# Patient Record
Sex: FEMALE | Race: OTHER | Employment: OTHER
[De-identification: names, ages, dates, MRNs, and addresses within clinical notes are randomized per-mention and may not be internally consistent; named-entity substitution may affect disease eponyms.]

---

## 2017-01-05 ENCOUNTER — TELEPHONE (OUTPATIENT)
Dept: INTERNAL MEDICINE | Facility: CLINIC | Age: 57
End: 2017-01-05

## 2017-01-06 ENCOUNTER — OFFICE VISIT (OUTPATIENT)
Dept: INTERNAL MEDICINE | Facility: CLINIC | Age: 57
End: 2017-01-06

## 2017-01-06 VITALS — HEART RATE: 76 BPM | SYSTOLIC BLOOD PRESSURE: 160 MMHG | RESPIRATION RATE: 18 BRPM | DIASTOLIC BLOOD PRESSURE: 101 MMHG

## 2017-01-06 DIAGNOSIS — I10 ESSENTIAL HYPERTENSION: Primary | ICD-10-CM

## 2017-01-06 DIAGNOSIS — G89.4 CHRONIC PAIN SYNDROME: ICD-10-CM

## 2017-01-06 DIAGNOSIS — E78.00 PURE HYPERCHOLESTEROLEMIA: ICD-10-CM

## 2017-01-06 DIAGNOSIS — M47.817 DJD (DEGENERATIVE JOINT DISEASE), LUMBOSACRAL: ICD-10-CM

## 2017-01-06 PROCEDURE — 99213 OFFICE O/P EST LOW 20 MIN: CPT | Performed by: INTERNAL MEDICINE

## 2017-01-06 RX ORDER — HYDROCHLOROTHIAZIDE 25 MG/1
25 TABLET ORAL DAILY
Qty: 30 TABLET | Refills: 6 | Status: SHIPPED | OUTPATIENT
Start: 2017-01-06 | End: 2017-01-27

## 2017-01-06 RX ORDER — ACETAMINOPHEN AND CODEINE PHOSPHATE 300; 30 MG/1; MG/1
TABLET ORAL
COMMUNITY
Start: 2016-12-23 | End: 2017-02-09

## 2017-01-06 ASSESSMENT — ENCOUNTER SYMPTOMS
BACK PAIN: 1
RESPIRATORY NEGATIVE: 1
GASTROINTESTINAL NEGATIVE: 1
EYES NEGATIVE: 1
ALLERGIC/IMMUNOLOGIC NEGATIVE: 1

## 2017-01-12 ENCOUNTER — TELEPHONE (OUTPATIENT)
Dept: INTERNAL MEDICINE | Facility: CLINIC | Age: 57
End: 2017-01-12

## 2017-01-12 PROBLEM — M16.0 PRIMARY OSTEOARTHRITIS OF BOTH HIPS: Chronic | Status: ACTIVE | Noted: 2017-01-12

## 2017-01-25 ENCOUNTER — CARE COORDINATION (OUTPATIENT)
Dept: CARE COORDINATION | Facility: CLINIC | Age: 57
End: 2017-01-25

## 2017-01-27 ENCOUNTER — TELEPHONE (OUTPATIENT)
Dept: INTERNAL MEDICINE | Facility: CLINIC | Age: 57
End: 2017-01-27

## 2017-01-27 RX ORDER — AMLODIPINE BESYLATE 5 MG/1
5 TABLET ORAL DAILY
Qty: 30 TABLET | Refills: 11 | Status: SHIPPED | OUTPATIENT
Start: 2017-01-27 | End: 2017-05-26 | Stop reason: SDUPTHER

## 2017-02-09 ENCOUNTER — HOSPITAL ENCOUNTER (OUTPATIENT)
Dept: PAIN MANAGEMENT | Age: 57
Discharge: HOME | End: 2017-02-09
Attending: PSYCHOLOGIST | Admitting: PSYCHOLOGIST
Payer: COMMERCIAL

## 2017-02-09 ENCOUNTER — HOSPITAL ENCOUNTER (OUTPATIENT)
Dept: PAIN MANAGEMENT | Age: 57
Discharge: HOME | End: 2017-02-09
Attending: ANESTHESIOLOGY | Admitting: ANESTHESIOLOGY
Payer: COMMERCIAL

## 2017-02-09 VITALS
HEART RATE: 80 BPM | SYSTOLIC BLOOD PRESSURE: 148 MMHG | TEMPERATURE: 98.3 F | DIASTOLIC BLOOD PRESSURE: 85 MMHG | RESPIRATION RATE: 16 BRPM

## 2017-02-09 DIAGNOSIS — M96.1 POSTLAMINECTOMY SYNDROME, LUMBAR REGION: Primary | Chronic | ICD-10-CM

## 2017-02-09 DIAGNOSIS — M16.0 PRIMARY OSTEOARTHRITIS OF BOTH HIPS: Chronic | ICD-10-CM

## 2017-02-09 PROCEDURE — 99214 OFFICE O/P EST MOD 30 MIN: CPT

## 2017-02-09 RX ORDER — OXYCODONE HYDROCHLORIDE 5 MG/1
5 TABLET ORAL EVERY 8 HOURS PRN
Qty: 90 TABLET | Refills: 0 | Status: SHIPPED | OUTPATIENT
Start: 2017-02-11 | End: 2017-03-08 | Stop reason: SDUPTHER

## 2017-02-09 ASSESSMENT — PAIN DESCRIPTION - PAIN TYPE: TYPE: CHRONIC PAIN

## 2017-02-09 ASSESSMENT — PAIN DESCRIPTION - ORIENTATION: ORIENTATION: LOWER

## 2017-02-09 ASSESSMENT — PAIN DESCRIPTION - FREQUENCY: FREQUENCY: CONTINUOUS

## 2017-02-09 ASSESSMENT — PAIN DESCRIPTION - ONSET: ONSET: ON-GOING

## 2017-02-09 ASSESSMENT — PAIN DESCRIPTION - LOCATION: LOCATION: BACK

## 2017-02-09 ASSESSMENT — PAIN SCALES - GENERAL: PAINLEVEL_OUTOF10: 6

## 2017-02-09 ASSESSMENT — PAIN DESCRIPTION - PROGRESSION: CLINICAL_PROGRESSION: NOT CHANGED

## 2017-02-15 ENCOUNTER — CARE COORDINATION (OUTPATIENT)
Dept: CARE COORDINATION | Facility: CLINIC | Age: 57
End: 2017-02-15

## 2017-02-22 ENCOUNTER — HOSPITAL ENCOUNTER (OUTPATIENT)
Age: 57
Setting detail: SPECIMEN
Discharge: HOME | End: 2017-02-22
Payer: COMMERCIAL

## 2017-02-22 ENCOUNTER — TELEPHONE (OUTPATIENT)
Dept: INTERNAL MEDICINE | Facility: CLINIC | Age: 57
End: 2017-02-22

## 2017-02-22 ENCOUNTER — OFFICE VISIT (OUTPATIENT)
Dept: INTERNAL MEDICINE | Facility: CLINIC | Age: 57
End: 2017-02-22

## 2017-02-22 VITALS
SYSTOLIC BLOOD PRESSURE: 130 MMHG | DIASTOLIC BLOOD PRESSURE: 94 MMHG | HEIGHT: 66 IN | HEART RATE: 88 BPM | WEIGHT: 188.8 LBS | BODY MASS INDEX: 30.34 KG/M2

## 2017-02-22 DIAGNOSIS — E78.00 PURE HYPERCHOLESTEROLEMIA: ICD-10-CM

## 2017-02-22 DIAGNOSIS — I82.491 ACUTE DEEP VEIN THROMBOSIS (DVT) OF OTHER SPECIFIED VEIN OF RIGHT LOWER EXTREMITY (HCC): ICD-10-CM

## 2017-02-22 DIAGNOSIS — M10.071 ACUTE IDIOPATHIC GOUT OF RIGHT ANKLE: ICD-10-CM

## 2017-02-22 DIAGNOSIS — I10 ESSENTIAL HYPERTENSION: ICD-10-CM

## 2017-02-22 DIAGNOSIS — M47.817 DJD (DEGENERATIVE JOINT DISEASE), LUMBOSACRAL: Primary | ICD-10-CM

## 2017-02-22 LAB
SEDIMENTATION RATE, ERYTHROCYTE: 30 MM (ref 0–20)
URIC ACID: 6.1 MG/DL (ref 2.4–5.7)

## 2017-02-22 PROCEDURE — 99213 OFFICE O/P EST LOW 20 MIN: CPT | Performed by: INTERNAL MEDICINE

## 2017-02-22 PROCEDURE — 36415 COLL VENOUS BLD VENIPUNCTURE: CPT

## 2017-02-22 PROCEDURE — 85651 RBC SED RATE NONAUTOMATED: CPT

## 2017-02-22 PROCEDURE — 84550 ASSAY OF BLOOD/URIC ACID: CPT

## 2017-02-22 ASSESSMENT — ENCOUNTER SYMPTOMS
RESPIRATORY NEGATIVE: 1
ALLERGIC/IMMUNOLOGIC NEGATIVE: 1
GASTROINTESTINAL NEGATIVE: 1
EYES NEGATIVE: 1

## 2017-02-23 ENCOUNTER — HOSPITAL ENCOUNTER (OUTPATIENT)
Dept: VASCULAR LAB | Age: 57
Discharge: HOME | End: 2017-02-23
Payer: COMMERCIAL

## 2017-02-23 ENCOUNTER — TELEPHONE (OUTPATIENT)
Dept: INTERNAL MEDICINE CLINIC | Age: 57
End: 2017-02-23

## 2017-02-23 DIAGNOSIS — I82.491 ACUTE DEEP VEIN THROMBOSIS (DVT) OF OTHER SPECIFIED VEIN OF RIGHT LOWER EXTREMITY (HCC): ICD-10-CM

## 2017-02-23 PROCEDURE — 93970 EXTREMITY STUDY: CPT

## 2017-02-24 ENCOUNTER — OFFICE VISIT (OUTPATIENT)
Dept: INTERNAL MEDICINE | Facility: CLINIC | Age: 57
End: 2017-02-24

## 2017-02-24 VITALS — HEART RATE: 43 BPM | DIASTOLIC BLOOD PRESSURE: 69 MMHG | HEIGHT: 66 IN | SYSTOLIC BLOOD PRESSURE: 110 MMHG

## 2017-02-24 DIAGNOSIS — M10.171 LEAD-INDUCED ACUTE GOUT OF RIGHT ANKLE, SUBSEQUENT ENCOUNTER: Primary | ICD-10-CM

## 2017-02-24 DIAGNOSIS — T56.0X1D LEAD-INDUCED ACUTE GOUT OF RIGHT ANKLE, SUBSEQUENT ENCOUNTER: Primary | ICD-10-CM

## 2017-02-24 DIAGNOSIS — M17.0 PRIMARY OSTEOARTHRITIS OF BOTH KNEES: ICD-10-CM

## 2017-02-24 DIAGNOSIS — M19.011 PRIMARY OSTEOARTHRITIS OF BOTH SHOULDERS: ICD-10-CM

## 2017-02-24 DIAGNOSIS — I10 ESSENTIAL HYPERTENSION: ICD-10-CM

## 2017-02-24 DIAGNOSIS — E78.00 PURE HYPERCHOLESTEROLEMIA: ICD-10-CM

## 2017-02-24 DIAGNOSIS — M19.012 PRIMARY OSTEOARTHRITIS OF BOTH SHOULDERS: ICD-10-CM

## 2017-02-24 PROCEDURE — 99213 OFFICE O/P EST LOW 20 MIN: CPT | Performed by: INTERNAL MEDICINE

## 2017-02-24 RX ORDER — COLCHICINE 0.6 MG/1
0.6 TABLET ORAL DAILY
Qty: 10 TABLET | Refills: 0 | Status: SHIPPED | OUTPATIENT
Start: 2017-02-24 | End: 2017-04-05 | Stop reason: SDUPTHER

## 2017-02-24 ASSESSMENT — ENCOUNTER SYMPTOMS
EYES NEGATIVE: 1
RESPIRATORY NEGATIVE: 1
GASTROINTESTINAL NEGATIVE: 1

## 2017-03-08 ENCOUNTER — TELEPHONE (OUTPATIENT)
Dept: INTERNAL MEDICINE | Facility: CLINIC | Age: 57
End: 2017-03-08

## 2017-03-08 ENCOUNTER — HOSPITAL ENCOUNTER (OUTPATIENT)
Dept: PAIN MANAGEMENT | Age: 57
Discharge: HOME | End: 2017-03-08
Payer: COMMERCIAL

## 2017-03-08 VITALS
DIASTOLIC BLOOD PRESSURE: 83 MMHG | SYSTOLIC BLOOD PRESSURE: 141 MMHG | HEART RATE: 49 BPM | TEMPERATURE: 98 F | RESPIRATION RATE: 16 BRPM

## 2017-03-08 DIAGNOSIS — R00.1 BRADYCARDIA: Primary | ICD-10-CM

## 2017-03-08 DIAGNOSIS — M16.0 PRIMARY OSTEOARTHRITIS OF BOTH HIPS: Primary | Chronic | ICD-10-CM

## 2017-03-08 PROCEDURE — 99213 OFFICE O/P EST LOW 20 MIN: CPT

## 2017-03-08 PROCEDURE — 99214 OFFICE O/P EST MOD 30 MIN: CPT

## 2017-03-08 RX ORDER — OXYCODONE HYDROCHLORIDE 5 MG/1
5 TABLET ORAL EVERY 8 HOURS PRN
Qty: 90 TABLET | Refills: 0 | Status: SHIPPED | OUTPATIENT
Start: 2017-03-13 | End: 2017-04-10 | Stop reason: SDUPTHER

## 2017-03-08 RX ORDER — ACETAMINOPHEN 325 MG/1
650 TABLET ORAL PRN
COMMUNITY
End: 2018-04-03 | Stop reason: SDUPTHER

## 2017-03-08 ASSESSMENT — PAIN DESCRIPTION - PROGRESSION: CLINICAL_PROGRESSION: NOT CHANGED

## 2017-03-08 ASSESSMENT — PAIN DESCRIPTION - ORIENTATION: ORIENTATION: RIGHT;LEFT;LOWER

## 2017-03-08 ASSESSMENT — PAIN DESCRIPTION - FREQUENCY: FREQUENCY: CONTINUOUS

## 2017-03-08 ASSESSMENT — PAIN DESCRIPTION - ONSET: ONSET: ON-GOING

## 2017-03-08 ASSESSMENT — PAIN DESCRIPTION - LOCATION: LOCATION: BACK

## 2017-03-08 ASSESSMENT — PAIN DESCRIPTION - PAIN TYPE: TYPE: CHRONIC PAIN

## 2017-04-05 RX ORDER — COLCHICINE 0.6 MG/1
0.6 TABLET ORAL DAILY
Qty: 10 TABLET | Refills: 2 | Status: SHIPPED | OUTPATIENT
Start: 2017-04-05 | End: 2017-05-07 | Stop reason: SDUPTHER

## 2017-04-10 ENCOUNTER — HOSPITAL ENCOUNTER (OUTPATIENT)
Dept: PAIN MANAGEMENT | Age: 57
Discharge: HOME | End: 2017-04-10
Payer: COMMERCIAL

## 2017-04-10 VITALS — HEIGHT: 66 IN | RESPIRATION RATE: 18 BRPM | WEIGHT: 184 LBS | TEMPERATURE: 97.8 F | BODY MASS INDEX: 29.57 KG/M2

## 2017-04-10 DIAGNOSIS — G89.4 CHRONIC PAIN SYNDROME: Primary | ICD-10-CM

## 2017-04-10 DIAGNOSIS — M96.1 POSTLAMINECTOMY SYNDROME, LUMBAR REGION: Chronic | ICD-10-CM

## 2017-04-10 DIAGNOSIS — M16.0 PRIMARY OSTEOARTHRITIS OF BOTH HIPS: Chronic | ICD-10-CM

## 2017-04-10 PROCEDURE — 99214 OFFICE O/P EST MOD 30 MIN: CPT

## 2017-04-10 RX ORDER — OXYCODONE HYDROCHLORIDE 5 MG/1
5 TABLET ORAL EVERY 8 HOURS PRN
Qty: 90 TABLET | Refills: 0 | Status: SHIPPED | OUTPATIENT
Start: 2017-04-12 | End: 2017-05-09 | Stop reason: SDUPTHER

## 2017-04-10 ASSESSMENT — PAIN DESCRIPTION - ORIENTATION: ORIENTATION: RIGHT;LEFT;LOWER

## 2017-04-10 ASSESSMENT — ENCOUNTER SYMPTOMS
COUGH: 0
SHORTNESS OF BREATH: 0
CONSTIPATION: 0

## 2017-04-10 ASSESSMENT — PAIN DESCRIPTION - FREQUENCY: FREQUENCY: CONTINUOUS

## 2017-04-10 ASSESSMENT — PAIN DESCRIPTION - ONSET: ONSET: ON-GOING

## 2017-04-10 ASSESSMENT — PAIN SCALES - GENERAL: PAINLEVEL_OUTOF10: 7

## 2017-04-10 ASSESSMENT — PAIN DESCRIPTION - PAIN TYPE: TYPE: CHRONIC PAIN

## 2017-04-10 ASSESSMENT — PAIN DESCRIPTION - LOCATION: LOCATION: BACK

## 2017-04-24 RX ORDER — OMEPRAZOLE 20 MG/1
CAPSULE, DELAYED RELEASE ORAL
Qty: 30 CAPSULE | Refills: 0 | Status: SHIPPED | OUTPATIENT
Start: 2017-04-24 | End: 2017-07-27 | Stop reason: SDUPTHER

## 2017-05-08 RX ORDER — COLCHICINE 0.6 MG/1
TABLET ORAL
Qty: 10 TABLET | Refills: 2 | Status: SHIPPED | OUTPATIENT
Start: 2017-05-08 | End: 2017-08-14 | Stop reason: SDUPTHER

## 2017-05-09 ENCOUNTER — HOSPITAL ENCOUNTER (OUTPATIENT)
Dept: PAIN MANAGEMENT | Age: 57
Discharge: HOME | End: 2017-05-09
Payer: COMMERCIAL

## 2017-05-09 VITALS
SYSTOLIC BLOOD PRESSURE: 158 MMHG | TEMPERATURE: 98.3 F | RESPIRATION RATE: 12 BRPM | DIASTOLIC BLOOD PRESSURE: 78 MMHG | HEART RATE: 98 BPM

## 2017-05-09 PROCEDURE — 99214 OFFICE O/P EST MOD 30 MIN: CPT

## 2017-05-09 PROCEDURE — G0463 HOSPITAL OUTPT CLINIC VISIT: HCPCS

## 2017-05-09 PROCEDURE — 99213 OFFICE O/P EST LOW 20 MIN: CPT

## 2017-05-09 RX ORDER — OXYCODONE HYDROCHLORIDE 5 MG/1
5 TABLET ORAL EVERY 8 HOURS PRN
Qty: 90 TABLET | Refills: 0 | Status: SHIPPED | OUTPATIENT
Start: 2017-05-12 | End: 2017-06-09 | Stop reason: SDUPTHER

## 2017-05-09 ASSESSMENT — PAIN DESCRIPTION - LOCATION: LOCATION: HIP

## 2017-05-09 ASSESSMENT — PAIN DESCRIPTION - DESCRIPTORS: DESCRIPTORS: CONSTANT;ACHING

## 2017-05-09 ASSESSMENT — PAIN DESCRIPTION - PROGRESSION: CLINICAL_PROGRESSION: GRADUALLY WORSENING

## 2017-05-09 ASSESSMENT — PAIN SCALES - GENERAL: PAINLEVEL_OUTOF10: 6

## 2017-05-09 ASSESSMENT — PAIN DESCRIPTION - PAIN TYPE: TYPE: CHRONIC PAIN

## 2017-05-09 ASSESSMENT — PAIN DESCRIPTION - ORIENTATION: ORIENTATION: RIGHT

## 2017-05-26 ENCOUNTER — OFFICE VISIT (OUTPATIENT)
Dept: INTERNAL MEDICINE | Age: 57
End: 2017-05-26
Payer: COMMERCIAL

## 2017-05-26 VITALS
SYSTOLIC BLOOD PRESSURE: 143 MMHG | HEART RATE: 90 BPM | RESPIRATION RATE: 18 BRPM | WEIGHT: 186 LBS | BODY MASS INDEX: 30.02 KG/M2 | DIASTOLIC BLOOD PRESSURE: 93 MMHG

## 2017-05-26 DIAGNOSIS — R73.03 PREDIABETES: ICD-10-CM

## 2017-05-26 DIAGNOSIS — Z13.9 SCREENING: ICD-10-CM

## 2017-05-26 DIAGNOSIS — M47.817 DJD (DEGENERATIVE JOINT DISEASE), LUMBOSACRAL: Primary | ICD-10-CM

## 2017-05-26 DIAGNOSIS — E78.00 PURE HYPERCHOLESTEROLEMIA: ICD-10-CM

## 2017-05-26 DIAGNOSIS — I10 ESSENTIAL HYPERTENSION: ICD-10-CM

## 2017-05-26 LAB — HBA1C MFR BLD: 6.2 %

## 2017-05-26 PROCEDURE — 83036 HEMOGLOBIN GLYCOSYLATED A1C: CPT | Performed by: INTERNAL MEDICINE

## 2017-05-26 PROCEDURE — 99213 OFFICE O/P EST LOW 20 MIN: CPT | Performed by: INTERNAL MEDICINE

## 2017-05-26 RX ORDER — AMLODIPINE BESYLATE 10 MG/1
10 TABLET ORAL DAILY
Qty: 30 TABLET | Refills: 6 | Status: SHIPPED | OUTPATIENT
Start: 2017-05-26 | End: 2017-12-22 | Stop reason: SDUPTHER

## 2017-05-26 ASSESSMENT — ENCOUNTER SYMPTOMS
ALLERGIC/IMMUNOLOGIC NEGATIVE: 1
EYES NEGATIVE: 1
GASTROINTESTINAL NEGATIVE: 1
RESPIRATORY NEGATIVE: 1

## 2017-05-26 ASSESSMENT — PATIENT HEALTH QUESTIONNAIRE - PHQ9
2. FEELING DOWN, DEPRESSED OR HOPELESS: 0
1. LITTLE INTEREST OR PLEASURE IN DOING THINGS: 0
SUM OF ALL RESPONSES TO PHQ9 QUESTIONS 1 & 2: 0
SUM OF ALL RESPONSES TO PHQ QUESTIONS 1-9: 0

## 2017-06-09 ENCOUNTER — HOSPITAL ENCOUNTER (OUTPATIENT)
Dept: PAIN MANAGEMENT | Age: 57
Discharge: HOME | End: 2017-06-09
Payer: COMMERCIAL

## 2017-06-09 VITALS
WEIGHT: 186 LBS | BODY MASS INDEX: 29.89 KG/M2 | RESPIRATION RATE: 20 BRPM | SYSTOLIC BLOOD PRESSURE: 128 MMHG | DIASTOLIC BLOOD PRESSURE: 78 MMHG | HEIGHT: 66 IN | HEART RATE: 74 BPM | TEMPERATURE: 74 F

## 2017-06-09 DIAGNOSIS — M16.0 PRIMARY OSTEOARTHRITIS OF BOTH HIPS: Chronic | ICD-10-CM

## 2017-06-09 DIAGNOSIS — G89.4 CHRONIC PAIN SYNDROME: Primary | ICD-10-CM

## 2017-06-09 DIAGNOSIS — S73.014D: ICD-10-CM

## 2017-06-09 PROCEDURE — 80307 DRUG TEST PRSMV CHEM ANLYZR: CPT

## 2017-06-09 PROCEDURE — 99214 OFFICE O/P EST MOD 30 MIN: CPT

## 2017-06-09 PROCEDURE — G0463 HOSPITAL OUTPT CLINIC VISIT: HCPCS

## 2017-06-09 RX ORDER — OXYCODONE HYDROCHLORIDE 5 MG/1
5 TABLET ORAL EVERY 8 HOURS PRN
Qty: 90 TABLET | Refills: 0 | Status: SHIPPED | OUTPATIENT
Start: 2017-06-11 | End: 2017-07-10 | Stop reason: SDUPTHER

## 2017-06-09 ASSESSMENT — PAIN SCALES - GENERAL: PAINLEVEL_OUTOF10: 6

## 2017-06-09 ASSESSMENT — PAIN DESCRIPTION - FREQUENCY: FREQUENCY: CONTINUOUS

## 2017-06-09 ASSESSMENT — PAIN DESCRIPTION - DESCRIPTORS: DESCRIPTORS: CONSTANT;ACHING

## 2017-06-09 ASSESSMENT — ENCOUNTER SYMPTOMS
COUGH: 0
SHORTNESS OF BREATH: 0
CONSTIPATION: 0
BACK PAIN: 1

## 2017-06-09 ASSESSMENT — PAIN DESCRIPTION - LOCATION: LOCATION: HIP

## 2017-06-09 ASSESSMENT — PAIN DESCRIPTION - PAIN TYPE: TYPE: CHRONIC PAIN

## 2017-06-09 ASSESSMENT — PAIN DESCRIPTION - ORIENTATION: ORIENTATION: RIGHT

## 2017-06-09 ASSESSMENT — PAIN DESCRIPTION - ONSET: ONSET: ON-GOING

## 2017-06-11 LAB
6-ACETYLMORPHINE, UR: NOT DETECTED
7-AMINOCLONAZEPAM, URINE: NOT DETECTED
ALPHA-OH-ALPRAZ, URINE: NOT DETECTED
ALPRAZOLAM, URINE: NOT DETECTED
AMPHETAMINES, URINE: NOT DETECTED
BARBITURATES, URINE: NOT DETECTED
BENZOYLECGONINE, UR: NOT DETECTED
BUPRENORPHINE URINE: NOT DETECTED
CARISOPRODOL, UR: NOT DETECTED
CLONAZEPAM, URINE: NOT DETECTED
CODEINE, URINE: NOT DETECTED
CREATININE URINE: 145.9 MG/DL (ref 20–400)
DIAZEPAM, URINE: NOT DETECTED
EER PAIN MGT DRUG PANEL, HIGH RES/EMIT U: NORMAL
ETHYL GLUCURONIDE UR: NOT DETECTED
FENTANYL URINE: NOT DETECTED
HYDROCODONE, URINE: NOT DETECTED
HYDROMORPHONE, URINE: NOT DETECTED
LORAZEPAM, URINE: NOT DETECTED
MARIJUANA METAB, UR: NOT DETECTED
MDA, UR: NOT DETECTED
MDEA, EVE, UR: NOT DETECTED
MDMA URINE: NOT DETECTED
MEPERIDINE METAB, UR: NOT DETECTED
METHADONE, URINE: NOT DETECTED
METHAMPHETAMINE, URINE: NOT DETECTED
METHYLPHENIDATE: NOT DETECTED
MIDAZOLAM, URINE: NOT DETECTED
MORPHINE URINE: NOT DETECTED
NORBUPRENORPHINE, URINE: NOT DETECTED
NORDIAZEPAM, URINE: NOT DETECTED
NORFENTANYL, URINE: NOT DETECTED
NORHYDROCODONE, URINE: NOT DETECTED
NOROXYCODONE, URINE: PRESENT
NOROXYMORPHONE, URINE: PRESENT
OXAZEPAM, URINE: NOT DETECTED
OXYCODONE URINE: PRESENT
OXYMORPHONE, URINE: PRESENT
PAIN MGT DRUG PANEL, HI RES, UR: NORMAL
PCP,URINE: NOT DETECTED
PHENTERMINE, UR: NOT DETECTED
PROPOXYPHENE, URINE: NOT DETECTED
TAPENTADOL, URINE: NOT DETECTED
TAPENTADOL-O-SULFATE, URINE: NOT DETECTED
TEMAZEPAM, URINE: NOT DETECTED
TRAMADOL, URINE: NOT DETECTED
ZOLPIDEM, URINE: NOT DETECTED

## 2017-07-10 ENCOUNTER — HOSPITAL ENCOUNTER (OUTPATIENT)
Dept: PAIN MANAGEMENT | Age: 57
Discharge: HOME | End: 2017-07-10
Payer: COMMERCIAL

## 2017-07-10 VITALS
SYSTOLIC BLOOD PRESSURE: 121 MMHG | HEART RATE: 90 BPM | TEMPERATURE: 98.4 F | RESPIRATION RATE: 20 BRPM | DIASTOLIC BLOOD PRESSURE: 90 MMHG

## 2017-07-10 DIAGNOSIS — M16.0 PRIMARY OSTEOARTHRITIS OF BOTH HIPS: Primary | Chronic | ICD-10-CM

## 2017-07-10 PROCEDURE — 99214 OFFICE O/P EST MOD 30 MIN: CPT

## 2017-07-10 PROCEDURE — G0463 HOSPITAL OUTPT CLINIC VISIT: HCPCS

## 2017-07-10 RX ORDER — OXYCODONE HYDROCHLORIDE 5 MG/1
5 TABLET ORAL EVERY 8 HOURS PRN
Qty: 90 TABLET | Refills: 0 | Status: SHIPPED | OUTPATIENT
Start: 2017-07-11 | End: 2017-08-09 | Stop reason: SDUPTHER

## 2017-07-10 ASSESSMENT — PAIN DESCRIPTION - PAIN TYPE: TYPE: CHRONIC PAIN

## 2017-07-10 ASSESSMENT — PAIN DESCRIPTION - ORIENTATION: ORIENTATION: RIGHT;LEFT

## 2017-07-10 ASSESSMENT — ENCOUNTER SYMPTOMS
SHORTNESS OF BREATH: 0
CONSTIPATION: 0
COUGH: 0

## 2017-07-10 ASSESSMENT — PAIN DESCRIPTION - PROGRESSION: CLINICAL_PROGRESSION: GRADUALLY WORSENING

## 2017-07-10 ASSESSMENT — PAIN DESCRIPTION - LOCATION: LOCATION: HIP;GROIN

## 2017-07-10 ASSESSMENT — PAIN DESCRIPTION - ONSET: ONSET: ON-GOING

## 2017-07-10 ASSESSMENT — PAIN SCALES - GENERAL: PAINLEVEL_OUTOF10: 5

## 2017-07-10 ASSESSMENT — PAIN DESCRIPTION - DESCRIPTORS: DESCRIPTORS: CONSTANT;ACHING

## 2017-07-10 ASSESSMENT — PAIN DESCRIPTION - FREQUENCY: FREQUENCY: CONTINUOUS

## 2017-07-27 RX ORDER — OMEPRAZOLE 20 MG/1
CAPSULE, DELAYED RELEASE ORAL
Qty: 30 CAPSULE | Refills: 0 | Status: SHIPPED | OUTPATIENT
Start: 2017-07-27 | End: 2017-09-20 | Stop reason: SDUPTHER

## 2017-08-09 ENCOUNTER — HOSPITAL ENCOUNTER (OUTPATIENT)
Dept: PAIN MANAGEMENT | Age: 57
Discharge: HOME | End: 2017-08-09
Payer: COMMERCIAL

## 2017-08-09 VITALS
RESPIRATION RATE: 12 BRPM | DIASTOLIC BLOOD PRESSURE: 84 MMHG | HEART RATE: 81 BPM | TEMPERATURE: 98 F | SYSTOLIC BLOOD PRESSURE: 125 MMHG

## 2017-08-09 DIAGNOSIS — M16.0 PRIMARY OSTEOARTHRITIS OF BOTH HIPS: Primary | Chronic | ICD-10-CM

## 2017-08-09 DIAGNOSIS — M96.1 POSTLAMINECTOMY SYNDROME, LUMBAR REGION: Chronic | ICD-10-CM

## 2017-08-09 PROCEDURE — G0463 HOSPITAL OUTPT CLINIC VISIT: HCPCS

## 2017-08-09 PROCEDURE — 99214 OFFICE O/P EST MOD 30 MIN: CPT

## 2017-08-09 PROCEDURE — 99213 OFFICE O/P EST LOW 20 MIN: CPT

## 2017-08-09 RX ORDER — OXYCODONE HYDROCHLORIDE 5 MG/1
5 TABLET ORAL EVERY 8 HOURS PRN
Qty: 90 TABLET | Refills: 0 | Status: SHIPPED | OUTPATIENT
Start: 2017-08-10 | End: 2017-09-05 | Stop reason: SDUPTHER

## 2017-08-09 ASSESSMENT — ENCOUNTER SYMPTOMS
CONSTIPATION: 0
COUGH: 0
SHORTNESS OF BREATH: 0
BOWEL INCONTINENCE: 0
BACK PAIN: 1

## 2017-08-09 ASSESSMENT — PAIN DESCRIPTION - LOCATION: LOCATION: HIP

## 2017-08-09 ASSESSMENT — PAIN SCALES - GENERAL: PAINLEVEL_OUTOF10: 6

## 2017-08-09 ASSESSMENT — PAIN DESCRIPTION - FREQUENCY: FREQUENCY: CONTINUOUS

## 2017-08-09 ASSESSMENT — PAIN DESCRIPTION - PAIN TYPE: TYPE: CHRONIC PAIN

## 2017-08-09 ASSESSMENT — PAIN DESCRIPTION - PROGRESSION: CLINICAL_PROGRESSION: GRADUALLY WORSENING

## 2017-08-09 ASSESSMENT — PAIN DESCRIPTION - ORIENTATION: ORIENTATION: RIGHT;LEFT

## 2017-08-09 ASSESSMENT — PAIN DESCRIPTION - DESCRIPTORS: DESCRIPTORS: CONSTANT;STABBING;ACHING

## 2017-08-15 RX ORDER — COLCHICINE 0.6 MG/1
TABLET ORAL
Qty: 10 TABLET | Refills: 2 | Status: SHIPPED | OUTPATIENT
Start: 2017-08-15 | End: 2017-09-14 | Stop reason: SDUPTHER

## 2017-08-18 ENCOUNTER — HOSPITAL ENCOUNTER (OUTPATIENT)
Age: 57
Setting detail: SPECIMEN
Discharge: HOME | End: 2017-08-18
Payer: COMMERCIAL

## 2017-08-18 LAB
CREATININE URINE: 149.4 MG/DL (ref 28–217)
MICROALBUMIN/CREAT 24H UR: 31 MG/L
MICROALBUMIN/CREAT UR-RTO: 21 MCG/MG CREAT

## 2017-08-18 PROCEDURE — 82043 UR ALBUMIN QUANTITATIVE: CPT

## 2017-08-18 PROCEDURE — 82570 ASSAY OF URINE CREATININE: CPT

## 2017-08-29 DIAGNOSIS — M16.0 PRIMARY OSTEOARTHRITIS OF BOTH HIPS: Primary | Chronic | ICD-10-CM

## 2017-08-30 ENCOUNTER — OFFICE VISIT (OUTPATIENT)
Dept: ORTHOPEDIC SURGERY | Age: 57
End: 2017-08-30
Payer: COMMERCIAL

## 2017-08-30 VITALS — HEIGHT: 66 IN | BODY MASS INDEX: 29.9 KG/M2 | WEIGHT: 186.07 LBS

## 2017-08-30 DIAGNOSIS — M25.351 HIP INSTABILITY, RIGHT: ICD-10-CM

## 2017-08-30 DIAGNOSIS — Z96.641 HISTORY OF TOTAL RIGHT HIP ARTHROPLASTY: ICD-10-CM

## 2017-08-30 DIAGNOSIS — M25.551 RIGHT HIP PAIN: Primary | ICD-10-CM

## 2017-08-30 PROBLEM — M25.359 HIP INSTABILITY: Status: ACTIVE | Noted: 2017-08-30

## 2017-08-30 PROCEDURE — 3017F COLORECTAL CA SCREEN DOC REV: CPT | Performed by: STUDENT IN AN ORGANIZED HEALTH CARE EDUCATION/TRAINING PROGRAM

## 2017-08-30 PROCEDURE — G8417 CALC BMI ABV UP PARAM F/U: HCPCS | Performed by: STUDENT IN AN ORGANIZED HEALTH CARE EDUCATION/TRAINING PROGRAM

## 2017-08-30 PROCEDURE — 1036F TOBACCO NON-USER: CPT | Performed by: STUDENT IN AN ORGANIZED HEALTH CARE EDUCATION/TRAINING PROGRAM

## 2017-08-30 PROCEDURE — 99203 OFFICE O/P NEW LOW 30 MIN: CPT | Performed by: STUDENT IN AN ORGANIZED HEALTH CARE EDUCATION/TRAINING PROGRAM

## 2017-08-30 PROCEDURE — G8427 DOCREV CUR MEDS BY ELIG CLIN: HCPCS | Performed by: STUDENT IN AN ORGANIZED HEALTH CARE EDUCATION/TRAINING PROGRAM

## 2017-08-30 PROCEDURE — 3014F SCREEN MAMMO DOC REV: CPT | Performed by: STUDENT IN AN ORGANIZED HEALTH CARE EDUCATION/TRAINING PROGRAM

## 2017-08-30 ASSESSMENT — ENCOUNTER SYMPTOMS
ABDOMINAL PAIN: 0
COUGH: 0
NAUSEA: 0
COLOR CHANGE: 0
EYE PAIN: 0
VOMITING: 0
WHEEZING: 0
ABDOMINAL DISTENTION: 0
SHORTNESS OF BREATH: 0

## 2017-08-30 NOTE — PROGRESS NOTES
02/18/14    caudal# 3, celestone 9 mg    OTHER SURGICAL HISTORY      SPINAL ENDOSCOPY X3    SHOULDER ARTHROSCOPY Left 06/13/15    SHOULDER SURGERY  Right; 2011    SPINE SURGERY  2009    TOTAL HIP ARTHROPLASTY Right 10/12/2016       Current Medications:   Current Outpatient Prescriptions   Medication Sig Dispense Refill    colchicine (COLCRYS) 0.6 MG tablet take 1 tablet by mouth once daily 10 tablet 2    oxyCODONE (ROXICODONE) 5 MG immediate release tablet Take 1 tablet by mouth every 8 hours as needed for Pain . Earliest Fill Date: 8/10/17 90 tablet 0    omeprazole (PRILOSEC) 20 MG delayed release capsule take 1 capsule by mouth once daily 30-60 MINUTES BEFORE FIRST MEAL OF DAY 30 capsule 0    amLODIPine (NORVASC) 10 MG tablet Take 1 tablet by mouth daily 30 tablet 6    acetaminophen (TYLENOL) 325 MG tablet Take 650 mg by mouth as needed for Pain       No current facility-administered medications for this visit. Allergies:    Asa [aspirin]; Ibuprofen; Neurontin [gabapentin]; Shellfish-derived products;  Shrimp flavor; and Tape Beverely Holman tape]    Social History:   Social History     Social History    Marital status: Single     Spouse name: N/A    Number of children: N/A    Years of education: N/A     Occupational History    disability N/A     Social History Main Topics    Smoking status: Former Smoker     Packs/day: 0.25     Years: 25.00     Types: Cigarettes, E-Cigarettes    Smokeless tobacco: Never Used      Comment: e-cigarette currently with occasional hit off a cigarette    Alcohol use 0.0 oz/week     0 Standard drinks or equivalent per week      Comment: occasionally    Drug use: No      Comment: crack YRS AGO    Sexual activity: Yes     Other Topics Concern    Not on file     Social History Narrative       Family History:  Family History   Problem Relation Age of Onset    COPD Mother     Coronary Art Dis Father     Cancer Sister     Coronary Art Dis Sister     Cirrhosis Sister  Arthritis Brother     Asthma Brother     Arthritis Sister     Asthma Sister     Cancer Sister     Asthma Sister     Cancer Sister     Asthma Sister     Asthma Brother     Arthritis Brother          REVIEW OF SYSTEMS:  Review of Systems   Constitutional: Negative for chills and fever. HENT: Negative for congestion. Eyes: Negative for pain and visual disturbance. Respiratory: Negative for cough, shortness of breath and wheezing. Cardiovascular: Negative for chest pain, palpitations and leg swelling. Gastrointestinal: Negative for abdominal distention, abdominal pain, nausea and vomiting. Musculoskeletal: Positive for arthralgias, gait problem and myalgias. Negative for joint swelling. Skin: Negative for color change, rash and wound. Neurological: Positive for weakness. Psychiatric/Behavioral: Negative for suicidal ideas. The patient is not nervous/anxious. I have reviewed the CC, HPI, ROS, PMH, FHX, Social History. I agree with the documentation provided by other staff, residents, and/or medical students and have reviewed their documentation prior to providing my signature indicating agreement. PHYSICAL EXAM:  Ht 5' 6.14\" (1.68 m)  Wt 186 lb 1.1 oz (84.4 kg)  BMI 29.9 kg/m2  Physical Exam  Gen: alert and oriented  Psych:  Appropriate affect; Appropriate knowledge base; Appropriate mood; No hallucinations; Head: normocephalic atraumatic   Chest: symmetric chest excursion  Pelvis: stable  Ortho Exam  RLE: Abduction brace in place. Incision well healed without erythema or drainage or dehiscence. Very TTP over incision. No instability on exam. Patient does well with flex/ext but is very hesitant when I internally and externally rotate the leg. Denies any pain during ROM but states she \"feels it in there. \" EHL/FHL/GS/TA gross motor intact. Saph/sural/SP/DP/plantar SILT. Compartments soft and compressible. Foot is warm and perfused. DP/PT 2+ w/ BCR.       Radiology:   History: Required     Requested Specialty:   Physical Therapy     Number of Visits Requested:   1        Juanpablo Jones, DO  PGY-2, Department of [address], [place], [place]  5:27 PM 8/30/2017

## 2017-08-30 NOTE — MR AVS SNAPSHOT
After Visit Summary             Anthony Willson   2017 2:50 PM   Office Visit    Description:  Female : 1960   Provider:  Natalie López DO   Department:  [address] [place] [address] Drive and Future Appointments         Below is a list of your follow-up and future appointments. This may not be a complete list as you may have made appointments directly with providers that we are not aware of or your providers may have made some for you. Please call your providers to confirm appointments. It is important to keep your appointments. Please bring your current insurance card, photo ID, co-pay, and all medication bottles to your appointment. If self-pay, payment is expected at the time of service. Your To-Do List     Future Appointments Provider Department Dept Phone    2017 3:30 PM BUBBA Alarcon/ [address] [phone]    Please bring your photo ID, insurance card, co-pay and medication bottles with you to your appointment Appointments must be kept or cancelled within 24 hours    10/9/2017 11:20 AM SCHEDULE, [address] [phone]    Please arrive 15 minutes prior to appointment time, bring insurance card and photo ID.      Future Orders Complete By Expires    C-Reactive Protein [PGR908 Custom]  2017    Comprehensive Metabolic Panel [EST42 Custom]  2017    Sedimentation Rate [FMS1626 Custom]  2017         Information from Your Visit        Department     Name Address Phone Fax    [facility]/ [address] [place] [address] [phone]      You Were Seen for:         Comments    Right hip pain   [[id number]]         Vital Signs     Height Weight Body Mass Index Smoking Status          5' 6.14\" (1.68 m) 186 lb 1.1 oz (84.4 kg) 29.9 kg/m2 Former Smoker        Additional Information about your Body Mass Index (BMI) Date Of Birth Sex Race Ethnicity Preferred Language    1960 Female Black Non-/Non  English      Problem List as of 8/30/2017  Date Reviewed: 8/9/2017                Primary osteoarthritis of both hips (Chronic)    Acute cystitis without hematuria    Frequent PVCs    Posterior dislocation of hip ([address]    History of total right hip replacement    S/P lumbar spinal fusion    Encounter for medication monitoring    Chronic pain syndrome    Shoulder bursitis    Postlaminectomy syndrome, lumbar region (Chronic)    Osteoarthritis of both knees    Osteoarthritis of both shoulders    DJD (degenerative joint disease), lumbosacral    Essential hypertension    Hyperlipidemia    Depression    Obesity      Preventive Care        Date Due    Diabetic Foot Exam 6/27/1970    Eye Exam By An Eye Doctor 6/27/1970    Pneumococcal Vaccine - Pneumovax for adults aged 19-64 years with: chronic heart disease, chronic lung disease, diabetes mellitus, alcoholism, chronic liver disease, or cigarette smoking. (1 of 1 - PPSV23) 6/27/1979    Pap Smear 6/27/1981    Cholesterol Screening 7/12/2017    Yearly Flu Vaccine (1) 8/1/2017    Tetanus Combination Vaccine (1 - Tdap) 11/4/2017 (Originally 6/27/1979)    Hepatitis C screening is recommended for all adults regardless of risk factors born between [facility] at least once (lifetime) who have never been tested. 11/4/2017 (Originally 1960)    HIV screening is recommended for all people regardless of risk factors  aged 15-65 years at least once (lifetime) who have never been HIV tested. 11/4/2017 (Originally 6/27/1975)    Mammograms are recommended every 2 years for low/average risk patients aged 48 - 69, and every year for high risk patients per updated national guidelines. However these guidelines can be individualized by your provider.  8/4/2018 (Originally 6/27/2010)    Hemoglobin A1C (Test For Long-Term Glucose Control) 5/26/2018

## 2017-09-05 ENCOUNTER — HOSPITAL ENCOUNTER (OUTPATIENT)
Dept: PAIN MANAGEMENT | Age: 57
Discharge: HOME | End: 2017-09-05
Payer: COMMERCIAL

## 2017-09-05 VITALS
SYSTOLIC BLOOD PRESSURE: 142 MMHG | BODY MASS INDEX: 29.89 KG/M2 | WEIGHT: 186 LBS | HEIGHT: 66 IN | HEART RATE: 78 BPM | RESPIRATION RATE: 16 BRPM | DIASTOLIC BLOOD PRESSURE: 90 MMHG | TEMPERATURE: 98.1 F

## 2017-09-05 DIAGNOSIS — M47.817 DJD (DEGENERATIVE JOINT DISEASE), LUMBOSACRAL: ICD-10-CM

## 2017-09-05 DIAGNOSIS — M96.1 POSTLAMINECTOMY SYNDROME, LUMBAR REGION: Primary | Chronic | ICD-10-CM

## 2017-09-05 PROCEDURE — 99214 OFFICE O/P EST MOD 30 MIN: CPT

## 2017-09-05 PROCEDURE — 99213 OFFICE O/P EST LOW 20 MIN: CPT | Performed by: ANESTHESIOLOGY

## 2017-09-05 PROCEDURE — G0463 HOSPITAL OUTPT CLINIC VISIT: HCPCS

## 2017-09-05 PROCEDURE — 80307 DRUG TEST PRSMV CHEM ANLYZR: CPT

## 2017-09-05 RX ORDER — OXYCODONE HYDROCHLORIDE 5 MG/1
5 TABLET ORAL 2 TIMES DAILY PRN
Qty: 60 TABLET | Refills: 0 | Status: SHIPPED | OUTPATIENT
Start: 2017-09-09 | End: 2017-10-09

## 2017-09-05 ASSESSMENT — PAIN DESCRIPTION - FREQUENCY: FREQUENCY: CONTINUOUS

## 2017-09-05 ASSESSMENT — PAIN DESCRIPTION - DESCRIPTORS: DESCRIPTORS: CONSTANT;SHARP;PINS AND NEEDLES

## 2017-09-05 ASSESSMENT — PAIN SCALES - GENERAL: PAINLEVEL_OUTOF10: 7

## 2017-09-05 ASSESSMENT — PAIN DESCRIPTION - PROGRESSION: CLINICAL_PROGRESSION: GRADUALLY WORSENING

## 2017-09-05 ASSESSMENT — PAIN DESCRIPTION - ONSET: ONSET: ON-GOING

## 2017-09-05 ASSESSMENT — PAIN DESCRIPTION - ORIENTATION: ORIENTATION: LOWER;RIGHT;LEFT

## 2017-09-07 LAB
6-ACETYLMORPHINE, UR: NOT DETECTED
7-AMINOCLONAZEPAM, URINE: NOT DETECTED
ALPHA-OH-ALPRAZ, URINE: NOT DETECTED
ALPRAZOLAM, URINE: NOT DETECTED
AMPHETAMINES, URINE: NOT DETECTED
BARBITURATES, URINE: NOT DETECTED
BENZOYLECGONINE, UR: NOT DETECTED
BUPRENORPHINE URINE: NOT DETECTED
CARISOPRODOL, UR: NOT DETECTED
CLONAZEPAM, URINE: NOT DETECTED
CODEINE, URINE: NOT DETECTED
CREATININE URINE: 173.4 MG/DL (ref 20–400)
DIAZEPAM, URINE: NOT DETECTED
EER PAIN MGT DRUG PANEL, HIGH RES/EMIT U: NORMAL
ETHYL GLUCURONIDE UR: PRESENT
FENTANYL URINE: NOT DETECTED
HYDROCODONE, URINE: NOT DETECTED
HYDROMORPHONE, URINE: NOT DETECTED
LORAZEPAM, URINE: NOT DETECTED
MARIJUANA METAB, UR: NOT DETECTED
MDA, UR: NOT DETECTED
MDEA, EVE, UR: NOT DETECTED
MDMA URINE: NOT DETECTED
MEPERIDINE METAB, UR: NOT DETECTED
METHADONE, URINE: NOT DETECTED
METHAMPHETAMINE, URINE: NOT DETECTED
METHYLPHENIDATE: NOT DETECTED
MIDAZOLAM, URINE: NOT DETECTED
MORPHINE URINE: NOT DETECTED
NORBUPRENORPHINE, URINE: NOT DETECTED
NORDIAZEPAM, URINE: NOT DETECTED
NORFENTANYL, URINE: NOT DETECTED
NORHYDROCODONE, URINE: NOT DETECTED
NOROXYCODONE, URINE: PRESENT
NOROXYMORPHONE, URINE: PRESENT
OXAZEPAM, URINE: NOT DETECTED
OXYCODONE URINE: PRESENT
OXYMORPHONE, URINE: PRESENT
PAIN MGT DRUG PANEL, HI RES, UR: NORMAL
PCP,URINE: NOT DETECTED
PHENTERMINE, UR: NOT DETECTED
PROPOXYPHENE, URINE: NOT DETECTED
TAPENTADOL, URINE: NOT DETECTED
TAPENTADOL-O-SULFATE, URINE: NOT DETECTED
TEMAZEPAM, URINE: NOT DETECTED
TRAMADOL, URINE: PRESENT
ZOLPIDEM, URINE: NOT DETECTED

## 2017-09-11 DIAGNOSIS — Z01.818 PRE-OP TESTING: Primary | ICD-10-CM

## 2017-09-14 RX ORDER — COLCHICINE 0.6 MG/1
TABLET ORAL
Qty: 10 TABLET | Refills: 2 | Status: SHIPPED | OUTPATIENT
Start: 2017-09-14 | End: 2017-09-20 | Stop reason: SDUPTHER

## 2017-09-19 ENCOUNTER — HOSPITAL ENCOUNTER (OUTPATIENT)
Dept: PHYSICAL THERAPY | Age: 57
Setting detail: THERAPIES SERIES
Discharge: HOME | End: 2017-09-19
Payer: COMMERCIAL

## 2017-09-20 ENCOUNTER — OFFICE VISIT (OUTPATIENT)
Dept: INTERNAL MEDICINE | Age: 57
End: 2017-09-20
Payer: COMMERCIAL

## 2017-09-20 VITALS
DIASTOLIC BLOOD PRESSURE: 78 MMHG | WEIGHT: 182.2 LBS | BODY MASS INDEX: 29.28 KG/M2 | HEIGHT: 66 IN | SYSTOLIC BLOOD PRESSURE: 114 MMHG | HEART RATE: 86 BPM

## 2017-09-20 DIAGNOSIS — M16.0 PRIMARY OSTEOARTHRITIS OF BOTH HIPS: Chronic | ICD-10-CM

## 2017-09-20 DIAGNOSIS — M96.1 POSTLAMINECTOMY SYNDROME, LUMBAR REGION: Chronic | ICD-10-CM

## 2017-09-20 DIAGNOSIS — E78.00 PURE HYPERCHOLESTEROLEMIA: ICD-10-CM

## 2017-09-20 DIAGNOSIS — I10 ESSENTIAL HYPERTENSION: Primary | ICD-10-CM

## 2017-09-20 PROCEDURE — 99213 OFFICE O/P EST LOW 20 MIN: CPT | Performed by: INTERNAL MEDICINE

## 2017-09-20 RX ORDER — OMEPRAZOLE 20 MG/1
CAPSULE, DELAYED RELEASE ORAL
Qty: 30 CAPSULE | Refills: 3 | Status: SHIPPED | OUTPATIENT
Start: 2017-09-20 | End: 2018-01-22 | Stop reason: SDUPTHER

## 2017-09-20 RX ORDER — COLCHICINE 0.6 MG/1
TABLET ORAL
Qty: 30 TABLET | Refills: 0 | Status: SHIPPED | OUTPATIENT
Start: 2017-09-20 | End: 2017-10-27 | Stop reason: SDUPTHER

## 2017-09-20 ASSESSMENT — ENCOUNTER SYMPTOMS
ALLERGIC/IMMUNOLOGIC NEGATIVE: 1
RESPIRATORY NEGATIVE: 1
BACK PAIN: 1
EYES NEGATIVE: 1
GASTROINTESTINAL NEGATIVE: 1

## 2017-09-21 ENCOUNTER — TELEPHONE (OUTPATIENT)
Dept: INTERNAL MEDICINE | Age: 57
End: 2017-09-21

## 2017-09-21 DIAGNOSIS — M79.671 PAIN IN BOTH FEET: Primary | ICD-10-CM

## 2017-09-21 DIAGNOSIS — M79.672 PAIN IN BOTH FEET: Primary | ICD-10-CM

## 2017-09-25 ENCOUNTER — TELEPHONE (OUTPATIENT)
Dept: INTERNAL MEDICINE | Age: 57
End: 2017-09-25

## 2017-09-28 NOTE — TELEPHONE ENCOUNTER
Patient called in and stated that she will call and get exact name for the brace and give us a call back.

## 2017-09-28 NOTE — TELEPHONE ENCOUNTER
Patient returned phone call and stated the name of the brace is Hip Abduction Brace. She also stated it can be faxed to [facility] at [phone] (S) when ready.

## 2017-10-06 ENCOUNTER — HOSPITAL ENCOUNTER (OUTPATIENT)
Age: 57
Discharge: HOME | End: 2017-10-06
Payer: COMMERCIAL

## 2017-10-06 DIAGNOSIS — Z01.818 PRE-OP TESTING: ICD-10-CM

## 2017-10-06 DIAGNOSIS — M25.551 RIGHT HIP PAIN: ICD-10-CM

## 2017-10-06 LAB
ALBUMIN SERPL-MCNC: 3.8 G/DL (ref 3.5–5.2)
ALBUMIN/GLOBULIN RATIO: 1.2 (ref 1–2.5)
ALP BLD-CCNC: 33 U/L (ref 35–104)
ALT SERPL-CCNC: 10 U/L (ref 5–33)
ANION GAP SERPL CALCULATED.3IONS-SCNC: 10 MMOL/L (ref 9–17)
AST SERPL-CCNC: 13 U/L
BILIRUB SERPL-MCNC: 0.3 MG/DL (ref 0.3–1.2)
BUN BLDV-MCNC: 13 MG/DL (ref 6–20)
BUN/CREAT BLD: ABNORMAL (ref 9–20)
C-REACTIVE PROTEIN: 3.5 MG/L (ref 0–5)
CALCIUM SERPL-MCNC: 9.1 MG/DL (ref 8.6–10.4)
CHLORIDE BLD-SCNC: 105 MMOL/L (ref 98–107)
CO2: 27 MMOL/L (ref 20–31)
CREAT SERPL-MCNC: 0.83 MG/DL (ref 0.5–0.9)
GFR AFRICAN AMERICAN: >60 ML/MIN
GFR NON-AFRICAN AMERICAN: >60 ML/MIN
GFR SERPL CREATININE-BSD FRML MDRD: ABNORMAL ML/MIN/{1.73_M2}
GFR SERPL CREATININE-BSD FRML MDRD: ABNORMAL ML/MIN/{1.73_M2}
GLUCOSE BLD-MCNC: 102 MG/DL (ref 70–99)
HCT VFR BLD CALC: 36.3 % (ref 36–46)
HEMOGLOBIN: 11.7 G/DL (ref 12–16)
MCH RBC QN AUTO: 26.6 PG (ref 26–34)
MCHC RBC AUTO-ENTMCNC: 32.2 G/DL (ref 31–37)
MCV RBC AUTO: 82.8 FL (ref 80–100)
PDW BLD-RTO: 16.3 % (ref 12.5–15.4)
PLATELET # BLD: 216 K/UL (ref 140–450)
PMV BLD AUTO: 9 FL (ref 6–12)
POTASSIUM SERPL-SCNC: 4.1 MMOL/L (ref 3.7–5.3)
RBC # BLD: 4.39 M/UL (ref 4–5.2)
SEDIMENTATION RATE, ERYTHROCYTE: 15 MM (ref 0–20)
SODIUM BLD-SCNC: 142 MMOL/L (ref 135–144)
TOTAL PROTEIN: 7 G/DL (ref 6.4–8.3)
WBC # BLD: 6 K/UL (ref 3.5–11)

## 2017-10-06 PROCEDURE — 36415 COLL VENOUS BLD VENIPUNCTURE: CPT

## 2017-10-06 PROCEDURE — 80053 COMPREHEN METABOLIC PANEL: CPT

## 2017-10-06 PROCEDURE — 85027 COMPLETE CBC AUTOMATED: CPT

## 2017-10-06 PROCEDURE — 86140 C-REACTIVE PROTEIN: CPT

## 2017-10-06 PROCEDURE — 85651 RBC SED RATE NONAUTOMATED: CPT

## 2017-10-09 ENCOUNTER — OFFICE VISIT (OUTPATIENT)
Dept: ORTHOPEDIC SURGERY | Age: 57
End: 2017-10-09
Payer: COMMERCIAL

## 2017-10-09 VITALS — WEIGHT: 185.8 LBS | HEIGHT: 66 IN | BODY MASS INDEX: 29.86 KG/M2

## 2017-10-09 DIAGNOSIS — M25.351 HIP INSTABILITY, RIGHT: Primary | ICD-10-CM

## 2017-10-09 PROCEDURE — 99213 OFFICE O/P EST LOW 20 MIN: CPT | Performed by: ORTHOPAEDIC SURGERY

## 2017-10-09 NOTE — PROGRESS NOTES
[address] [place] [address]  Dept: [phone]  Dept Fax: [phone]        Ambulatory Follow Up      Subjective:   Maribel Miles is a 62y.o. year old female who presents to our office today for routine followup regarding her   1. Hip instability, right    . Chief Complaint   Patient presents with    Hip Pain     Right       HPI  Presents to follow-up on her right hip. Patient continues to wear the abduction brace. She reports that she called physical therapy but has not started therapy as they were busy and fall at the time when she called. She wears the brace nearly 24 hours per day. She was in pain management but was discharged from pains management because she was found to be positive for marijuana. Patient states that she does smoke marijuana occasionally and she feels that she gets pain relief from it. She has not dislocated since the revision surgery in November 2016. Review of Systems    Review of Systems - General ROS: negative for - chills, fatigue, fever or night sweats  Hematological and Lymphatic ROS: negative for - bleeding problems   Respiratory ROS: no cough, shortness of breath, or wheezing   Cardiovascular ROS: no chest pain or dyspnea on exertion   Gastrointestinal ROS: no abdominal pain, change in bowel habits  Musculoskeletal ROS: positive for - pain in limb   Neurological ROS: negative for - numbness/tingling or weakness       Objective :   General: Maribel Miles is a 62 y.o. female who is alert and oriented and sitting comfortably in our office. Ortho Exam  MS:  RLE: Hip abduction brace in place. Negative log roll. Soft, compressible compartments. 2+ DP/PT pulses. TA/EHL/FHL/GS motor intact. Sensation to light touch intact to superficial peroneal/deep peroneal/sural/saphenous nerves. Radiology:   None today    Assessment:      1.  Hip instability, right       Plan:      Condition with Dr. Dillon Rhoades and patient regarding her condition. This point we recommend that she performs physical therapy to strengthen her hip musculature and to improve her gait mechanics. And tried to get her out of the hip abduction brace as it has been 1 year after the revision surgery without any dislocation at this point. Patient understands but states that she psychologically feels that she cannot go without her brace at this point but she will continue to try as her body in mind allow her to. Follow up as needed or after therapy. Follow up:Return if symptoms worsen or fail to improve. No orders of the defined types were placed in this encounter. No orders of the defined types were placed in this encounter.       Electronically signed by Claudine Rangel DO on 10/9/2017 at 12:14 PM

## 2017-10-10 ENCOUNTER — TELEPHONE (OUTPATIENT)
Dept: INTERNAL MEDICINE | Age: 57
End: 2017-10-10

## 2017-10-10 DIAGNOSIS — S73.014D: ICD-10-CM

## 2017-10-10 DIAGNOSIS — M17.0 PRIMARY OSTEOARTHRITIS OF BOTH KNEES: ICD-10-CM

## 2017-10-10 DIAGNOSIS — M96.1 POSTLAMINECTOMY SYNDROME, LUMBAR REGION: Primary | Chronic | ICD-10-CM

## 2017-10-10 NOTE — TELEPHONE ENCOUNTER
PC from patient-- she states that at her last appt she spoke with PCP about getting new referral for pain clinic--- was not sure where she wanted to go-- called around and [facility]----referral pended--- needs to be faxed to [phone]

## 2017-10-10 NOTE — PROGRESS NOTES
I performed a history and physical examination of the patient and discussed management with the resident. I reviewed the residents note and agree with the documented findings and plan of care. Any areas of disagreement are noted on the chart. I have personally evaluated this patient and have completed at least one if not all key elements of the E/M (history, physical exam, and MDM). Additional findings are as noted. I agree with the chief complaint, past medical history, past surgical history, allergies, medications, social and family history as documented unless otherwise noted below.      Electronically signed by Loi Murillo DO on 10/9/2017 at 10:21 PM

## 2017-10-12 ENCOUNTER — HOSPITAL ENCOUNTER (OUTPATIENT)
Dept: PHYSICAL THERAPY | Age: 57
Setting detail: THERAPIES SERIES
Discharge: HOME | End: 2017-10-12
Payer: COMMERCIAL

## 2017-10-12 PROCEDURE — G8979 MOBILITY GOAL STATUS: HCPCS

## 2017-10-12 PROCEDURE — 97162 PT EVAL MOD COMPLEX 30 MIN: CPT

## 2017-10-12 PROCEDURE — G8978 MOBILITY CURRENT STATUS: HCPCS

## 2017-10-12 NOTE — CONSULTS
[x] Ramón Moses        Outpatient Physical                Therapy       [address]       Phone: [phone]       Fax: [phone] [] [facility] at [address] [facility]       Phone: [phone]       Fax: [phone] [] [facility] [place] [address] Promotion    [address] [place] [address]     Phone: [phone]     Fax:  [phone]     Physical Therapy Lower Extremity Evaluation    Date:  10/12/2017  Patient: Alex Blankenship  : 1960  MRN: [id number]  Physician: Lisbeth Schreiber DO   Insurance: Tosha Mayen Dual Benefits  Medical Diagnosis: right hip pain (M25.551); Rehab Codes: muscle weakness (M62.81); abnormality of gait (R26.89); pain right hip (M25.551); stiffness right hip (M25.651); adherent scar (L90.5); difficulty walking (R26.2)  Onset date: 2016  Next 's appt. : to be determined    Subjective:   CC:pain all the time, soreness around the incision and in the left hip  HPI: (onset date) fal with dislocation of right toal hip prosthesis and right hip revision 2016 in hospital until 2016 then discharged home  PMHx: [] Unremarkable [] Diabetes [] HTN  [] Pacemaker   [] MI/Heart Problems [] Cancer [] Arthritis [] Other:              [] Refer to full medical chart  In EPIC   Tests: [x] X-Ray: [] MRI:  [x] Other: vascular studies (gout)    Medications: [x] Refer to full medical record [] None [] Other:  Allergies:      [x] Refer to full medical record [] None [] Other:    Function:  Hand Dominance  [x] Right  [] Left  Working:  [] Normal Duty  [] Light Duty  [] Off D/T Condition  [] Retired    [x] Not Employed    []  Disability  [] Other:           Return to work:   Job/ADL Description:      Pain:  [x] Yes  [] No Location: right hip and thigh (also intermittent low back pain and bilateral shoulder pain)   Pain Rating: (0-10 scale) varies/10  Pain altered Tx:  [] Yes  [] No  Action:  Symptoms:  [] Improving [] Worsening [x] Same  Better:  [] AM    [] PM    [] Sit    [] Rise/Sit    []Stand    [] Walk    [] Lying    [x] Other:rest; pain meds, elevation  Worse: [] AM    [] PM    [] Sit    [] Rise/Sit    []Stand    [] Walk    [] Lying    [] Bend                             [] Valsalva    [x] Other activities:  Sleep: [] OK    [x] Disturbed    Objective:    ROM  ° A/P STRENGTH TESTS (+/-) Left Right Not Tested    Left Right Left Right Ant.  Drawer   []   Hip Flex 95 ° 90 ° 3+/5 5/5 Post. Drawer   []   Ext 15 ° 10 ° 3+/5 5-5 Lachmans   []   ER     Valgus Stress   []   IR     Varus Stress   []   ABD 35 ° 15 ° 3+/5 4+/5 Teres   []   ADD   3+/5 5-/5 Apleys Comp.   []   Knee Flex 130 ° 110 ° 4/5 5/5 Apleys Dist.   []   Ext 0° 0 ° 4-/5 4+/5 Hip Scouring   []   Ankle DF     CELIs   []   PF     Piriformis   []   INV     Megans   []   EVER     Talor Tilt   []        Pat-Fem Grind   []       OBSERVATION No Deficit Deficit Not Tested Comments   Posture       Forward Head [] [x] [] mild   Rounded Shoulders [] [x] [] Mild bilateral   Kyphosis [x] [] []    Lordosis [x] [] []    Lateral Shift [] [x] [] To the left   Scoliosis [] [] [x]    Iliac Crest [x] [] []    PSIS [x] [] []    ASIS [x] [] []    Genu Valgus [x] [] []    Genu Varus [x] [] []    Genu Recurvatum [x] [] []    Pronation [x] [] []    Supination [x] [] []    Leg Length Discrp [x] [] []    Slumped Sitting [x] [] []    Palpation [] [x] [] Tenderness along the incision   Sensation [x] [] []    Edema [x] [] []    Neurological [x] [] []    Patellar Mobility [] [] [x]    Patellar Orientation [] [] [x]    Gait [] [x] [] Analysis:patient ambulates with her hip knee orthosis and a wide based gait with some side to side motion  - states her orthotist, Oliver Gray, advised her to walk with a wide-based gait         FUNCTION Normal Difficult Unable   Sitting [] [] []   Standing [] [] []   Ambulation [] [] []   Groom/Dress [] [] []   Lift/Carry [] [] []   Stairs [] [] [] Bending [] [] []   Squat [] [] []   Kneel [] [] []     BALANCE/PROPRIOCEPTION              [] Not tested   Single leg stance       R                     L                                PAIN   Eyes open                    5         Sec.        5          Sec                  . [x]    Eyes closed                         Sec. Sec                  . []        FUNCTIONAL TESTS PAIN NO PAIN COMMENTS   Step Test 4 [] []    6 [] []    8 [] []    Squat [] []        Comments:Lower Extremity Functional Scale 84% impairment   patient ambulates with a hip abduction orthosis on the right  Assessment:  Problems:    [x] ? Pain:left hip     [x] ? ROM:left hip abduction    [x] ? Strength: Left hip and knee muscles   [x] ? Function:    [x] ? Balance  [] Edema:  [x] Postural Deviations  [x] Gait Deviations  [] Other:      STG: (to be met in 8 treatments)  1. ? Pain: left hip to 5/10 or less with activity and 3/10 or less at rest  2. ? ROM:right hip abduction to 30 °  3. ? Strength:  4. ? Function:patient will have improvement in Lower Extremity Functional scale with a score of \"3\" or better walking between rooms and a score of \"2\" or better for rolling over in bed and a score of   5. Independent with Home Exercise Programs  6. Demonstrate Knowledge of joint protection for the right hip  LTG: (to be met in 16 treatments)  1. Patient will report no feeling of instability when removing orthotic and ambulating around her home  2. Patient will have decreased reliance on her pravin abduction orthotic  3.  Patient ill report a score of 4 or better on three parameters of the Lower Extremity Functional Scale                   Patient goals:to get to the place where I can function without brace, lessen the pain,be more active and regain mobility        Rehab Potential:  [x] Good  [] Fair  [] Poor   Suggested Professional Referral:  [x] No  [] Yes:  Barriers to Goal Achievement[de-identified]  [x] No  [] Yes:  Domestic Concerns:  [x] No  [] Yes: Pt. Education:  [x] Plans/Goals, Risks/Benefits discussed  [x] Home exercise program    Method of Education: [x] Verbal  [] Demo  [] Written  Comprehension of Education:  [x] Verbalizes understanding. [] Demonstrates understanding. [] Needs Review. [] Demonstrates/verbalizes understanding of HEP/Ed previously given. Treatment Plan:  [x] Therapeutic Exercise    [] Modalities:  [x] Therapeutic Activity    [] Ultrasound  [] Electrical Stimulation  [] Gait Training     [] Massage       [] Lumbar/Cervical Traction  [] Neuromuscular Re-education [] Cold/hotpack [] Iontophoresis: 4 mg/mL  [x] Instruction in HEP             Dexamethasone Sodium  [] Manual Therapy             Phosphate 40-80 mAmin  [] Aquatic Therapy  [] Vasocompression/    [] Other:       Game Ready    []  Medication allergies reviewed for use of    Dexamethasone Sodium Phosphate 4mg/ml     with iontophoresis treatments. Pt is not allergic. Frequency:  2 x/week for 16 visits        Todays Treatment:  Modalities:   Precautions:  Exercises:  Exercise Reps/ Time Weight/ Level Comments                                 Other:    Specific Instructions for next treatment:emphasis on strengthening, scar massage- begin with isometric exercises for the hip and knee      Treatment Charges: Mins Units   [x] Evaluation       []  Low       [x]  Moderate       []  High 35 1   []  Modalities     []  Ther Exercise     []  Manual Therapy     []  Ther Activities     []  Aquatics     []  Vasocompression     []  Other       TOTAL TREATMENT TIME: 35    Time in:1300   Time CPK:3936    Electronically signed by: Tiana Acosta, PT        Physician Signature:________________________________Date:__________________  By signing above or cosigning this note, I have reviewed this plan of care and certify a need for medically necessary rehabilitation services.      *PLEASE SIGN ABOVE AND FAX BACK ALL PAGES*

## 2017-10-25 NOTE — PRE-CERTIFICATION NOTE
[x] Mati Lawrence   Outpatient Physical Therapy  [address]  Phone: [phone]  Fax: [phone] [] [facility] at [address] [place] [address]  Phone: [phone]  Fax: [phone] [] [facility] [address] [place] [address]  Phone: [phone]  Fax: [phone]        Physical Therapy Pre-certification Note      10/25/2017    [facility]  1960   [id number]      Insurance approval was received for Physical Therapy from Maria A Luis on 10/25/17. Approval was received for 9 visits, from 10/26/17 to 11/26/17. Authorization number [id number]. Patient was contacted to be scheduled and voicemail was left for patient to call us back and schedule. .      Electronically signed by Cindy Espinosa PT on 10/25/2017 at 10:08 AM

## 2017-10-26 NOTE — FLOWSHEET NOTE
[] Nicki Butt  Outpatient Physical Therapy  [address]  Phone: [phone]  Fax: [phone] [] [facility] at [address] [place] [address]  Phone: [phone]  Fax: [phone] [] [facility]  [address] [place] [address]  Phone: [phone]  Fax: [phone]   [facility]   1960   [id number]    10/26/2017    Called patient and again left a message to patient to call and schedule her Physical Therapy as we have received authorization from her insurance company. Reminded patient that the insurance provider only gives 30 days to get the requested visits in.     Leo Mosquera, PT

## 2017-10-27 RX ORDER — HYDROCODONE BITARTRATE AND ACETAMINOPHEN 5; 325 MG/1; MG/1
1 TABLET ORAL EVERY 8 HOURS PRN
Qty: 15 TABLET | Refills: 0 | Status: SHIPPED | OUTPATIENT
Start: 2017-10-27 | End: 2017-12-12 | Stop reason: SDUPTHER

## 2017-10-27 NOTE — TELEPHONE ENCOUNTER
Phone call from patient stating that she needs something for pain she states that has she is still waiting for pain management to contact her she states that she will also be starting physical therapy and will not be able to if she do not have anything for pain, please review and advise. Health Maintenance   Topic Date Due    Diabetic foot exam  06/27/1970    Diabetic retinal exam  06/27/1970    Lipid screen  07/12/2017    DTaP/Tdap/Td vaccine (1 - Tdap) 11/04/2017 (Originally 6/27/1979)    Hepatitis C screen  11/04/2017 (Originally 1960)    HIV screen  11/04/2017 (Originally 6/27/1975)    Breast cancer screen  08/04/2018 (Originally 6/27/2010)    Cervical cancer screen  09/20/2018 (Originally 6/27/1981)    Flu vaccine (1) 09/20/2018 (Originally 9/1/2017)    Pneumococcal med risk (1 of 1 - PPSV23) 09/20/2018 (Originally 6/27/1979)    Diabetic hemoglobin A1C test  05/26/2018    Diabetic microalbuminuria test  08/18/2018    Colon cancer screen colonoscopy  12/29/2024       Hemoglobin A1C (%)   Date Value   05/26/2017 6.2   11/21/2016 5.6   02/27/2015 5.9             ( goal A1C is < 7)   Microalb/Crt.  Ratio (mcg/mg creat)   Date Value   08/18/2017 21     LDL Cholesterol (mg/dL)   Date Value   07/12/2016 133 (H)       (goal LDL is <100)   AST (U/L)   Date Value   10/06/2017 13     ALT (U/L)   Date Value   10/06/2017 10     BUN (mg/dL)   Date Value   10/06/2017 13     BP Readings from Last 3 Encounters:   09/20/17 114/78   09/05/17 (!) 142/90   08/09/17 125/84          (goal 120/80)      Next Visit Date:  12/22/2017    Patient Active Problem List:     Osteoarthritis of both knees     Osteoarthritis of both shoulders     DJD (degenerative joint disease), lumbosacral     Essential hypertension     Hyperlipidemia     Depression     Obesity     Postlaminectomy syndrome, lumbar region     Shoulder bursitis     S/P lumbar spinal fusion     Encounter for medication monitoring     Chronic pain syndrome Posterior dislocation of hip ([address]     History of total right hip replacement     Frequent PVCs     Acute cystitis without hematuria     Primary osteoarthritis of both hips     Right hip pain     History of total right hip arthroplasty     Hip instability

## 2017-10-30 NOTE — TELEPHONE ENCOUNTER
Pt calling back regarding script for pain medication. Pt was told that script was written and is in the narcotic's book.

## 2017-11-07 ENCOUNTER — HOSPITAL ENCOUNTER (OUTPATIENT)
Dept: PHYSICAL THERAPY | Age: 57
Setting detail: THERAPIES SERIES
Discharge: HOME | End: 2017-11-07
Payer: COMMERCIAL

## 2017-11-07 PROCEDURE — 97110 THERAPEUTIC EXERCISES: CPT

## 2017-11-07 NOTE — FLOWSHEET NOTE
exercise per log. Manual scar massage R lateral hip  x 6 mins      Specific Instructions for next treatment: begin with isometric for hip and knee, scar massage  emphasis on strengthening,     Treatment Charges: Mins Units   []  Modalities     [x]  Ther Exercise 45 0   [x]  Manual Therapy  6 0   []  Ther Activities     []  Aquatics     []  Vasocompression     []  Other     Total Treatment time  51 3       Assessment: [x] Progressing toward goals iniated exercises per log, completed without increased pain. Pt request fatigue with hip adduction and demonstrates some apprehension with hip abduction. [] No change. [] Other:     STG: (to be met in 8 treatments)  1. ? Pain: left hip to 5/10 or less with activity and 3/10 or less at rest  2. ? ROM:right hip abduction to 30 °  3. ? Strength:  4. ? Function:patient will have improvement in Lower Extremity Functional scale with a score of \"3\" or better walking between rooms and a score of \"2\" or better for rolling over in bed and a score of   5. Independent with Home Exercise Programs  6. Demonstrate Knowledge of joint protection for the right hip  LTG: (to be met in 16 treatments)  1. Patient will report no feeling of instability when removing orthotic and ambulating around her home  2. Patient will have decreased reliance on her pravin abduction orthotic  3. Patient ill report a score of 4 or better on three parameters of the Lower Extremity Functional Scale                    Patient goals:to get to the place where I can function without brace, lessen the pain,be more active and regain mobility    Pt. Education:  [x] Yes  [] No  [] Reviewed Prior HEP/Ed  Method of Education: [x] Verbal  [x] Demo  [x] Written  Comprehension of Education:  [x] Verbalizes understanding. [] Demonstrates understanding. [x] Needs review. HS sets. [x] Demonstrates/verbalizes HEP/Ed previously given.   11/7/17: quad sets gluteal sets, HS sets, LAQ, ankle DF     Plan: [x] Continue per

## 2017-11-09 ENCOUNTER — HOSPITAL ENCOUNTER (OUTPATIENT)
Dept: PHYSICAL THERAPY | Age: 57
Setting detail: THERAPIES SERIES
Discharge: HOME | End: 2017-11-09
Payer: COMMERCIAL

## 2017-11-09 PROCEDURE — 97140 MANUAL THERAPY 1/> REGIONS: CPT

## 2017-11-09 PROCEDURE — 97110 THERAPEUTIC EXERCISES: CPT

## 2017-11-09 NOTE — FLOWSHEET NOTE
mins using general and point specific instruments     Specific Instructions for next treatment: begin with isometric for hip and knee, scar massage  emphasis on strengthening,     Treatment Charges: Mins Units   []  Modalities     [x]  Ther Exercise 43 3   [x]  Manual Therapy  11 1   []  Ther Activities     []  Aquatics     []  Vasocompression     []  Other     Total Treatment time  54         Assessment: [x] Progressing toward goals     [] No change. [] Other:     STG: (to be met in 8 treatments)  1. ? Pain: left hip to 5/10 or less with activity and 3/10 or less at rest  2. ? ROM:right hip abduction to 30 °  3. ? Strength:  4. ? Function:patient will have improvement in Lower Extremity Functional scale with a score of \"3\" or better walking between rooms and a score of \"2\" or better for rolling over in bed and a score of   5. Independent with Home Exercise Programs  6. Demonstrate Knowledge of joint protection for the right hip  LTG: (to be met in 16 treatments)  1. Patient will report no feeling of instability when removing orthotic and ambulating around her home  2. Patient will have decreased reliance on her pravni abduction orthotic  3. Patient ill report a score of 4 or better on three parameters of the Lower Extremity Functional Scale                    Patient goals:to get to the place where I can function without brace, lessen the pain,be more active and regain mobility    Pt. Education:  [x] Yes  [] No  [] Reviewed Prior HEP/Ed  Method of Education: [x] Verbal  [x] Demo  [x] Written  Comprehension of Education:  [x] Verbalizes understanding. [] Demonstrates understanding. [x] Needs review. HS sets. [x] Demonstrates/verbalizes HEP/Ed previously given. 11/7/17: quad sets gluteal sets, HS sets, LAQ, ankle DF     Plan: [x] Continue per plan of care.    [] Other:       Time In: 12:37          Time Out: 1:40     Electronically signed by:  Jhon Celaya PT

## 2017-11-14 ENCOUNTER — HOSPITAL ENCOUNTER (OUTPATIENT)
Dept: PHYSICAL THERAPY | Age: 57
Setting detail: THERAPIES SERIES
Discharge: HOME | End: 2017-11-14
Payer: COMMERCIAL

## 2017-11-14 ENCOUNTER — TELEPHONE (OUTPATIENT)
Dept: ORTHOPEDIC SURGERY | Age: 57
End: 2017-11-14

## 2017-11-14 PROCEDURE — 97110 THERAPEUTIC EXERCISES: CPT

## 2017-11-14 NOTE — FLOWSHEET NOTE
Standing        Hip extension- rt 15x A Brace off, increased reps 11/14   Hip abd rt 15x A Brace off, Added    Hip hikes tyler  10x  Added 11/14   Hip 3 way left 15 x ea A  brace on R LE   Marching brace on 50 x  Pt increased reps without direction 11/14   Heel raises 20 x                       Side lying brace off   Pillow btw knees/ankle added all side lying 11/14   Clam shells 15x      Hip abd 10x AA Small ROM   Hip add 10x AA Small ROM. Other: completed exercise per log. Pt voices frustration/discouragement at times, encouragement given. Manual scar massage R lateral hip and trial of Hawk  IASTM  x 11 mins using general and point specific instruments-held due to time constraints 11/14/178     Specific Instructions for next treatment: Insurance authorization expires on 11/26/2017. [facility] EXERCISE scar massage  emphasis on strengthening,     Treatment Charges: Mins Units   []  Modalities     [x]  Ther Exercise  40 3   [x]  Manual Therapy     []  Ther Activities     []  Aquatics     []  Vasocompression     []  Other     Total Treatment time   40  3       Assessment: [x] Progressing toward goals added several exercises per log for strengthening, stability,  and ROM. [] No change. [x] Other: Faitques easily with most all exercises. STG: (to be met in 8 treatments)  1. ? Pain: left hip to 5/10 or less with activity and 3/10 or less at rest  2. ? ROM:right hip abduction to 30 °  3. ? Strength:  4. ? Function:patient will have improvement in Lower Extremity Functional scale with a score of \"3\" or better walking between rooms and a score of \"2\" or better for rolling over in bed and a score of   5. Independent with Home Exercise Programs  6. Demonstrate Knowledge of joint protection for the right hip  LTG: (to be met in 16 treatments)  1. Patient will report no feeling of instability when removing orthotic and ambulating around her home  2.  Patient will have decreased reliance on her pravin abduction orthotic  3. Patient ill report a score of 4 or better on three parameters of the Lower Extremity Functional Scale                    Patient goals:to get to the place where I can function without brace, lessen the pain,be more active and regain mobility    Pt. Education:  [x] Yes  [] No  [x] Reviewed Prior HEP/Ed  Method of Education: [x] Verbal  [] Demo  [] Written  Comprehension of Education:  [x] Verbalizes understanding. [] Demonstrates understanding. [x] Needs review. [x] Demonstrates/verbalizes HEP/Ed previously given. 11/7/17: quad sets gluteal sets, HS sets, LAQ, ankle DF     Plan: [x] Continue per plan of care.    [] Other:       Time In: 130   pm        Time Out: 210 pm     Electronically signed by:  Rafael Reid PTA

## 2017-11-17 ENCOUNTER — HOSPITAL ENCOUNTER (OUTPATIENT)
Dept: PHYSICAL THERAPY | Age: 57
Setting detail: THERAPIES SERIES
Discharge: HOME | End: 2017-11-17
Payer: COMMERCIAL

## 2017-11-21 ENCOUNTER — HOSPITAL ENCOUNTER (OUTPATIENT)
Dept: PHYSICAL THERAPY | Age: 57
Setting detail: THERAPIES SERIES
Discharge: HOME | End: 2017-11-21
Payer: COMMERCIAL

## 2017-11-21 PROCEDURE — 97110 THERAPEUTIC EXERCISES: CPT

## 2017-11-21 NOTE — FLOWSHEET NOTE
A  1.5 lbs          Supine brace off       Quad sets 15x5\"  Increased reps 11/14   HS sets 10x5\"     Gluteal sets 15x5\"  Increased reps 11/14   Hip adduction iso 10x5\"  ball   SAQ 15x 1 lbs Omitted in error 11/14/17   Heel slides 15x 1 lbs Orange slider   Gentte Hip abduction    10x  orange slider   bridging 5x  Held after 5 reps due to 9/10 pain L LE 11/21         Standing     UE support    3 way hip R  OKC 8x ea 1.5 lbs Brace off, caused increased L hip/thigh pain 11/21   Hip hikes tyler  10x  Added 11/14   Hip 3 way left 15 x ea 1.5 lbs   brace on R LE with L OKC, progressed wt 11/21   Marching brace on 10 x  10x A  1.5 lbs     Heel/toe raises 20 x     Step ups  15x 4 in Added 11/21   Lateral step ups  15x 4 in Added 11/21   Shallow squats 15x     Side lying brace off   Pillow btw knees/ankle added all side lying 11/14   Clam shells 15x      Hip abd 10x AA Small ROM                       Other: Completed bold typed  exercises. Pt declined HP and IASTM R hip this date. Recommendations to follow up with MD about complaints of gout and  L hip pain 9/10. Pain significantly altered PT session this date. Unable to schedule further PT sessions due to insurance authorization thur 11/26/17. Manual scar massage R lateral hip and trial of Hawk  IASTM  x 11 mins using general and point specific instruments-held due to time constraints 11/14/178     Specific Instructions for next treatment:    [facility] EXERCISE scar massage  emphasis on strengthening,     Treatment Charges: Mins Units   []  Modalities     [x]  Ther Exercise  40 3   [x]  Manual Therapy     []  Ther Activities     []  Aquatics     []  Vasocompression     []  Other     Total Treatment time  40 3       Assessment: [] Progressing toward goals      [] No change. [x] Other:  Completed exercises per log and increased  R LE PRE as listed in log but pain increased L LE with pt requesting to complete session due to 9/10 L hip/thigh pain.         STG: (to be met in 8 treatments)  1. ? Pain: left hip to 5/10 or less with activity and 3/10 or less at rest  2. ? ROM:right hip abduction to 30 °  3. ? Strength:  4. ? Function:patient will have improvement in Lower Extremity Functional scale with a score of \"3\" or better walking between rooms and a score of \"2\" or better for rolling over in bed and a score of   5. Independent with Home Exercise Programs  6. Demonstrate Knowledge of joint protection for the right hip  LTG: (to be met in 16 treatments)  1. Patient will report no feeling of instability when removing orthotic and ambulating around her home  2. Patient will have decreased reliance on her pravin abduction orthotic  3. Patient ill report a score of 4 or better on three parameters of the Lower Extremity Functional Scale                    Patient goals:to get to the place where I can function without brace, lessen the pain,be more active and regain mobility    Pt. Education:  [x] Yes  [] No  [x] Reviewed Prior HEP/Ed  Method of Education: [x] Verbal  [] Demo  [] Written  Comprehension of Education:  [x] Verbalizes understanding. [] Demonstrates understanding. [x] Needs review. [x] Demonstrates/verbalizes HEP/Ed previously given. 11/7/17: quad sets gluteal sets, HS sets, LAQ, ankle DF     Plan: [x] Continue per plan of care. [x] Other:[facility] PT [facility]  can be extended to complete 16 PT sessions as requested per POC.       Time In: 1115   am        Time Out: 1200   pm     Electronically signed by:  Rafael eRid PTA

## 2017-11-27 ENCOUNTER — OFFICE VISIT (OUTPATIENT)
Dept: ORTHOPEDIC SURGERY | Age: 57
End: 2017-11-27
Payer: COMMERCIAL

## 2017-11-27 VITALS — WEIGHT: 191 LBS | HEIGHT: 66 IN | BODY MASS INDEX: 30.7 KG/M2

## 2017-11-27 DIAGNOSIS — T84.028D INSTABILITY OF PROSTHETIC HIP, SUBSEQUENT ENCOUNTER: Primary | ICD-10-CM

## 2017-11-27 DIAGNOSIS — Z96.649 INSTABILITY OF PROSTHETIC HIP, SUBSEQUENT ENCOUNTER: Primary | ICD-10-CM

## 2017-11-27 PROBLEM — T84.028A INSTABILITY OF PROSTHETIC HIP (HCC): Status: ACTIVE | Noted: 2017-11-27

## 2017-11-27 PROCEDURE — 1036F TOBACCO NON-USER: CPT | Performed by: ORTHOPAEDIC SURGERY

## 2017-11-27 PROCEDURE — 3017F COLORECTAL CA SCREEN DOC REV: CPT | Performed by: ORTHOPAEDIC SURGERY

## 2017-11-27 PROCEDURE — G8484 FLU IMMUNIZE NO ADMIN: HCPCS | Performed by: ORTHOPAEDIC SURGERY

## 2017-11-27 PROCEDURE — G8427 DOCREV CUR MEDS BY ELIG CLIN: HCPCS | Performed by: ORTHOPAEDIC SURGERY

## 2017-11-27 PROCEDURE — 3014F SCREEN MAMMO DOC REV: CPT | Performed by: ORTHOPAEDIC SURGERY

## 2017-11-27 PROCEDURE — 99213 OFFICE O/P EST LOW 20 MIN: CPT | Performed by: ORTHOPAEDIC SURGERY

## 2017-11-27 PROCEDURE — G8417 CALC BMI ABV UP PARAM F/U: HCPCS | Performed by: ORTHOPAEDIC SURGERY

## 2017-12-04 NOTE — PRE-CERTIFICATION NOTE
[x] Omar Walsh   Outpatient Physical Therapy  [address] [place].  Phone: [phone]  Fax: [phone] [] [facility] at [address] [place] [address]  Phone: [phone]  Fax: [phone] [] [place]. [address] [place] [address]  Phone: [phone]  Fax: [phone]        Physical Therapy Pre-certification Note      12/4/2017    [facility]  1960   [id number]      Ins[facility] approval was [facility] [place] [facility] on 12/4/17. Approval was received for 3 visits, from 12/4/17 to 1/4/17. Authorization number [id number].       Electronically signed by Chavez Silva PTA on 12/4/2017 at 11:42 AM

## 2017-12-12 ENCOUNTER — OFFICE VISIT (OUTPATIENT)
Dept: INTERNAL MEDICINE | Age: 57
End: 2017-12-12
Payer: COMMERCIAL

## 2017-12-12 VITALS
HEART RATE: 92 BPM | DIASTOLIC BLOOD PRESSURE: 84 MMHG | BODY MASS INDEX: 31.33 KG/M2 | WEIGHT: 194 LBS | SYSTOLIC BLOOD PRESSURE: 132 MMHG

## 2017-12-12 DIAGNOSIS — R73.03 PREDIABETES: ICD-10-CM

## 2017-12-12 DIAGNOSIS — I10 ESSENTIAL HYPERTENSION: Primary | ICD-10-CM

## 2017-12-12 DIAGNOSIS — M16.0 PRIMARY OSTEOARTHRITIS OF BOTH HIPS: Chronic | ICD-10-CM

## 2017-12-12 DIAGNOSIS — F17.200 SMOKER: ICD-10-CM

## 2017-12-12 DIAGNOSIS — Z12.39 SCREENING FOR BREAST CANCER: ICD-10-CM

## 2017-12-12 PROCEDURE — 1036F TOBACCO NON-USER: CPT | Performed by: INTERNAL MEDICINE

## 2017-12-12 PROCEDURE — G8427 DOCREV CUR MEDS BY ELIG CLIN: HCPCS | Performed by: INTERNAL MEDICINE

## 2017-12-12 PROCEDURE — 3014F SCREEN MAMMO DOC REV: CPT | Performed by: INTERNAL MEDICINE

## 2017-12-12 PROCEDURE — 99214 OFFICE O/P EST MOD 30 MIN: CPT | Performed by: INTERNAL MEDICINE

## 2017-12-12 PROCEDURE — G8484 FLU IMMUNIZE NO ADMIN: HCPCS | Performed by: INTERNAL MEDICINE

## 2017-12-12 PROCEDURE — G8417 CALC BMI ABV UP PARAM F/U: HCPCS | Performed by: INTERNAL MEDICINE

## 2017-12-12 PROCEDURE — 3017F COLORECTAL CA SCREEN DOC REV: CPT | Performed by: INTERNAL MEDICINE

## 2017-12-12 RX ORDER — HYDROCODONE BITARTRATE AND ACETAMINOPHEN 5; 325 MG/1; MG/1
1 TABLET ORAL DAILY PRN
Qty: 15 TABLET | Refills: 0 | Status: SHIPPED | OUTPATIENT
Start: 2017-12-12 | End: 2018-04-04

## 2017-12-12 NOTE — PATIENT INSTRUCTIONS
Your medications for this visit were escribed to your preferred pharmacy. You were given a printed script for [address] were also given a referral to [facility] weight management and bariatrics. Please call and schedule your own appointment . You were also given lab orders which do not require fasting. The doctor would like these labs completed before your next visit. You were also given an order for an mammogram which needs to  Be completed before your next visit.     ms

## 2017-12-15 NOTE — PROGRESS NOTES
Subjective:      Patient ID: Chary Millan is a 62 y.o. female. HPI Pt here to follow up on  Hip pain sec to osteoarthritis- currently doing physical therapy  Also following with ortho, they have ordered a CT to evaluate loosening of the prosthesis    H/o prediabetes- not on any medications A1C-6.2 05/17    Htn-BP stable on Amlodipine 10 mg    Current active smoker    Needs screening for breast cancer    Review of Systems  Respiratory: Negative for cough, shortness of breath, wheezing and stridor. Cardiovascular: Negative for chest pain, palpitations and leg swelling. Gastrointestinal: Negative for nausea, vomiting, abdominal pain, diarrhea and constipation. Objective:   Physical Exam  Constitutional: She is oriented to person, place, and time. She appears well-developed. No distress. Neck: Normal range of motion. Neck supple. Cardiovascular: Normal rate and regular rhythm. Pulmonary/Chest: Effort normal and breath sounds normal. No stridor. No respiratory distress. no wheezes. no rales. Abdominal: Soft. Bowel sounds are normal.  no distension. There is no tenderness. There is no rebound and no guarding. Musculoskeletal:  no edema and no tenderness. Assessment:     1. Essential hypertension    2. Prediabetes    3. Screening for breast cancer    4. Primary osteoarthritis of both hips    5. Smoker         Plan:   1. Continue Amlodipine 10 mg. Check lipids  2. Check A1C  3. Mammogram  4. Continue physical therapy, ortho follow up  Will prescribe 15 tablets of norco to help her get through physical therapy  But this will not be an ongoing prescription, I have explained this to her and she voiced understanding. 5. Nicotine gum  6. Return in about 3 months (around 3/12/2018).

## 2017-12-22 RX ORDER — AMLODIPINE BESYLATE 10 MG/1
10 TABLET ORAL DAILY
Qty: 30 TABLET | Refills: 6 | Status: SHIPPED | OUTPATIENT
Start: 2017-12-22 | End: 2018-04-03 | Stop reason: SDUPTHER

## 2017-12-22 RX ORDER — COLCHICINE 0.6 MG/1
0.6 TABLET ORAL DAILY
Qty: 30 TABLET | Refills: 1 | Status: SHIPPED | OUTPATIENT
Start: 2017-12-22 | End: 2018-02-17 | Stop reason: SDUPTHER

## 2017-12-22 NOTE — TELEPHONE ENCOUNTER
PC from patient requesting refill on Amlodipine and Colchicine, Medication Pended. Phone Number and Pharmacy Confirmed. Pt last seen on 12/12/17, Next Appt is 4/3/18. Health Maintenance   Topic Date Due    Hepatitis C screen  1960    Diabetic foot exam  06/27/1970    Diabetic retinal exam  06/27/1970    HIV screen  06/27/1975    DTaP/Tdap/Td vaccine (1 - Tdap) 06/27/1979    Lipid screen  07/12/2017    Breast cancer screen  08/04/2018 (Originally 6/27/2010)    Cervical cancer screen  09/20/2018 (Originally 6/27/1981)    Flu vaccine (1) 09/20/2018 (Originally 9/1/2017)    Pneumococcal med risk (1 of 1 - PPSV23) 09/20/2018 (Originally 6/27/1979)    Diabetic hemoglobin A1C test  05/26/2018    Diabetic microalbuminuria test  08/18/2018    Colon cancer screen colonoscopy  12/29/2024       Hemoglobin A1C (%)   Date Value   05/26/2017 6.2   11/21/2016 5.6   02/27/2015 5.9             ( goal A1C is < 7)   Microalb/Crt.  Ratio (mcg/mg creat)   Date Value   08/18/2017 21     LDL Cholesterol (mg/dL)   Date Value   07/12/2016 133 (H)       (goal LDL is <100)   AST (U/L)   Date Value   10/06/2017 13     ALT (U/L)   Date Value   10/06/2017 10     BUN (mg/dL)   Date Value   10/06/2017 13     BP Readings from Last 3 Encounters:   12/12/17 132/84   09/20/17 114/78   09/05/17 (!) 142/90          (goal 120/80)    All Future Testing planned in [facility]  Lab Frequency Next Occurrence   XR LUMBAR SPINE STANDARD Once 09/05/2017   Lipid Panel Once 53/27/5219   Basic Metabolic Panel Once 43/64/7482   Hemoglobin A1C Once 12/29/2017   CT HIP RIGHT WO CONTRAST Once 02/28/2018   Hemoglobin A1C Once 03/12/2018   Lipid Panel Once 03/12/2018   KARO DIGITAL SCREEN W OR WO CAD BILATERAL Once 03/12/2018       Next Visit Date:  Future Appointments  Date Time Provider Opal Bellamy   12/22/2017 2:15 PM Faviola Raymond, PTA [facility]   12/28/2017 12:45 PM Alex Arora PTA [facility]   1/2/2018 12:45 PM  Prom Sebastián Cosby, PTA [facility]  [place]   4/3/2018 10:15 AM Alireza Robertson MD [id number]            Patient Active Problem List:     Osteoarthritis of both knees     Osteoarthritis of both shoulders     DJD (degenerative joint disease), lumbosacral     Essential hypertension     Hyperlipidemia     Depression     Obesity     Postlaminectomy syndrome, lumbar region     Shoulder bursitis     S/P lumbar spinal fusion     Encounter for medication monitoring     Chronic pain syndrome     Posterior dislocation of hip ([address]     History of total right hip replacement     Frequent PVCs     Acute cystitis without hematuria     Primary osteoarthritis of both hips     Right hip pain     History of total right hip arthroplasty     Hip instability     Instability of prosthetic hip ([address]

## 2017-12-27 NOTE — DISCHARGE SUMMARY
Pt has 2 or more no shows/cancels, is discontinued per our policy. [] Pt has completed prescribed number of treatment sessions. [x] Other: Discharge per pt request.  Pt called PT office 12/22/17. States she was upset with care from some staff at medical offices. Completed 6/16 PT sessions. Short term goals not reassessed at that time. Pt has Home exercises program to continue. Electronically signed by Sera Modi PTA on 12/27/2017 at 10:05 AM      If you have any questions or concerns, please don't hesitate to call.   Thank you for your referral.

## 2017-12-27 NOTE — FLOWSHEET NOTE
[] Beatrice Kumar  Outpatient Physical Therapy  [address].  Phone: [phone]  Fax: [phone] [] [facility] at [address] [place] [address] Rd  Phone: [phone]  Fax: [phone] [] [facility]. [address] [place] [address]  Phone: [phone]  Fax: [phone]          Stephanie Jacobson   1960   [id number]    12/27/2017   Pt called 12/22/17 stated she want to cx the three PT  appts  she made on  12/22,12/28, 1/2/18. States  she needs to step away from [facility] due to some issues with care from Dr. Maria Teresa Frost and issues related to her hip dislocation. She did not go into details.          [address], PTA

## 2018-01-18 ENCOUNTER — HOSPITAL ENCOUNTER (OUTPATIENT)
Dept: MAMMOGRAPHY | Age: 58
Discharge: HOME | End: 2018-01-18
Payer: COMMERCIAL

## 2018-01-18 ENCOUNTER — HOSPITAL ENCOUNTER (OUTPATIENT)
Dept: CT IMAGING | Age: 58
Discharge: HOME | End: 2018-01-18
Payer: COMMERCIAL

## 2018-01-18 DIAGNOSIS — T84.028D INSTABILITY OF PROSTHETIC HIP, SUBSEQUENT ENCOUNTER: ICD-10-CM

## 2018-01-18 DIAGNOSIS — Z12.39 SCREENING FOR BREAST CANCER: ICD-10-CM

## 2018-01-18 DIAGNOSIS — Z96.649 INSTABILITY OF PROSTHETIC HIP, SUBSEQUENT ENCOUNTER: ICD-10-CM

## 2018-01-18 PROCEDURE — 77067 SCR MAMMO BI INCL CAD: CPT

## 2018-01-18 PROCEDURE — 73700 CT LOWER EXTREMITY W/O DYE: CPT

## 2018-01-22 RX ORDER — OMEPRAZOLE 20 MG/1
CAPSULE, DELAYED RELEASE ORAL
Qty: 30 CAPSULE | Refills: 3 | Status: SHIPPED | OUTPATIENT
Start: 2018-01-22 | End: 2018-04-03 | Stop reason: SDUPTHER

## 2018-02-19 RX ORDER — COLCHICINE 0.6 MG/1
TABLET ORAL
Qty: 30 TABLET | Refills: 1 | Status: SHIPPED | OUTPATIENT
Start: 2018-02-19 | End: 2018-04-03 | Stop reason: SDUPTHER

## 2018-03-16 ENCOUNTER — APPOINTMENT (OUTPATIENT)
Dept: GENERAL RADIOLOGY | Age: 58
End: 2018-03-16
Payer: COMMERCIAL

## 2018-03-16 ENCOUNTER — HOSPITAL ENCOUNTER (EMERGENCY)
Age: 58
Discharge: HOME | End: 2018-03-16
Payer: COMMERCIAL

## 2018-03-16 VITALS
HEART RATE: 96 BPM | RESPIRATION RATE: 16 BRPM | TEMPERATURE: 99.1 F | SYSTOLIC BLOOD PRESSURE: 141 MMHG | WEIGHT: 179 LBS | BODY MASS INDEX: 28.77 KG/M2 | HEIGHT: 66 IN | OXYGEN SATURATION: 100 % | DIASTOLIC BLOOD PRESSURE: 92 MMHG

## 2018-03-16 DIAGNOSIS — M25.552 LEFT HIP PAIN: Primary | ICD-10-CM

## 2018-03-16 DIAGNOSIS — R93.7 ABNORMAL BONE XRAY: ICD-10-CM

## 2018-03-16 PROCEDURE — 99283 EMERGENCY DEPT VISIT LOW MDM: CPT

## 2018-03-16 PROCEDURE — 73502 X-RAY EXAM HIP UNI 2-3 VIEWS: CPT

## 2018-03-16 PROCEDURE — 96372 THER/PROPH/DIAG INJ SC/IM: CPT

## 2018-03-16 PROCEDURE — 6360000002 HC RX W HCPCS: Performed by: NURSE PRACTITIONER

## 2018-03-16 PROCEDURE — 72100 X-RAY EXAM L-S SPINE 2/3 VWS: CPT

## 2018-03-16 RX ORDER — MORPHINE SULFATE 2 MG/ML
2 INJECTION, SOLUTION INTRAMUSCULAR; INTRAVENOUS ONCE
Status: COMPLETED | OUTPATIENT
Start: 2018-03-16 | End: 2018-03-16

## 2018-03-16 RX ORDER — OXYCODONE HYDROCHLORIDE AND ACETAMINOPHEN 5; 325 MG/1; MG/1
1 TABLET ORAL EVERY 8 HOURS PRN
Qty: 20 TABLET | Refills: 0 | Status: SHIPPED | OUTPATIENT
Start: 2018-03-16 | End: 2018-03-23

## 2018-03-16 RX ADMIN — Medication 2 MG: at 17:05

## 2018-03-16 ASSESSMENT — PAIN DESCRIPTION - PAIN TYPE: TYPE: CHRONIC PAIN

## 2018-03-16 ASSESSMENT — PAIN SCALES - GENERAL
PAINLEVEL_OUTOF10: 8
PAINLEVEL_OUTOF10: 8

## 2018-03-16 ASSESSMENT — PAIN DESCRIPTION - ORIENTATION: ORIENTATION: LEFT

## 2018-03-16 ASSESSMENT — PAIN DESCRIPTION - LOCATION: LOCATION: HIP

## 2018-03-16 ASSESSMENT — PAIN DESCRIPTION - DESCRIPTORS: DESCRIPTORS: CONSTANT

## 2018-03-19 ASSESSMENT — ENCOUNTER SYMPTOMS
COUGH: 0
ABDOMINAL DISTENTION: 1
SHORTNESS OF BREATH: 0
PHOTOPHOBIA: 0
BACK PAIN: 1

## 2018-03-19 NOTE — ED PROVIDER NOTES
[address] [place] [address] ED  eMERGENCY dEPARTMENT eNCOUnter      Pt Name: Jackeline Nixon  MRN: [id number]  [place] 1960  Date of evaluation: 3/16/2018  Provider: Yoselyn De Los Santos MD    CHIEF COMPLAINT       Chief Complaint   Patient presents with    Hip Pain         HISTORY OF PRESENT ILLNESS  (Location/Symptom, Timing/Onset, Context/Setting, Quality, Duration, Modifying Factors, Severity.)   Jackeline Nixon is a 62 y.o. female who presents to the emergency department Presents with hip pain. States pains an 8 on a scale of 1-10 exacerbated by movement she has had a posterior dislocation of the hip requiring admission and reduction also has chronic arthritis. History of gout      Nursing Notes were reviewed. ALLERGIES     Asa [aspirin]; Ibuprofen; Neurontin [gabapentin]; Shellfish-derived products; Shrimp flavor; and Tape [adhesive tape]    CURRENT MEDICATIONS       Discharge Medication List as of 3/16/2018  6:10 PM      CONTINUE these medications which have NOT CHANGED    Details   colchicine (COLCRYS) 0.6 MG tablet take 1 tablet by mouth once daily, Disp-30 tablet, R-1Normal      omeprazole (PRILOSEC) 20 MG delayed release capsule take 1 capsule by mouth once daily 30-60 MINUTES BEFORE FIRST MEAL OF DAY, Disp-30 capsule, R-3Normal      amLODIPine (NORVASC) 10 MG tablet Take 1 tablet by mouth daily, Disp-30 tablet, R-6Normal      HYDROcodone-acetaminophen (NORCO) 5-325 MG per tablet Take 1 tablet by mouth daily as needed for Pain  . Will cause drowsiness. Do not drive if taking. ., Disp-15 tablet, R-0Print      nicotine polacrilex (NICORETTE) 2 MG gum Take 1 each by mouth as needed for Smoking cessation, Disp-110 each, R-3Normal      acetaminophen (TYLENOL) 325 MG tablet Take 650 mg by mouth as needed for PainHistorical Med             PAST MEDICAL HISTORY         Diagnosis Date    CAD (coronary artery disease)     pt denies, CARDIAC CATH O.K., STRESS O.K.    Depression 7/30/2013    Fibromyalgia     Full dentures     Hyperlipidemia 2013    Hypertension     Obesity 2013    Osteoarthritis     Pain management     sees [facility]    Wears glasses        SURGICAL HISTORY           Procedure Laterality Date    COLONOSCOPY      polyps removed   [place]  14    caudal #1  celestone 9mg    NERVE BLOCK  14    caudal #2  celestone 9mg    NERVE BLOCK  14    caudal# 3, celestone 9 mg    OTHER SURGICAL HISTORY      SPINAL ENDOSCOPY X3    SHOULDER ARTHROSCOPY Left 06/13/15    SHOULDER SURGERY  Right;     SPINE SURGERY  2009    TOTAL HIP ARTHROPLASTY Right 10/12/2016         FAMILY HISTORY           Problem Relation Age of Onset   [facility] COPD Mother     Coronary Art Dis Father     Cancer Sister     Coronary Art Dis Sister     Cirrhosis Sister     Arthritis Brother     Asthma Brother     Arthritis Sister     Asthma Sister     Cancer Sister     Asthma Sister     Cancer Sister     Asthma Sister     Asthma Brother     Arthritis Brother      Family Status   Relation Status    Mother    [facility] Father     Sister     Brother     Sister     Sister     Sister    [facility] Brother    [facility] Brother    [facility] Brother     Sister     Sister     Sister     Sister     Brother     Brother         SOCIAL HISTORY      reports that she has quit smoking. Her smoking use included Cigarettes and E-Cigarettes. She has a 6.25 pack-year smoking history. She has never used smokeless tobacco. She reports that she drinks alcohol. She reports that she does not use drugs. REVIEW OF SYSTEMS    (2-9 systems for level 4, 10 or more for level 5)     Review of Systems   Constitutional: Positive for activity change and appetite change. HENT: Positive for congestion. Eyes: Negative for photophobia and visual disturbance. Respiratory: Negative for cough and shortness of breath. Cardiovascular: Positive for leg swelling. Negative for chest pain and palpitations. Gastrointestinal: Positive for abdominal distention. Endocrine: Negative for polydipsia and polyphagia. Genitourinary: Negative for dysuria and flank pain. Musculoskeletal: Positive for arthralgias, back pain, gait problem and myalgias. Neurological: Positive for weakness and numbness. Negative for dizziness and headaches. Psychiatric/Behavioral: Negative for agitation and behavioral problems. Except as noted above the remainder of the review of systems was reviewed and negative. PHYSICAL EXAM    (up to 7 for level 4, 8 or more for level 5)     ED Triage Vitals [03/16/18 1620]   BP Temp Temp Source Pulse Resp SpO2 Height Weight   (!) 141/92 99.1 °F (37.3 °C) Oral 96 16 100 % 5' 6\" (1.676 m) 179 lb (81.2 kg)       Physical Exam   Constitutional: She is oriented to person, place, and time. She appears well-developed and well-nourished. HENT:   Head: Normocephalic and atraumatic. Eyes: Conjunctivae and EOM are normal. Pupils are equal, round, and reactive to light. Right eye exhibits no discharge. Neck: Normal range of motion. Neck supple. Cardiovascular: Normal rate and regular rhythm. Pulmonary/Chest: Effort normal and breath sounds normal.   Abdominal: Soft. Bowel sounds are normal.   Musculoskeletal: She exhibits edema and tenderness. She exhibits no deformity. Neurological: She is alert and oriented to person, place, and time. Skin: Skin is warm and dry. Psychiatric: She has a normal mood and affect. Her behavior is normal.   Nursing note and vitals reviewed.           DIAGNOSTIC RESULTS     EKG: All EKG's are interpreted by the Emergency Department Physician who either signs or Co-signs this chart in the absence of a cardiologist.        RADIOLOGY:   Non-plain film images such as CT, Ultrasound and MRI are read by the radiologist. Plain radiographic images are visualized and preliminarily interpreted by the emergency physician with the below findings:      Xr Lumbar Spine (2-3 Views)    Result Date: 3/16/2018  EXAMINATION: 3 VIEWS OF THE LUMBAR SPINE 3/16/2018 5:14 pm COMPARISON: October 21, 2014 HISTORY: ORDERING SYSTEM PROVIDED HISTORY: pain TECHNOLOGIST PROVIDED HISTORY: Reason for exam:->pain Ordering Physician Provided Reason for Exam: Patient states lower back pain and left hip pain x 3 days, no known injury, lower back surgery in 2008 Acuity: Acute Type of Exam: Initial FINDINGS: Mild dextroconvex scoliosis. Posterior fusion rods and bilateral pedicular screws at L4-5 with a disc spacer. Hardware appears intact. Vertebral bodies are normal in height and alignment. There is moderate osteophytosis and mild disc space narrowing at all levels. Posterior interbody fusion L4-5. Moderate spondylosis. No acute disease. Xr Hip Left (2-3 Views)    Result Date: 3/16/2018  EXAMINATION: 2 VIEWS OF THE LEFT HIP 3/16/2018 5:06 pm COMPARISON: August 30, 2017 HISTORY: ORDERING SYSTEM PROVIDED HISTORY: Pain TECHNOLOGIST PROVIDED HISTORY: Reason for exam:->Pain Ordering Physician Provided Reason for Exam: Patient states lower back pain and left hip pain x 3 days, no known injury, lower back surgery in 2008 Acuity: Acute Type of Exam: Initial FINDINGS: There appear to be cystic changes within the femoral head. There is mild narrowing and subchondral sclerosis of the joint. Cortical margins are intact. Alignment is anatomic. Degenerative and possible cystic changes. No evidence of avascular necrosis. MRI may be more sensitive if clinically warranted. Interpretation per the Radiologist below, if available at the time of this note:    XR LUMBAR SPINE (2-3 VIEWS)   Final Result   Posterior interbody fusion L4-5. Moderate spondylosis. No acute disease. XR HIP LEFT (2-3 VIEWS)   Final Result   Degenerative and possible cystic changes. No evidence of avascular necrosis.    MRI may be

## 2018-03-20 ENCOUNTER — TELEPHONE (OUTPATIENT)
Dept: INTERNAL MEDICINE | Age: 58
End: 2018-03-20

## 2018-04-03 ENCOUNTER — OFFICE VISIT (OUTPATIENT)
Dept: INTERNAL MEDICINE | Age: 58
End: 2018-04-03
Payer: COMMERCIAL

## 2018-04-03 VITALS
HEART RATE: 84 BPM | WEIGHT: 186.8 LBS | BODY MASS INDEX: 30.02 KG/M2 | SYSTOLIC BLOOD PRESSURE: 124 MMHG | HEIGHT: 66 IN | DIASTOLIC BLOOD PRESSURE: 94 MMHG

## 2018-04-03 DIAGNOSIS — I10 ESSENTIAL HYPERTENSION: ICD-10-CM

## 2018-04-03 DIAGNOSIS — M16.0 PRIMARY OSTEOARTHRITIS OF BOTH HIPS: Primary | Chronic | ICD-10-CM

## 2018-04-03 PROCEDURE — 1036F TOBACCO NON-USER: CPT | Performed by: INTERNAL MEDICINE

## 2018-04-03 PROCEDURE — G8427 DOCREV CUR MEDS BY ELIG CLIN: HCPCS | Performed by: INTERNAL MEDICINE

## 2018-04-03 PROCEDURE — 3014F SCREEN MAMMO DOC REV: CPT | Performed by: INTERNAL MEDICINE

## 2018-04-03 PROCEDURE — 3017F COLORECTAL CA SCREEN DOC REV: CPT | Performed by: INTERNAL MEDICINE

## 2018-04-03 PROCEDURE — 99213 OFFICE O/P EST LOW 20 MIN: CPT | Performed by: INTERNAL MEDICINE

## 2018-04-03 PROCEDURE — G8417 CALC BMI ABV UP PARAM F/U: HCPCS | Performed by: INTERNAL MEDICINE

## 2018-04-03 PROCEDURE — 99211 OFF/OP EST MAY X REQ PHY/QHP: CPT

## 2018-04-03 RX ORDER — AMLODIPINE BESYLATE 10 MG/1
10 TABLET ORAL DAILY
Qty: 30 TABLET | Refills: 6 | Status: SHIPPED | OUTPATIENT
Start: 2018-04-03 | End: 2018-06-21 | Stop reason: SDUPTHER

## 2018-04-03 RX ORDER — OMEPRAZOLE 20 MG/1
CAPSULE, DELAYED RELEASE ORAL
Qty: 30 CAPSULE | Refills: 3 | Status: SHIPPED | OUTPATIENT
Start: 2018-04-03 | End: 2018-09-04 | Stop reason: SDUPTHER

## 2018-04-03 RX ORDER — COLCHICINE 0.6 MG/1
0.6 TABLET ORAL DAILY
Qty: 30 TABLET | Refills: 1 | Status: SHIPPED | OUTPATIENT
Start: 2018-04-03 | End: 2018-06-13 | Stop reason: SDUPTHER

## 2018-04-03 RX ORDER — ACETAMINOPHEN 325 MG/1
650 TABLET ORAL PRN
Qty: 90 TABLET | Refills: 1 | Status: SHIPPED | OUTPATIENT
Start: 2018-04-03 | End: 2020-02-11 | Stop reason: SDUPTHER

## 2018-04-04 ENCOUNTER — INITIAL CONSULT (OUTPATIENT)
Dept: PAIN MANAGEMENT | Age: 58
End: 2018-04-04
Payer: COMMERCIAL

## 2018-04-04 VITALS
BODY MASS INDEX: 29.41 KG/M2 | RESPIRATION RATE: 16 BRPM | DIASTOLIC BLOOD PRESSURE: 83 MMHG | SYSTOLIC BLOOD PRESSURE: 129 MMHG | WEIGHT: 183 LBS | OXYGEN SATURATION: 99 % | HEART RATE: 85 BPM | HEIGHT: 66 IN

## 2018-04-04 DIAGNOSIS — M16.12 ARTHRITIS OF LEFT HIP: ICD-10-CM

## 2018-04-04 DIAGNOSIS — M96.1 FAILED BACK SYNDROME: Primary | ICD-10-CM

## 2018-04-04 DIAGNOSIS — Z96.641 HISTORY OF TOTAL RIGHT HIP REPLACEMENT: ICD-10-CM

## 2018-04-04 PROCEDURE — 3017F COLORECTAL CA SCREEN DOC REV: CPT | Performed by: ANESTHESIOLOGY

## 2018-04-04 PROCEDURE — G8427 DOCREV CUR MEDS BY ELIG CLIN: HCPCS | Performed by: ANESTHESIOLOGY

## 2018-04-04 PROCEDURE — G8417 CALC BMI ABV UP PARAM F/U: HCPCS | Performed by: ANESTHESIOLOGY

## 2018-04-04 PROCEDURE — 3014F SCREEN MAMMO DOC REV: CPT | Performed by: ANESTHESIOLOGY

## 2018-04-04 PROCEDURE — 1036F TOBACCO NON-USER: CPT | Performed by: ANESTHESIOLOGY

## 2018-04-04 PROCEDURE — 99215 OFFICE O/P EST HI 40 MIN: CPT | Performed by: ANESTHESIOLOGY

## 2018-04-04 RX ORDER — M-VIT,TX,IRON,MINS/CALC/FOLIC 27MG-0.4MG
1 TABLET ORAL DAILY
COMMUNITY
End: 2020-09-04 | Stop reason: SDUPTHER

## 2018-04-04 ASSESSMENT — ENCOUNTER SYMPTOMS
ANAL BLEEDING: 1
NAUSEA: 1
ABDOMINAL PAIN: 1
RESPIRATORY NEGATIVE: 1
EYES NEGATIVE: 1
BACK PAIN: 1

## 2018-04-30 ENCOUNTER — TELEPHONE (OUTPATIENT)
Dept: INTERNAL MEDICINE | Age: 58
End: 2018-04-30

## 2018-06-07 ENCOUNTER — TELEPHONE (OUTPATIENT)
Dept: INTERNAL MEDICINE | Age: 58
End: 2018-06-07

## 2018-06-13 RX ORDER — COLCHICINE 0.6 MG/1
TABLET ORAL
Qty: 30 TABLET | Refills: 1 | Status: SHIPPED | OUTPATIENT
Start: 2018-06-13 | End: 2018-08-08 | Stop reason: SDUPTHER

## 2018-06-21 ENCOUNTER — OFFICE VISIT (OUTPATIENT)
Dept: INTERNAL MEDICINE | Age: 58
End: 2018-06-21
Payer: COMMERCIAL

## 2018-06-21 VITALS — HEIGHT: 66 IN | HEART RATE: 84 BPM | SYSTOLIC BLOOD PRESSURE: 117 MMHG | DIASTOLIC BLOOD PRESSURE: 80 MMHG

## 2018-06-21 DIAGNOSIS — E79.0 HYPERURICEMIA: ICD-10-CM

## 2018-06-21 DIAGNOSIS — M96.1 POSTLAMINECTOMY SYNDROME, LUMBAR REGION: Chronic | ICD-10-CM

## 2018-06-21 DIAGNOSIS — E78.00 PURE HYPERCHOLESTEROLEMIA: ICD-10-CM

## 2018-06-21 DIAGNOSIS — I10 ESSENTIAL HYPERTENSION: Primary | ICD-10-CM

## 2018-06-21 DIAGNOSIS — R73.02 IGT (IMPAIRED GLUCOSE TOLERANCE): ICD-10-CM

## 2018-06-21 PROCEDURE — 99213 OFFICE O/P EST LOW 20 MIN: CPT | Performed by: INTERNAL MEDICINE

## 2018-06-21 PROCEDURE — 4004F PT TOBACCO SCREEN RCVD TLK: CPT | Performed by: INTERNAL MEDICINE

## 2018-06-21 PROCEDURE — 3017F COLORECTAL CA SCREEN DOC REV: CPT | Performed by: INTERNAL MEDICINE

## 2018-06-21 PROCEDURE — G0444 DEPRESSION SCREEN ANNUAL: HCPCS | Performed by: INTERNAL MEDICINE

## 2018-06-21 PROCEDURE — G8427 DOCREV CUR MEDS BY ELIG CLIN: HCPCS | Performed by: INTERNAL MEDICINE

## 2018-06-21 PROCEDURE — G8417 CALC BMI ABV UP PARAM F/U: HCPCS | Performed by: INTERNAL MEDICINE

## 2018-06-21 RX ORDER — AMLODIPINE BESYLATE 10 MG/1
10 TABLET ORAL DAILY
Qty: 30 TABLET | Refills: 6 | Status: SHIPPED | OUTPATIENT
Start: 2018-06-21 | End: 2018-11-06 | Stop reason: SDUPTHER

## 2018-06-21 ASSESSMENT — ENCOUNTER SYMPTOMS
BACK PAIN: 1
EYE REDNESS: 0
CONSTIPATION: 0
ABDOMINAL PAIN: 0
SHORTNESS OF BREATH: 0
BLURRED VISION: 0
NAUSEA: 0
SPUTUM PRODUCTION: 0
COUGH: 0

## 2018-06-21 ASSESSMENT — PATIENT HEALTH QUESTIONNAIRE - PHQ9
6. FEELING BAD ABOUT YOURSELF - OR THAT YOU ARE A FAILURE OR HAVE LET YOURSELF OR YOUR FAMILY DOWN: 0
2. FEELING DOWN, DEPRESSED OR HOPELESS: 0
10. IF YOU CHECKED OFF ANY PROBLEMS, HOW DIFFICULT HAVE THESE PROBLEMS MADE IT FOR YOU TO DO YOUR WORK, TAKE CARE OF THINGS AT HOME, OR GET ALONG WITH OTHER PEOPLE: 1
3. TROUBLE FALLING OR STAYING ASLEEP: 1
9. THOUGHTS THAT YOU WOULD BE BETTER OFF DEAD, OR OF HURTING YOURSELF: 0
1. LITTLE INTEREST OR PLEASURE IN DOING THINGS: 0
5. POOR APPETITE OR OVEREATING: 1
SUM OF ALL RESPONSES TO PHQ9 QUESTIONS 1 & 2: 0
7. TROUBLE CONCENTRATING ON THINGS, SUCH AS READING THE NEWSPAPER OR WATCHING TELEVISION: 0
8. MOVING OR SPEAKING SO SLOWLY THAT OTHER PEOPLE COULD HAVE NOTICED. OR THE OPPOSITE, BEING SO FIGETY OR RESTLESS THAT YOU HAVE BEEN MOVING AROUND A LOT MORE THAN USUAL: 0
SUM OF ALL RESPONSES TO PHQ QUESTIONS 1-9: 3
4. FEELING TIRED OR HAVING LITTLE ENERGY: 1

## 2018-08-08 RX ORDER — COLCHICINE 0.6 MG/1
TABLET ORAL
Qty: 30 TABLET | Refills: 1 | Status: SHIPPED | OUTPATIENT
Start: 2018-08-08 | End: 2018-10-05 | Stop reason: SDUPTHER

## 2018-09-04 RX ORDER — OMEPRAZOLE 20 MG/1
CAPSULE, DELAYED RELEASE ORAL
Qty: 30 CAPSULE | Refills: 3 | Status: SHIPPED | OUTPATIENT
Start: 2018-09-04 | End: 2018-12-28 | Stop reason: SDUPTHER

## 2018-09-04 NOTE — TELEPHONE ENCOUNTER
E-scribe request for OMEPRAZOLE DR 20 MG CAPSULE. Please review and e-scribe if applicable. Last Visit Date:  6/21/18  Next Visit Date:  10/1/2018    Hemoglobin A1C (%)   Date Value   05/26/2017 6.2   11/21/2016 5.6   02/27/2015 5.9             ( goal A1C is < 7)   Microalb/Crt.  Ratio (mcg/mg creat)   Date Value   08/18/2017 21     LDL Cholesterol (mg/dL)   Date Value   07/12/2016 133 (H)       (goal LDL is <100)   AST (U/L)   Date Value   10/06/2017 13     ALT (U/L)   Date Value   10/06/2017 10     BUN (mg/dL)   Date Value   10/06/2017 13     BP Readings from Last 3 Encounters:   06/21/18 117/80   04/04/18 129/83   04/03/18 (!) 124/94          (goal 120/80)        Patient Active Problem List:     Osteoarthritis of both knees     Osteoarthritis of both shoulders     DJD (degenerative joint disease), lumbosacral     Essential hypertension     Hyperlipidemia     Depression     Obesity     Postlaminectomy syndrome, lumbar region     Shoulder bursitis     S/P lumbar spinal fusion     Encounter for medication monitoring     Chronic pain syndrome     Posterior dislocation of hip ([address]     History of total right hip replacement     Frequent PVCs     Acute cystitis without hematuria     Primary osteoarthritis of both hips     Right hip pain     History of total right hip arthroplasty     Hip instability     Instability of prosthetic hip ([address]      ----JF

## 2018-10-05 RX ORDER — COLCHICINE 0.6 MG/1
TABLET ORAL
Qty: 30 TABLET | Refills: 1 | Status: SHIPPED | OUTPATIENT
Start: 2018-10-05 | End: 2018-12-01 | Stop reason: SDUPTHER

## 2018-10-05 NOTE — TELEPHONE ENCOUNTER
Hyperlipidemia     Depression     Obesity     Postlaminectomy syndrome, lumbar region     Shoulder bursitis     S/P lumbar spinal fusion     Encounter for medication monitoring     Chronic pain syndrome     Posterior dislocation of hip ([address]     History of total right hip replacement     Frequent PVCs     Acute cystitis without hematuria     Primary osteoarthritis of both hips     Right hip pain     History of total right hip arthroplasty     Hip instability     Instability of prosthetic hip ([address]

## 2018-10-15 ENCOUNTER — TELEPHONE (OUTPATIENT)
Dept: INTERNAL MEDICINE | Age: 58
End: 2018-10-15

## 2018-11-06 ENCOUNTER — OFFICE VISIT (OUTPATIENT)
Dept: INTERNAL MEDICINE | Age: 58
End: 2018-11-06
Payer: COMMERCIAL

## 2018-11-06 VITALS
WEIGHT: 172 LBS | SYSTOLIC BLOOD PRESSURE: 142 MMHG | DIASTOLIC BLOOD PRESSURE: 90 MMHG | BODY MASS INDEX: 27.76 KG/M2 | HEART RATE: 64 BPM

## 2018-11-06 DIAGNOSIS — R73.02 IGT (IMPAIRED GLUCOSE TOLERANCE): ICD-10-CM

## 2018-11-06 DIAGNOSIS — E78.00 PURE HYPERCHOLESTEROLEMIA: ICD-10-CM

## 2018-11-06 DIAGNOSIS — J31.0 CHRONIC RHINITIS: ICD-10-CM

## 2018-11-06 DIAGNOSIS — E79.0 HYPERURICEMIA: ICD-10-CM

## 2018-11-06 DIAGNOSIS — F17.200 SMOKER: ICD-10-CM

## 2018-11-06 DIAGNOSIS — M96.1 POSTLAMINECTOMY SYNDROME, LUMBAR REGION: Chronic | ICD-10-CM

## 2018-11-06 DIAGNOSIS — I10 ESSENTIAL HYPERTENSION: Primary | ICD-10-CM

## 2018-11-06 DIAGNOSIS — M16.0 PRIMARY OSTEOARTHRITIS OF BOTH HIPS: Chronic | ICD-10-CM

## 2018-11-06 LAB — HBA1C MFR BLD: 6 %

## 2018-11-06 PROCEDURE — G8427 DOCREV CUR MEDS BY ELIG CLIN: HCPCS | Performed by: INTERNAL MEDICINE

## 2018-11-06 PROCEDURE — 3017F COLORECTAL CA SCREEN DOC REV: CPT | Performed by: INTERNAL MEDICINE

## 2018-11-06 PROCEDURE — 99211 OFF/OP EST MAY X REQ PHY/QHP: CPT | Performed by: INTERNAL MEDICINE

## 2018-11-06 PROCEDURE — 83036 HEMOGLOBIN GLYCOSYLATED A1C: CPT | Performed by: INTERNAL MEDICINE

## 2018-11-06 PROCEDURE — G8417 CALC BMI ABV UP PARAM F/U: HCPCS | Performed by: INTERNAL MEDICINE

## 2018-11-06 PROCEDURE — 99213 OFFICE O/P EST LOW 20 MIN: CPT | Performed by: INTERNAL MEDICINE

## 2018-11-06 PROCEDURE — 4004F PT TOBACCO SCREEN RCVD TLK: CPT | Performed by: INTERNAL MEDICINE

## 2018-11-06 PROCEDURE — G8484 FLU IMMUNIZE NO ADMIN: HCPCS | Performed by: INTERNAL MEDICINE

## 2018-11-06 RX ORDER — AMLODIPINE BESYLATE 10 MG/1
10 TABLET ORAL DAILY
Qty: 30 TABLET | Refills: 6 | Status: SHIPPED | OUTPATIENT
Start: 2018-11-06 | End: 2019-06-04 | Stop reason: SDUPTHER

## 2018-11-06 RX ORDER — FLUTICASONE PROPIONATE 50 MCG
1 SPRAY, SUSPENSION (ML) NASAL DAILY
Qty: 1 BOTTLE | Refills: 3 | Status: SHIPPED | OUTPATIENT
Start: 2018-11-06 | End: 2019-06-04

## 2018-11-06 NOTE — PROGRESS NOTES
Negative for adenopathy. Does not bruise/bleed easily. Psychiatric/Behavioral: Negative for dysphoric mood. The patient is nervous/anxious. PHYSICAL EXAM:     Vitals:    11/06/18 1140 11/06/18 1227   BP: (!) 151/94 (!) 142/90   Site: Left Upper Arm Left Upper Arm   Position: Sitting Sitting   Cuff Size: Medium Adult Large Adult   Pulse: 64    Weight: 172 lb (78 kg)      Body mass index is 27.76 kg/m². BP Readings from Last 3 Encounters:   11/06/18 (!) 151/94   06/21/18 117/80   04/04/18 129/83        Wt Readings from Last 3 Encounters:   11/06/18 172 lb (78 kg)   04/04/18 183 lb (83 kg)   04/03/18 186 lb 12.8 oz (84.7 kg)       Physical Exam      HENT:  Normocephalic, Atraumatic  Eyes:  PERRL, EOMI, Conjunctiva normal, No discharge. Respiratory:  Normal breath sounds, No respiratory distress, No wheezing, No chest tenderness. Cardiovascular:  Normal heart rate, Normal rhythm, No murmurs  GI:  Bowel sounds normal, Soft, No tenderness, No masses, No pulsatile masses. :   No CVA tenderness. Musculoskeletal:  Intact distal pulses, No edema, No tenderness  Integument:  Warm, Dry, No erythema, No rash. Lymphatic:  No lymphadenopathy noted. Neurologic:  Alert & oriented x 3, Normal motor function, Normal sensory function, No focal deficits noted.    Psychiatric:  Affect anxious    LABORATORY FINDINGS:    CBC:  Lab Results   Component Value Date    WBC 6.0 10/06/2017    HGB 11.7 10/06/2017     10/06/2017     BMP:    Lab Results   Component Value Date     10/06/2017    K 4.1 10/06/2017     10/06/2017    CO2 27 10/06/2017    BUN 13 10/06/2017    CREATININE 0.83 10/06/2017    GLUCOSE 102 10/06/2017     HEMOGLOBIN A1C:   Lab Results   Component Value Date    LABA1C 6.2 05/26/2017     MICROALBUMIN URINE:   Lab Results   Component Value Date    MICROALBUR 31 08/18/2017     FASTING LIPID [email or web address]  Lab Results   Component Value Date

## 2018-11-11 ASSESSMENT — ENCOUNTER SYMPTOMS
WHEEZING: 0
CONSTIPATION: 0
ABDOMINAL PAIN: 0
EYE REDNESS: 0
SORE THROAT: 0
COUGH: 0
NAUSEA: 0
BACK PAIN: 1
SHORTNESS OF BREATH: 0

## 2018-12-05 RX ORDER — COLCHICINE 0.6 MG/1
TABLET ORAL
Qty: 30 TABLET | Refills: 1 | Status: SHIPPED | OUTPATIENT
Start: 2018-12-05 | End: 2019-01-25 | Stop reason: SDUPTHER

## 2018-12-28 RX ORDER — OMEPRAZOLE 20 MG/1
CAPSULE, DELAYED RELEASE ORAL
Qty: 30 CAPSULE | Refills: 3 | Status: SHIPPED | OUTPATIENT
Start: 2018-12-28 | End: 2019-04-23 | Stop reason: SDUPTHER

## 2019-03-29 RX ORDER — COLCHICINE 0.6 MG/1
TABLET ORAL
Qty: 30 TABLET | Refills: 1 | Status: SHIPPED | OUTPATIENT
Start: 2019-03-29 | End: 2019-05-24 | Stop reason: SDUPTHER

## 2019-04-23 RX ORDER — OMEPRAZOLE 20 MG/1
CAPSULE, DELAYED RELEASE ORAL
Qty: 30 CAPSULE | Refills: 3 | Status: SHIPPED | OUTPATIENT
Start: 2019-04-23 | End: 2019-09-03 | Stop reason: SDUPTHER

## 2019-04-23 NOTE — TELEPHONE ENCOUNTER
Escribe request for omeprazole  . Please escribe if appropriate      Next Visit Date:  5/9/19     Health Maintenance   Topic Date Due    Hepatitis C screen  1960    Pneumococcal 0-64 years Vaccine (1 of 1 - PPSV23) 06/27/1966    HIV screen  06/27/1975    DTaP/Tdap/Td vaccine (1 - Tdap) 06/27/1979    Shingles Vaccine (1 of 2) 06/27/2010    Potassium monitoring  10/06/2018    Creatinine monitoring  10/06/2018    Flu vaccine (Season Ended) 09/01/2019    A1C test (Diabetic or Prediabetic)  11/06/2019    Breast cancer screen  01/18/2020    Lipid screen  07/12/2021    Colon cancer screen colonoscopy  12/29/2024       Hemoglobin A1C (%)   Date Value   11/06/2018 6.0   05/26/2017 6.2   11/21/2016 5.6             ( goal A1C is < 7)   Microalb/Crt.  Ratio (mcg/mg creat)   Date Value   08/18/2017 21     LDL Cholesterol (mg/dL)   Date Value   07/12/2016 133 (H)       (goal LDL is <100)   AST (U/L)   Date Value   10/06/2017 13     ALT (U/L)   Date Value   10/06/2017 10     BUN (mg/dL)   Date Value   10/06/2017 13     BP Readings from Last 3 Encounters:   11/06/18 (!) 142/90   06/21/18 117/80   04/04/18 129/83          (goal 120/80)          Patient Active Problem List:     Osteoarthritis of both knees     Osteoarthritis of both shoulders     DJD (degenerative joint disease), lumbosacral     Essential hypertension     Hyperlipidemia     Depression     Obesity     Postlaminectomy syndrome, lumbar region     Shoulder bursitis     S/P lumbar spinal fusion     Encounter for medication monitoring     Chronic pain syndrome     Posterior dislocation of hip ([address]     History of total right hip replacement     Frequent PVCs     Acute cystitis without hematuria     Primary osteoarthritis of both hips     Right hip pain     History of total right hip arthroplasty     Hip instability     Instability of prosthetic hip ([address]

## 2019-05-24 RX ORDER — COLCHICINE 0.6 MG/1
TABLET ORAL
Qty: 30 TABLET | Refills: 1 | Status: SHIPPED | OUTPATIENT
Start: 2019-05-24 | End: 2019-08-05 | Stop reason: SDUPTHER

## 2019-05-24 NOTE — TELEPHONE ENCOUNTER
E-scribe request for Colchicine 0.6 MG. Please review and e-scribe if applicable. Next Visit Date:  6/4/2019    Health Maintenance   Topic Date Due    Hepatitis C screen  1960    Pneumococcal 0-64 years Vaccine (1 of 1 - PPSV23) 06/27/1966    HIV screen  06/27/1975    DTaP/Tdap/Td vaccine (1 - Tdap) 06/27/1979    Shingles Vaccine (1 of 2) 06/27/2010    Potassium monitoring  10/06/2018    Creatinine monitoring  10/06/2018    Flu vaccine (Season Ended) 09/01/2019    A1C test (Diabetic or Prediabetic)  11/06/2019    Breast cancer screen  01/18/2020    Lipid screen  07/12/2021    Colon cancer screen colonoscopy  12/29/2024             (applicable per patient's age: Cancer Screenings, Depression Screening, Fall Risk Screening, Immunizations)    Hemoglobin A1C (%)   Date Value   11/06/2018 6.0   05/26/2017 6.2   11/21/2016 5.6     Microalb/Crt.  Ratio (mcg/mg creat)   Date Value   08/18/2017 21     LDL Cholesterol (mg/dL)   Date Value   07/12/2016 133 (H)     AST (U/L)   Date Value   10/06/2017 13     ALT (U/L)   Date Value   10/06/2017 10     BUN (mg/dL)   Date Value   10/06/2017 13      (goal A1C is < 7)   (goal LDL is <100) need 30-50% reduction from baseline     BP Readings from Last 3 Encounters:   11/06/18 (!) 142/90   06/21/18 117/80   04/04/18 129/83    (goal /80)      All Future Testing planned in [facility]:  Lab Frequency Next Occurrence   Lipid Panel Once 06/21/2019   Comprehensive Metabolic Panel Once 25/37/5654   Uric Acid Once 06/21/2019       Next Visit Date:  Future Appointments   Date Time Provider Opal Bellamy   6/4/2019 10:00 AM Lori An MD [email or web address]            Patient Active Problem List:     Osteoarthritis of both knees     Osteoarthritis of both shoulders     DJD (degenerative joint disease), lumbosacral     Essential hypertension     Hyperlipidemia     Depression     Obesity     Postlaminectomy syndrome, lumbar region     Shoulder bursitis     S/P lumbar spinal fusion     Encounter for medication monitoring     Chronic pain syndrome     Posterior dislocation of hip ([address]     History of total right hip replacement     Frequent PVCs     Acute cystitis without hematuria     Primary osteoarthritis of both hips     Right hip pain     History of total right hip arthroplasty     Hip instability     Instability of prosthetic hip ([address]

## 2019-06-04 ENCOUNTER — OFFICE VISIT (OUTPATIENT)
Dept: INTERNAL MEDICINE | Age: 59
End: 2019-06-04
Payer: COMMERCIAL

## 2019-06-04 VITALS
WEIGHT: 174 LBS | SYSTOLIC BLOOD PRESSURE: 139 MMHG | HEART RATE: 102 BPM | DIASTOLIC BLOOD PRESSURE: 90 MMHG | BODY MASS INDEX: 28.08 KG/M2

## 2019-06-04 DIAGNOSIS — J31.0 CHRONIC RHINITIS: ICD-10-CM

## 2019-06-04 DIAGNOSIS — I10 ESSENTIAL HYPERTENSION: Primary | ICD-10-CM

## 2019-06-04 DIAGNOSIS — R73.02 IGT (IMPAIRED GLUCOSE TOLERANCE): ICD-10-CM

## 2019-06-04 DIAGNOSIS — F41.9 ANXIETY: ICD-10-CM

## 2019-06-04 DIAGNOSIS — M77.8 TENDINITIS OF RIGHT WRIST: ICD-10-CM

## 2019-06-04 PROCEDURE — 99211 OFF/OP EST MAY X REQ PHY/QHP: CPT | Performed by: INTERNAL MEDICINE

## 2019-06-04 PROCEDURE — 4004F PT TOBACCO SCREEN RCVD TLK: CPT | Performed by: INTERNAL MEDICINE

## 2019-06-04 PROCEDURE — G8417 CALC BMI ABV UP PARAM F/U: HCPCS | Performed by: INTERNAL MEDICINE

## 2019-06-04 PROCEDURE — G8427 DOCREV CUR MEDS BY ELIG CLIN: HCPCS | Performed by: INTERNAL MEDICINE

## 2019-06-04 PROCEDURE — 3017F COLORECTAL CA SCREEN DOC REV: CPT | Performed by: INTERNAL MEDICINE

## 2019-06-04 PROCEDURE — 99213 OFFICE O/P EST LOW 20 MIN: CPT | Performed by: INTERNAL MEDICINE

## 2019-06-04 RX ORDER — AZELASTINE 1 MG/ML
1 SPRAY, METERED NASAL 2 TIMES DAILY
Qty: 2 BOTTLE | Refills: 1 | Status: SHIPPED | OUTPATIENT
Start: 2019-06-04 | End: 2020-02-11 | Stop reason: ALTCHOICE

## 2019-06-04 RX ORDER — AMLODIPINE BESYLATE 10 MG/1
10 TABLET ORAL DAILY
Qty: 30 TABLET | Refills: 6 | Status: SHIPPED | OUTPATIENT
Start: 2019-06-04 | End: 2020-02-11 | Stop reason: SDUPTHER

## 2019-06-04 RX ORDER — PREDNISONE 20 MG/1
20 TABLET ORAL DAILY
Qty: 5 TABLET | Refills: 0 | Status: SHIPPED | OUTPATIENT
Start: 2019-06-04 | End: 2019-06-09

## 2019-06-04 RX ORDER — FEXOFENADINE HCL 180 MG/1
180 TABLET ORAL DAILY
Qty: 30 TABLET | Refills: 0 | Status: SHIPPED | OUTPATIENT
Start: 2019-06-04 | End: 2019-07-04

## 2019-06-04 RX ORDER — ALPRAZOLAM 0.25 MG/1
0.25 TABLET ORAL 3 TIMES DAILY PRN
Qty: 5 TABLET | Refills: 0 | Status: SHIPPED | OUTPATIENT
Start: 2019-06-04 | End: 2019-07-04

## 2019-06-04 ASSESSMENT — ENCOUNTER SYMPTOMS
WHEEZING: 0
SHORTNESS OF BREATH: 0
RHINORRHEA: 1
SINUS PAIN: 1
BACK PAIN: 1
COUGH: 1
SINUS PRESSURE: 1

## 2019-06-04 NOTE — PROGRESS NOTES
[facility]IC[facility]    Progress Note    Date of patient's visit: 6/4/2019    Patient's Name:  Emely Morales    YOB: 1960            Patient Care Team:  Pepe Paris MD as PCP - General (Internal Medicine)  Pepe Paris MD as PCP - [facility]  Eli Cooper MD as Consulting Physician (Rheumatology)  Susana Lopez MD as Anesthesiologist (Pain Management)  Terri Rivero MD (Orthopedic Surgery)  Rubina Ram MD as Surgeon (Orthopedic Surgery)  Chayito Noble MD as Anesthesiologist (Pain Management)    REASON FOR VISIT: Routine outpatient follow     Chief Complaint   Patient presents with   [facility] Hypertension    Health Maintenance     vaccines refused, labs pended     Otalgia     Pt states that she has been having bilateral ear pain, was given a nasal spray last visit to see if it would help to dry out but still having pain    Hand Pain     Pt c/o having R hand pain over the last few weeks, has some swelling that comes and goes, pt states that it feels broken         HISTORY OF PRESENT ILLNESS:    History was obtained from the patient. Emely Morales is a 62 y.o. is here for follow-up. She continues have nasal congestion and bilateral ear pain. She took Flonase but it causes dryness of her nasal passages and headaches. She has stopped taking it. No sore throat. She has clear nasal drainage. Occasional cough. She is also complaining of pain in the right thumb going up to her left right forearm. She does not work. She cannot take NSAIDs as it causes nausea. No actual allergy. She takes Tylenol for pain. No trauma. No redness. She has severe anxiety. She says every time she comes to the physician she gets anxious. Her blood pressure is high. She says she has a blood pressure cuff at home. She's had problems with anxiety and almost PTSD-like symptoms since she had a hip surgery in the past with Dr. Ron Shabazz.   She's had a traumatic experience and since then has deep distrust of physicians. She does have a history of depression in the past as well. She agrees to seek out counseling. She does not want to take daily medications.   She has not had lab work done as well because of anxiety         Past Medical History:   Diagnosis Date    CAD (coronary artery disease)     pt denies, CARDIAC CATH O.K., STRESS O.K.    Depression 7/30/2013    Fibromyalgia     Full dentures     Hyperlipidemia 7/30/2013    Hypertension     Obesity 7/30/2013    Osteoarthritis     Pain management     [facility]    Wears glasses        Past Surgical History:   Procedure Laterality Date    COLONOSCOPY      polyps removed    FOOT SURGERY  1968    HYSTERECTOMY      HYSTERECTOMY, [address]  2/5/14    caudal #1  celestone 9mg    NERVE BLOCK  2/11/14    caudal #2  celestone 9mg    NERVE BLOCK  02/18/14    caudal# 3, celestone 9 mg    OTHER SURGICAL HISTORY      SPINAL ENDOSCOPY X3    SHOULDER ARTHROSCOPY Left 06/13/15    SHOULDER SURGERY  Right; 2011    SPINE SURGERY  2009    TOTAL HIP ARTHROPLASTY Right 10/12/2016         ALLERGIES      Allergies   Allergen Reactions    Asa [Aspirin] Nausea Only    Eggs Or Egg-Derived Products     Ibuprofen Nausea Only    Neurontin [Gabapentin] Nausea Only    Shellfish-Derived Products     Shrimp Flavor      TROUBLE BREATHING    Tape [Adhesive Tape]        MEDICATIONS:      Current Outpatient Medications on File Prior to Visit   Medication Sig Dispense Refill    colchicine (COLCRYS) 0.6 MG tablet take 1 tablet by mouth once daily 30 tablet 1    omeprazole (PRILOSEC) 20 MG delayed release capsule take 1 capsule by mouth once daily 30 TO 60 MINUTES BEFORE FIRST MEAL OF THE DAY 30 capsule 3    amLODIPine (NORVASC) 10 MG tablet Take 1 tablet by mouth daily 30 tablet 6    Multiple Vitamins-Minerals (THERAPEUTIC MULTIVITAMIN-MINERALS) tablet Take 1 tablet by mouth daily      acetaminophen (TYLENOL) 325 MG tablet Take 2 tablets by mouth as needed for Pain 90 tablet 1     No current facility-administered medications on file prior to visit. SOCIAL HISTORY    Reviewed and no change from previous record. Chinedu Paz  reports that she has been smoking cigarettes and e-cigarettes. She has a 6.25 pack-year smoking history. She has never used smokeless tobacco.    FAMILY HISTORY:    Reviewed and No change from previous visit    HEALTH MAINTENANCE DUE:      Health Maintenance Due   Topic Date Due    Hepatitis C screen  1960    Pneumococcal 0-64 years Vaccine (1 of 1 - PPSV23) 06/27/1966    HIV screen  06/27/1975    DTaP/Tdap/Td vaccine (1 - Tdap) 06/27/1979    Shingles Vaccine (1 of 2) 06/27/2010    Potassium monitoring  10/06/2018    Creatinine monitoring  10/06/2018       REVIEW OF SYSTEMS:    12 point review of symptoms completed and found to be normal except noted in the HPI    Review of Systems   Constitutional: Negative for chills, fatigue and fever. HENT: Positive for congestion, ear pain, postnasal drip, rhinorrhea, sinus pressure and sinus pain. Eyes: Negative for photophobia, redness and visual disturbance. Respiratory: Positive for cough. Negative for shortness of breath and wheezing. Cardiovascular: Negative for chest pain, palpitations and leg swelling. Gastrointestinal: Negative for anal bleeding, constipation and diarrhea. Endocrine: Negative for polydipsia and polyuria. Genitourinary: Negative for dysuria, frequency and hematuria. Musculoskeletal: Positive for arthralgias and back pain. Negative for gait problem. Skin: Negative for rash and wound. Allergic/Immunologic: Positive for environmental allergies. Negative for immunocompromised state. Neurological: Negative for dizziness, syncope, weakness, numbness and headaches. Hematological: Negative for adenopathy. Psychiatric/Behavioral: Negative for dysphoric mood.  The patient is nervous/anxious. PHYSICAL EXAM:     Vitals:    06/04/19 0956   BP: (!) 139/90   Site: Left Upper Arm   Position: Sitting   Cuff Size: Medium Adult   Pulse: 102   Weight: 174 lb (78.9 kg)     Body mass index is 28.08 kg/m². BP Readings from Last 3 Encounters:   06/04/19 (!) 139/90   11/06/18 (!) 142/90   06/21/18 117/80        Wt Readings from Last 3 Encounters:   06/04/19 174 lb (78.9 kg)   11/06/18 172 lb (78 kg)   04/04/18 183 lb (83 kg)       Physical Exam       HENT:  Normocephalic, Atraumatic  Eyes:  PERRL, EOMI, Conjunctiva normal, No discharge. Respiratory:  Normal breath sounds, No respiratory distress, No wheezing, No chest tenderness. Cardiovascular:  Normal heart rate, Normal rhythm, No murmurs  GI:  Bowel sounds normal, Soft, No tenderness, No masses, No pulsatile masses. :   No CVA tenderness. Musculoskeletal:  Intact distal pulses, No edema, No tenderness  Integument:  Warm, Dry, No erythema, No rash. Lymphatic:  No lymphadenopathy noted. Neurologic:  Alert & oriented x 3, Normal motor function, Normal sensory function, No focal deficits noted.    Psychiatric:  Affect anxious      LABORATORY FINDINGS:    CBC:  Lab Results   Component Value Date    WBC 6.0 10/06/2017    HGB 11.7 10/06/2017     10/06/2017     BMP:    Lab Results   Component Value Date     10/06/2017    K 4.1 10/06/2017     10/06/2017    CO2 27 10/06/2017    BUN 13 10/06/2017    CREATININE 0.83 10/06/2017    GLUCOSE 102 10/06/2017     HEMOGLOBIN A1C:   Lab Results   Component Value Date    LABA1C 6.0 11/06/2018     MICROALBUMIN URINE:   Lab Results   Component Value Date    MICROALBUR 31 08/18/2017     FASTING LIPID [email or web address]  Lab Results   Component Value Date    LDLCHOLESTEROL 133 (H) 07/12/2016       LIVER PROFILE:  Lab Results   Component Value Date    ALT 10 10/06/2017    AST 13 10/06/2017    PROT 7.0 10/06/2017    BILITOT 0.30 10/06/2017 LABALBU 3.8 10/06/2017      THYROID FUNCTION:   Lab Results   Component Value Date    TSH 0.48 11/21/2016      URINEANALYSIS: No results found for: LABURIN  ASSESSMENT AND PLAN:    1. Essential hypertension    - amLODIPine (NORVASC) 10 MG tablet; Take 1 tablet by mouth daily  Dispense: 30 tablet; Refill: 6  - TSH with Reflex; Future    2. IGT (impaired glucose tolerance)    - Hemoglobin A1C; Future  - TSH with Reflex; Future    3. Chronic rhinitis  Saline nasal spray  flonase    4. Anxiety    - Ambulatory referral to Psychology  - TSH with Reflex; Future  - ALPRAZolam (XANAX) 0.25 MG tablet; Take 1 tablet by mouth 3 times daily as needed for Anxiety for up to 30 days. Dispense: 5 tablet; Refill: 0    5. Tendinitis of right wrist  Ice  Rest  NSAID's          FOLLOW UP AND INSTRUCTIONS:   Return in about 3 months (around 9/4/2019).2    1. Alverto Montalvo received counseling on the following healthy behaviors: nutrition, exercise and medication adherence    2. Reviewed prior labs and health maintenance. 3. Discussed use, benefit, and side effects of prescribed medications. Barriers to medication compliance addressed. All patient questions answered. Pt voiced understanding.      Kunal Powell  Attending Physician, [address] [place] [address], Internal Medicine Residency Program  [address] [place] [address]  6/4/2019, 10:27 AM

## 2019-06-04 NOTE — PATIENT INSTRUCTIONS
-Pt due for 3 month f/u in September-- pt to call in August to set up an appt--reminder in [facility] to contact patient as well--AVS given to patient    -Bloodwork orders given to patient, they will have them done before their next visit. -Printed script for Xanax signed and given to pt  -Appt with [place] Negrete made for 06/12/2019. If you are unable to make this appointment please contact us to reschedule.  --Low Rivera

## 2019-06-09 ASSESSMENT — ENCOUNTER SYMPTOMS
CONSTIPATION: 0
PHOTOPHOBIA: 0
DIARRHEA: 0
EYE REDNESS: 0
ANAL BLEEDING: 0

## 2019-06-12 ENCOUNTER — OFFICE VISIT (OUTPATIENT)
Dept: BEHAVIORAL/MENTAL HEALTH CLINIC | Age: 59
End: 2019-06-12
Payer: COMMERCIAL

## 2019-06-12 DIAGNOSIS — F32.A DEPRESSION, UNSPECIFIED DEPRESSION TYPE: Primary | ICD-10-CM

## 2019-06-12 DIAGNOSIS — F41.9 ANXIETY: ICD-10-CM

## 2019-06-12 PROCEDURE — 90791 PSYCH DIAGNOSTIC EVALUATION: CPT | Performed by: PSYCHOLOGIST

## 2019-06-12 NOTE — PROGRESS NOTES
Behavioral Health Consultation  Imelda HUFF  Licensed Clinical Psychologist #[id number]  6/12/2019  11:08 AM- 11:45 AM    Time spent with Patient: 37 minutes  [facility]. Reason for Consult:  depression and anxiety  Referring Provider: Tiera Woo MD    Pt provided informed consent for the behavioral health program. Discussed with patient model of service to include the limits of confidentiality (i.e. abuse reporting, suicide intervention, etc.) and short-term intervention focused approach. Pt indicated understanding. Feedback given to PCP. S:   Pt discusses concerns including, chronic pain, need for dental care and another hip replacement. She described feeling fearful related to her reported previous experience with a previous hip replacement in which the the wrong size was allegedly used, and when it dislocated the surgeon reportedly did not come to see her in the hospital for 4 days. Fears and is anger about this experience, with statements from other people such as pain management. Acknowledges drinking and being negative from time to time. O:  MSE:   Mood was Depressed and Irritable, with Congruent affect. Suicidal ideation was denied. Homicidal ideation was denied. Hygiene was Good. Dress was Appropriate and Casual.   Behavior was characterized by psychomotor agitation and mistrustful with Yes observation or self-report of difficulties standing/ambulating. Attitude was Guarded and Help-seeking. Eye-contact was Fair. Speech: rate- Rapid, rhythm-  WNL, volume- WNL  Verbalizations were  verbose. Thought processes were intact and goal-oriented without evidence of delusions, hallucinations, obsessions, or anthony; with significant cognitive distortions. Associations were characterized by circumstantial cognitive processes.   Pt was orientated oriented to person, place, time, and general circumstances;  recent:  good and fair and remote:  good and fair.  Insight and judgment were estimated to be fair, AEB, a fair  understanding of cyclical maladaptive patterns, and the ability to use insight to inform behavior change. A:   Introduced pt to [facility] model of care and began to clarify Her treatment needs, including distress and symptoms of chronic pain, anxiety and depressed mood. Pt interventions:  Established rapport and [place]-setting to identify pt's primary goals for [facility] visit / overall health      Pt Behavioral Change Plan:   1. Please complete Patient Health Questionnaire and bring this with you to your next consultation. 2. If you need to reschedule or cancel please call [phone], option 3. If you need to speak directly to Dr. Renay Kaplan call [phone]. 3. See handout about common reactions to trauma, is this a good description of what you struggle with and have experienced? 4. Follow up in 2 weeks. Diagnosis:  The primary encounter diagnosis was Depression, unspecified depression type. A diagnosis of Anxiety was also pertinent to this visit.       Diagnosis Date    CAD (coronary artery disease)     pt denies, CARDIAC CATH O.K., STRESS O.K.    Depression 7/30/2013    Fibromyalgia     Full dentures     Hyperlipidemia 7/30/2013    Hypertension     Obesity 7/30/2013    Osteoarthritis     Pain management     sees [facility] pain clinic    Wears glasses        History:    Social History:   Social History     Socioeconomic History    Marital status: Single     Spouse name: Not on file    Number of children: Not on file    Years of education: Not on file    Highest education level: Not on file   Occupational History    Occupation: disability     Employer: N/A   Social Needs    Financial resource strain: Not on file    Food insecurity:     Worry: Not on file     Inability: Not on file    Transportation needs:     Medical: Not on file     Non-medical: Not on file   Tobacco Use    Smoking status: Current Every Day Smoker Packs/day: 0.25     Years: 25.00     Pack years: 6.25     Types: Cigarettes, E-Cigarettes    Smokeless tobacco: Never Used    Tobacco comment: e-cigarette currently with occasional hit off a cigarette   Substance and Sexual Activity    Alcohol use: Yes     Alcohol/week: 0.0 oz     Comment: occasionally    Drug use: No     Types: Marijuana     Comment: crack YRS AGO    Sexual activity: Yes   Lifestyle    Physical activity:     Days per week: Not on file     Minutes per session: Not on file    Stress: Not on file   Relationships    Social connections:     Talks on phone: Not on file     Gets together: Not on file     Attends Pentecostal service: Not on file     Active member of club or organization: Not on file     Attends meetings of clubs or organizations: Not on file     Relationship status: Not on file    Intimate partner violence:     Fear of current or ex partner: Not on file     Emotionally abused: Not on file     Physically abused: Not on file     Forced sexual activity: Not on file   Other Topics Concern    Not on file   Social History Narrative    Not on file       TOBACCO:   reports that she has been smoking cigarettes and e-cigarettes. She has a 6.25 pack-year smoking history. She has never used smokeless tobacco.  ETOH:   reports that she drinks alcohol.     Family History:   Family History   Problem Relation Age of Onset   Ziebach Rizwan COPD Mother    Ziebach Rizwan Coronary Art Dis Father    Ziebach Rizwan Cancer Sister     Coronary Art Dis Sister     Cirrhosis Sister     Arthritis Brother     Asthma Brother     Arthritis Sister     Asthma Sister    Ziebach Rizwan Cancer Sister     Asthma Sister     Cancer Sister     Asthma Sister     Asthma Brother     Arthritis Brother

## 2019-06-12 NOTE — PATIENT INSTRUCTIONS
1. Please complete Patient Health Questionnaire and bring this with you to your next consultation. 2. If you need to reschedule or cancel please call [phone], option 3. If you need to speak directly to Dr. Melendez Aid call [phone]. 3. See handout about common reactions to trauma, is this a good description of what you struggle with and have experienced? 4. Follow up in 2 weeks. Common Reactions to Violence and Trauma  Suggestions for Self-Care  by the Community Crisis Response Team, a Service of the Victims of [place], [place]    Which people are most affected by violence and trauma? Many people may be affected by a violent event - both people who have been direct victims of violence, as well as family, friends, or others who have connections to the victims. Following a traumatic event - whether it happens to you, or to someone you know - it is normal to feel it personally. We know that when people are direct victims of violence, they often have physical and emotional reactions that can last for a long time. But other people - family, friends, co-workers, emergency service personnel, neighbors, professional caregivers, witnesses to the violence, or others who have something in common with the victims - may also have reactions to a particular violent event. Although each person reacts differently, according to his/her personality, past experiences, and connection to the event, a wide range of common feelings and reactions can occur after a person has been involved in or heard about a traumatic or violent event. How do people react to violence and trauma? It depends on the individual. Each person will have his or her own set of reactions. Here is a partial list of normal reactions to violence and other traumatic events. Each individual may have a number of several of these reactions.     Cognitive- Thoughts  Difficulty remembering things  Hard time making decisions  Confusion  Distortion of time  Difficulty concentrating   Too many thoughts at once  Thinking about suicide  Threatened assumptions (that the world is not safe or less safe than before)  Intrusive images  Flashbacks  Replaying the event    Psychological- Emotional, Feelings   Feeling helpless, hopeless or powerless  Grief/numbness  Dread/fear/safety concerns  Guilt  Dependency   Feeling overwhelmed or vulnerable  Feeling not yourself  Triggering of prior trauma or losses   Emotional rollercoaster   Nightmares    Physical- Physiology, sensations in body   Fatigue/change in sleep habits   Eating/appetite problems   Stomach problems   Vomiting/diarrhea   Sweating, rapid pulse, chest pains   Dizziness, headaches   Back or neck pain  Startle reactions  Catch colds or flu    Spiritual   Loss of sarah   Spiritual doubts   Withdrawal from Rastafari community  Lapses in spiritual practice   Despair  Questioning old beliefs   Sense of the world being changed, out of kilter    Relational   Withdrawing from, or clinging to, others  Alienation from friends, family, co-workers who \"don't understand\"   Breakdown in trust   Changes in sexual activity   False or distorted generalizations about others   Doubts about relationships   Alternately demanding or distant with others  Irritability    What can you do to recover from trauma? Different strategies work for different people. In the aftermath of violence and other trauma, people sometimes find themselves at a loss for how to deal with their feelings and reactions. One of the most important things is to establish some kind of routine, even if it is temporary or differs from your usual one. Listed below are some specific strategies that can help speed your recovery from trauma. Strategy: Diet   As best you can, try to eat regularly. You may be tempted to eat lots of sweets, soft drinks, or coffee.  Sugar and caffeine can actually increase your overall stress level, so try to limit how much of these you use. Sometimes under extreme stress, people use more alcohol or other drugs than usual. Alcohol and drugs may postpone some feelings or reactions, but they can actually make them worse. Use common sense about what you put into your body at this particular stressful time. Strategy: Rest and Relaxation  It is important to maintain a regular schedule that allows for adequate amounts of sleep and relaxing, stress reducing activities. If you know any formal relaxation techniques, such as meditation or deep breathing exercises, use them. Otherwise, use whatever strategies usually help you relax: listen to music, read, go to Oriental orthodox, take a walk, play with your children. Strategy: Physical Activity   Exercise is one of the best ways of reducing stress. Although it may be difficult to find time for this, try to work it into your day. If you usually exercise, try working it back into your schedule. Walking is a great form of exercise. Be sure to check with your physician if you do not usually exercise. Encourage your children and yourself to play. It isn't just fun; it is a way for them and you to manage stress and anxious feelings. Strategy: Social Contacts   Keeping contact with your family, friends, and co-workers and others who have gone through similar experiences, is another good strategy to reduce stress. You may sometimes want to be by yourself and that is fine. However, isolating yourself from those who know and care about you may make matters worse. Try to keep in contact as much as possible. Children, in particular, may need the attention and close physical contact of their parents and other caretakers. Strategy: Support Systems   Talking about your own reactions to violence does help, even though it can be difficult. It is important that you choose people who will really listen to how you feel.  Supportive listeners may be friends, family, clergy, teachers, or self-help groups. They may also be professional counselors. Keep in mind that people benefit most from counseling when they want it. Strategy: Support to Others  Offering support to others, in addition to taking care of yourself, can help in recovering from the emotional impact of trauma. Many people find strength in participating in special events or community activities which honor victims or offer support to their loved ones. Orthodox services, community discussion and support groups, public ceremonies or memorials, and political activities are not for everyone. It is important that you become involved in such activities only when you choose to. What can you expect in the course of recovery? Recovery from the emotional impact of violence takes time and involves many different feelings. While we know there is a wide range of common, normal reactions to the experience of violence or trauma, we also know that each person may not have exactly the same feelings or reactions. Sometimes feelings change quickly or seem to go from one extreme to another. Try to be understanding of yourself and those you care about and recall that you may not have the same feelings or have them at exactly the same time. Often people expect that their reactions should go away quickly, but this is not usually the case. Outside events sometimes slow down the recovery process. These may include media coverage of the event, court dates or times such as birthdays, holidays, or the anniversary of the event. If often helps to anticipate you might have feelings or reactions during these times. Keep in mind that this is common and usually passes with time. You will probably find that others are having similar reactions. Again, talking with someone you trust can be very helpful. ..     KEY POINTS:  * Many people may be affected be violence and trauma, including direct victims and many other people who have personal or work-related contact with victims. * Each person has a unique reaction composed of many different normal reactions. Reactions may be cognitive, physical, spiritual, psychological or relational.    * Self-care is very important. Different strategies of self-care will be effective for different people. * Recovery takes time but will take place given adequate support. This text was prepared by staff and colleagues of [employer] Team a service of the Victims of Violence Program, [address] [place] [address] [phone].

## 2019-07-11 ENCOUNTER — OFFICE VISIT (OUTPATIENT)
Dept: BEHAVIORAL/MENTAL HEALTH CLINIC | Age: 59
End: 2019-07-11
Payer: COMMERCIAL

## 2019-07-11 DIAGNOSIS — F41.9 ANXIETY: ICD-10-CM

## 2019-07-11 DIAGNOSIS — F32.A DEPRESSION, UNSPECIFIED DEPRESSION TYPE: Primary | ICD-10-CM

## 2019-07-11 PROCEDURE — 90832 PSYTX W PT 30 MINUTES: CPT | Performed by: PSYCHOLOGIST

## 2019-07-11 NOTE — PROGRESS NOTES
Years: 25.00     Pack years: 6.25     Types: Cigarettes, E-Cigarettes    Smokeless tobacco: Never Used    Tobacco comment: e-cigarette currently with occasional hit off a cigarette   Substance and Sexual Activity    Alcohol use: Yes     Alcohol/week: 0.0 oz     Comment: occasionally    Drug use: No     Types: Marijuana     Comment: crack YRS AGO    Sexual activity: Yes   Lifestyle    Physical activity:     Days per week: Not on file     Minutes per session: Not on file    Stress: Not on file   Relationships    Social connections:     Talks on phone: Not on file     Gets together: Not on file     Attends Jewish service: Not on file     Active member of club or organization: Not on file     Attends meetings of clubs or organizations: Not on file     Relationship status: Not on file    Intimate partner violence:     Fear of current or ex partner: Not on file     Emotionally abused: Not on file     Physically abused: Not on file     Forced sexual activity: Not on file   Other Topics Concern    Not on file   Social History Narrative    Not on file       TOBACCO:   reports that she has been smoking cigarettes and e-cigarettes. She has a 6.25 pack-year smoking history. She has never used smokeless tobacco.  ETOH:   reports that she drinks alcohol.     Family History:   Family History   Problem Relation Age of Onset   [facility] COPD Mother    [facility] Coronary Art Dis Father    [facility] Cancer Sister     Coronary Art Dis Sister     Cirrhosis Sister     Arthritis Brother     Asthma Brother     Arthritis Sister     Asthma Sister    [facility] Cancer Sister     Asthma Sister     Cancer Sister     Asthma Sister     Asthma Brother     Arthritis Brother

## 2019-08-02 ENCOUNTER — TELEPHONE (OUTPATIENT)
Dept: INTERNAL MEDICINE | Age: 59
End: 2019-08-02

## 2019-08-05 RX ORDER — COLCHICINE 0.6 MG/1
TABLET ORAL
Qty: 30 TABLET | Refills: 1 | Status: SHIPPED | OUTPATIENT
Start: 2019-08-05 | End: 2019-09-28 | Stop reason: SDUPTHER

## 2019-08-05 NOTE — TELEPHONE ENCOUNTER
Faxed refill request from [address] [place] [address] for Colchicine 0.6 mg.  Pt has follow up appt on 9/3/19. Health Maintenance   Topic Date Due    Hepatitis C screen  1960    HIV screen  06/27/1975    Potassium monitoring  10/06/2018    Creatinine monitoring  10/06/2018    DTaP/Tdap/Td vaccine (1 - Tdap) 12/04/2019 (Originally 6/27/1979)    Shingles Vaccine (1 of 2) 12/04/2019 (Originally 6/27/2010)    Pneumococcal 0-64 years Vaccine (1 of 1 - PPSV23) 12/04/2019 (Originally 6/27/1966)    Flu vaccine (1) 09/01/2019    A1C test (Diabetic or Prediabetic)  11/06/2019    Breast cancer screen  01/18/2020    Lipid screen  07/12/2021    Colon cancer screen colonoscopy  12/29/2024             (applicable per patient's age: Cancer Screenings, Depression Screening, Fall Risk Screening, Immunizations)    Hemoglobin A1C (%)   Date Value   11/06/2018 6.0   05/26/2017 6.2   11/21/2016 5.6     Microalb/Crt.  Ratio (mcg/mg creat)   Date Value   08/18/2017 21     LDL Cholesterol (mg/dL)   Date Value   07/12/2016 133 (H)     AST (U/L)   Date Value   10/06/2017 13     ALT (U/L)   Date Value   10/06/2017 10     BUN (mg/dL)   Date Value   10/06/2017 13      (goal A1C is < 7)   (goal LDL is <100) need 30-50% reduction from baseline     BP Readings from Last 3 Encounters:   06/04/19 (!) 139/90   11/06/18 (!) 142/90   06/21/18 117/80    (goal /80)      All Future Testing planned in [facility]:  Lab Frequency Next Occurrence   Hemoglobin A1C Once 09/05/2019   TSH with Reflex Once 09/05/2019       Next Visit Date:  Future Appointments   Date Time Provider Opal Bellamy   8/12/2019  4:00 PM Leif Clock, JONES Coronanash Devika   9/3/2019 10:15 AM Juany Lui MD [facility]            Patient Active Problem List:     Osteoarthritis of both knees     Osteoarthritis of both shoulders     DJD (degenerative joint disease), lumbosacral     Essential hypertension     Hyperlipidemia     Depression     Obesity     Postlaminectomy

## 2019-08-12 ENCOUNTER — OFFICE VISIT (OUTPATIENT)
Dept: BEHAVIORAL/MENTAL HEALTH CLINIC | Age: 59
End: 2019-08-12
Payer: COMMERCIAL

## 2019-08-12 DIAGNOSIS — F41.9 ANXIETY: ICD-10-CM

## 2019-08-12 DIAGNOSIS — F32.A DEPRESSION, UNSPECIFIED DEPRESSION TYPE: Primary | ICD-10-CM

## 2019-08-12 PROCEDURE — 90832 PSYTX W PT 30 MINUTES: CPT | Performed by: PSYCHOLOGIST

## 2019-08-12 ASSESSMENT — PATIENT HEALTH QUESTIONNAIRE - PHQ9
5. POOR APPETITE OR OVEREATING: 2
10. IF YOU CHECKED OFF ANY PROBLEMS, HOW DIFFICULT HAVE THESE PROBLEMS MADE IT FOR YOU TO DO YOUR WORK, TAKE CARE OF THINGS AT HOME, OR GET ALONG WITH OTHER PEOPLE: 1
SUM OF ALL RESPONSES TO PHQ QUESTIONS 1-9: 14
SUM OF ALL RESPONSES TO PHQ QUESTIONS 1-9: 14
8. MOVING OR SPEAKING SO SLOWLY THAT OTHER PEOPLE COULD HAVE NOTICED. OR THE OPPOSITE, BEING SO FIGETY OR RESTLESS THAT YOU HAVE BEEN MOVING AROUND A LOT MORE THAN USUAL: 2
7. TROUBLE CONCENTRATING ON THINGS, SUCH AS READING THE NEWSPAPER OR WATCHING TELEVISION: 2
SUM OF ALL RESPONSES TO PHQ9 QUESTIONS 1 & 2: 3
3. TROUBLE FALLING OR STAYING ASLEEP: 2
9. THOUGHTS THAT YOU WOULD BE BETTER OFF DEAD, OR OF HURTING YOURSELF: 0
6. FEELING BAD ABOUT YOURSELF - OR THAT YOU ARE A FAILURE OR HAVE LET YOURSELF OR YOUR FAMILY DOWN: 2
2. FEELING DOWN, DEPRESSED OR HOPELESS: 1
1. LITTLE INTEREST OR PLEASURE IN DOING THINGS: 2
4. FEELING TIRED OR HAVING LITTLE ENERGY: 1

## 2019-08-12 ASSESSMENT — ANXIETY QUESTIONNAIRES
2. NOT BEING ABLE TO STOP OR CONTROL WORRYING: 1-SEVERAL DAYS
3. WORRYING TOO MUCH ABOUT DIFFERENT THINGS: 2-OVER HALF THE DAYS
6. BECOMING EASILY ANNOYED OR IRRITABLE: 2-OVER HALF THE DAYS
4. TROUBLE RELAXING: 2-OVER HALF THE DAYS
GAD7 TOTAL SCORE: 10
1. FEELING NERVOUS, ANXIOUS, OR ON EDGE: 1-SEVERAL DAYS
5. BEING SO RESTLESS THAT IT IS HARD TO SIT STILL: 1-SEVERAL DAYS
7. FEELING AFRAID AS IF SOMETHING AWFUL MIGHT HAPPEN: 1-SEVERAL DAYS

## 2019-08-12 NOTE — PROGRESS NOTES
a little): stomach pain, sleep disturbance with hypersomnia, pain/problems during sexual intercourse, headaches, chest pain, dizziness, shortness of breath, constipation/diarrhea, and nausea/indigestion. PHQ Scores 8/12/2019 6/21/2018 5/26/2017 3/17/2016   PHQ2 Score 3 0 0 0   PHQ9 Score 14 3 0 0     Interpretation of Total Score Depression Severity: 1-4 = Minimal depression, 5-9 = Mild depression, 10-14 = Moderate depression, 15-19 = Moderately severe depression, 20-27 = Severe depression    MILE 7 SCORE 8/12/2019   MILE-7 Total Score 10     Interpretation of MILE-7 score: 5-9 = mild anxiety, 10-14 = moderate anxiety, 15+ = severe anxiety. Recommend referral to behavioral health for scores 10 or greater. Pt interventions:  Collaborative treatment planning,Clarified role of [facility] in primary care,Recommended that pt establish with a mental health clinician with whom they can meet regularly for psychotherapy services      Pt Behavioral Change Plan:   1. None. Diagnosis:  The primary encounter diagnosis was Depression, unspecified depression type. A diagnosis of Anxiety was also pertinent to this visit.       Diagnosis Date    CAD (coronary artery disease)     pt denies, CARDIAC CATH O.K., STRESS O.K.    Depression 7/30/2013    Fibromyalgia     Full dentures     Hyperlipidemia 7/30/2013    Hypertension     Obesity 7/30/2013    Osteoarthritis     Pain management     sees [facility]    Wears glasses        History:    Social History:   Social History     Socioeconomic History    Marital status: Single     Spouse name: Not on file    Number of children: Not on file    Years of education: Not on file    Highest education level: Not on file   Occupational History    Occupation: disability     Employer: N/A   Social Needs    Financial resource strain: Not on file    Food insecurity:     Worry: Not on file     Inability: Not on file    Transportation needs:     Medical: Not on file

## 2019-09-03 ENCOUNTER — OFFICE VISIT (OUTPATIENT)
Dept: INTERNAL MEDICINE | Age: 59
End: 2019-09-03
Payer: COMMERCIAL

## 2019-09-03 VITALS
DIASTOLIC BLOOD PRESSURE: 82 MMHG | HEIGHT: 66 IN | BODY MASS INDEX: 27.48 KG/M2 | WEIGHT: 171 LBS | HEART RATE: 84 BPM | SYSTOLIC BLOOD PRESSURE: 133 MMHG

## 2019-09-03 DIAGNOSIS — F17.200 SMOKER: ICD-10-CM

## 2019-09-03 DIAGNOSIS — E78.00 PURE HYPERCHOLESTEROLEMIA: ICD-10-CM

## 2019-09-03 DIAGNOSIS — Z13.31 POSITIVE DEPRESSION SCREENING: ICD-10-CM

## 2019-09-03 DIAGNOSIS — M96.1 POSTLAMINECTOMY SYNDROME, LUMBAR REGION: ICD-10-CM

## 2019-09-03 DIAGNOSIS — R73.02 IGT (IMPAIRED GLUCOSE TOLERANCE): ICD-10-CM

## 2019-09-03 DIAGNOSIS — F41.9 ANXIETY: ICD-10-CM

## 2019-09-03 DIAGNOSIS — M16.0 PRIMARY OSTEOARTHRITIS OF BOTH HIPS: ICD-10-CM

## 2019-09-03 DIAGNOSIS — J31.0 CHRONIC RHINITIS: ICD-10-CM

## 2019-09-03 DIAGNOSIS — I10 ESSENTIAL HYPERTENSION: Primary | ICD-10-CM

## 2019-09-03 LAB — HBA1C MFR BLD: 5.6 %

## 2019-09-03 PROCEDURE — 99211 OFF/OP EST MAY X REQ PHY/QHP: CPT | Performed by: INTERNAL MEDICINE

## 2019-09-03 PROCEDURE — 99214 OFFICE O/P EST MOD 30 MIN: CPT | Performed by: INTERNAL MEDICINE

## 2019-09-03 PROCEDURE — G8417 CALC BMI ABV UP PARAM F/U: HCPCS | Performed by: INTERNAL MEDICINE

## 2019-09-03 PROCEDURE — 3017F COLORECTAL CA SCREEN DOC REV: CPT | Performed by: INTERNAL MEDICINE

## 2019-09-03 PROCEDURE — G8431 POS CLIN DEPRES SCRN F/U DOC: HCPCS | Performed by: INTERNAL MEDICINE

## 2019-09-03 PROCEDURE — 83036 HEMOGLOBIN GLYCOSYLATED A1C: CPT | Performed by: INTERNAL MEDICINE

## 2019-09-03 PROCEDURE — G8427 DOCREV CUR MEDS BY ELIG CLIN: HCPCS | Performed by: INTERNAL MEDICINE

## 2019-09-03 PROCEDURE — 4004F PT TOBACCO SCREEN RCVD TLK: CPT | Performed by: INTERNAL MEDICINE

## 2019-09-03 RX ORDER — FLUTICASONE PROPIONATE 50 MCG
1 SPRAY, SUSPENSION (ML) NASAL DAILY
Qty: 1 BOTTLE | Refills: 3 | Status: SHIPPED | OUTPATIENT
Start: 2019-09-03 | End: 2020-02-11 | Stop reason: ALTCHOICE

## 2019-09-03 RX ORDER — OMEPRAZOLE 20 MG/1
CAPSULE, DELAYED RELEASE ORAL
Qty: 30 CAPSULE | Refills: 3 | Status: SHIPPED | OUTPATIENT
Start: 2019-09-03 | End: 2019-12-26

## 2019-09-03 ASSESSMENT — ENCOUNTER SYMPTOMS
COUGH: 0
BLOOD IN STOOL: 0
ABDOMINAL PAIN: 0
DIARRHEA: 0
SHORTNESS OF BREATH: 0
NAUSEA: 0
WHEEZING: 0
CONSTIPATION: 0
BACK PAIN: 1
SINUS PAIN: 0
SINUS PRESSURE: 0

## 2019-09-03 NOTE — TELEPHONE ENCOUNTER
Faxed refill request from [facility] Omeprazole 20 mg. Pt has an appt on 9/3/19. Health Maintenance   Topic Date Due    Hepatitis C screen  1960    HIV screen  06/27/1975    Potassium monitoring  10/06/2018    Creatinine monitoring  10/06/2018    Flu vaccine (1) 09/01/2019    DTaP/Tdap/Td vaccine (1 - Tdap) 12/04/2019 (Originally 6/27/1979)    Shingles Vaccine (1 of 2) 12/04/2019 (Originally 6/27/2010)    Pneumococcal 0-64 years Vaccine (1 of 1 - PPSV23) 12/04/2019 (Originally 6/27/1966)    A1C test (Diabetic or Prediabetic)  11/06/2019    Breast cancer screen  01/18/2020    Lipid screen  07/12/2021    Colon cancer screen colonoscopy  12/29/2024             (applicable per patient's age: Cancer Screenings, Depression Screening, Fall Risk Screening, Immunizations)    Hemoglobin A1C (%)   Date Value   11/06/2018 6.0   05/26/2017 6.2   11/21/2016 5.6     Microalb/Crt.  Ratio (mcg/mg creat)   Date Value   08/18/2017 21     LDL Cholesterol (mg/dL)   Date Value   07/12/2016 133 (H)     AST (U/L)   Date Value   10/06/2017 13     ALT (U/L)   Date Value   10/06/2017 10     BUN (mg/dL)   Date Value   10/06/2017 13      (goal A1C is < 7)   (goal LDL is <100) need 30-50% reduction from baseline     BP Readings from Last 3 Encounters:   06/04/19 (!) 139/90   11/06/18 (!) 142/90   06/21/18 117/80    (goal /80)      All Future Testing planned in [facility]:  Lab Frequency Next Occurrence   Hemoglobin A1C Once 09/05/2019   TSH with Reflex Once 09/05/2019       Next Visit Date:  Future Appointments   Date Time Provider Opal Bellamy   9/3/2019 10:15 AM Eyal Maguire MD [facility]            Patient Active Problem List:     Osteoarthritis of both knees     Osteoarthritis of both shoulders     DJD (degenerative joint disease), lumbosacral     Essential hypertension     Hyperlipidemia     Depression     Obesity     Postlaminectomy syndrome, lumbar region     Shoulder bursitis     S/P lumbar spinal fusion     Encounter for medication monitoring     Chronic pain syndrome     Posterior dislocation of hip ([address]     History of total right hip replacement     Frequent PVCs     Acute cystitis without hematuria     Primary osteoarthritis of both hips     Right hip pain     History of total right hip arthroplasty     Hip instability     Instability of prosthetic hip ([address]

## 2019-09-03 NOTE — PROGRESS NOTES
[facility]    Progress Note    Date of patient's visit: 9/3/2019    Patient's Name:  Erasmo Moreno    YOB: 1960            Patient Care Team:  Eva Haskins MD as PCP - General (Internal Medicine)  Eva Haskins MD as PCP - [facility]  Ria Ho MD as Consulting Physician (Rheumatology)  Vandana Delgado MD as Anesthesiologist (Pain Management)  Jason Adam MD (Orthopedic Surgery)  Santa Rubi MD as Surgeon (Orthopedic Surgery)  Moises Lafleur MD as Anesthesiologist (Pain Management)    REASON FOR VISIT: Routine outpatient follow     Chief Complaint   Patient presents with    Hypertension    Anxiety    Health Maintenance     CMP pended          HISTORY OF PRESENT ILLNESS:    History was obtained from the patient. Erasmo Moreno is a 61 y.o. is here for low up on her chronic medical problems including hypertension, anxiety and depression. She had another episode this weekend involving a family member which has left her anxious. She was seeing Dr. Tim Snyder but he has moved. Currently she does not want to see anybody else. She is taking her medications. Blood pressure is a little better today. She is continuing to have problems with her hips. She had hip surgery in the past which was a traumatic experience for her. She is having left hip pain as well. She wants to see a different orthopedic physician. She has been to [facility] as well. She has a deep distrust of orthopedic physicians due to her previous bad experience. She continues to have ear pain and nasal congestion. She tried Flonase but it caused too much dryness and headaches. She was given as a lasting but has not taken it regularly. She also gets very anxious about blood work and has not had any of her labs done in a couple of years. A1c today is better. She has lost some weight from last year due to stress.       Results for POC orders placed in visit on 09/03/19   POCT glycosylated hemoglobin (Hb A1C)   Result Value Ref Range    Hemoglobin A1C 5.6 %              Past Medical History:   Diagnosis Date    CAD (coronary artery disease)     pt denies, CARDIAC CATH O.K., STRESS O.K.    Depression 7/30/2013    Fibromyalgia     Full dentures     Hyperlipidemia 7/30/2013    Hypertension     Obesity 7/30/2013    Osteoarthritis     Pain management     se[facility]    Wears glasses        Past Surgical History:   Procedure Laterality Date    COLONOSCOPY      polyps removed   [address]  2/5/14    caudal #1  celestone 9mg    NERVE BLOCK  2/11/14    caudal #2  celestone 9mg    NERVE BLOCK  02/18/14    caudal# 3, celestone 9 mg    OTHER SURGICAL HISTORY      SPINAL ENDOSCOPY X3    SHOULDER ARTHROSCOPY Left 06/13/15    SHOULDER SURGERY  Right; 2011    SPINE SURGERY  2009    TOTAL HIP ARTHROPLASTY Right 10/12/2016         ALLERGIES      Allergies   Allergen Reactions    Asa [Aspirin] Nausea Only    Eggs Or Egg-Derived Products     Ibuprofen Nausea Only    Neurontin [Gabapentin] Nausea Only    Shellfish-Derived Products     Shrimp Flavor      TROUBLE BREATHING    Tape [Adhesive Tape]        MEDICATIONS:      Current Outpatient Medications on File Prior to Visit   Medication Sig Dispense Refill    colchicine (COLCRYS) 0.6 MG tablet take 1 tablet by mouth once daily 30 tablet 1    azelastine (ASTELIN) 0.1 % nasal spray 1 spray by Nasal route 2 times daily Use in each nostril as directed 2 Bottle 1    amLODIPine (NORVASC) 10 MG tablet Take 1 tablet by mouth daily 30 tablet 6    omeprazole (PRILOSEC) 20 MG delayed release capsule take 1 capsule by mouth once daily 30 TO 60 MINUTES BEFORE FIRST MEAL OF THE DAY 30 capsule 3    Multiple Vitamins-Minerals (THERAPEUTIC MULTIVITAMIN-MINERALS) tablet Take 1 tablet by mouth daily     

## 2019-09-03 NOTE — PATIENT INSTRUCTIONS
-Pt due for 4 month f/u in January-- pt to call in December to set up an appt--reminder in [facility] to contact patient as well--AVS given to patient    -Bloodwork orders given to patient, they will have them done before their next visit. -Xray order given to pt, this test does not need to be scheduled, please take order with you to Main Registration at hospital and have completed before your next appointment.     -letter for [employer] duty given to patient--Varsha

## 2019-09-03 NOTE — PROGRESS NOTES
Visit Information    Have you changed or started any medications since your last visit including any over-the-counter medicines, vitamins, or herbal medicines? no   Are you having any side effects from any of your medications? -  no  Have you stopped taking any of your medications? Is so, why? -  no    Have you seen any other physician or provider since your last visit? Yes - Records Obtained  Have you had any other diagnostic tests since your last visit? No  Have you been seen in the emergency room and/or had an admission to a hospital since we last saw you? No  Have you had your routine dental cleaning in the past 6 months? yes -     Have you activated your [employer] account? If not, what are your barriers?  Yes     Patient Care Team:  Yakelin Monreal MD as PCP - General (Internal Medicine)  Yakelin Monreal MD as PCP - [facility] [place]  Radha Tamez MD as Consulting Physician (Rheumatology)  Monica Savage MD as Anesthesiologist (Pain Management)  Jenny Mackey MD (Orthopedic Surgery)  Grace Perea MD as Surgeon (Orthopedic Surgery)  Sofia Mo MD as Anesthesiologist (Pain Management)    Medical History Review  Past Medical, Family, and Social History reviewed and does contribute to the patient presenting condition    Health Maintenance   Topic Date Due    Hepatitis C screen  1960    HIV screen  06/27/1975    Potassium monitoring  10/06/2018    Creatinine monitoring  10/06/2018    Flu vaccine (1) 09/01/2019    DTaP/Tdap/Td vaccine (1 - Tdap) 12/04/2019 (Originally 6/27/1979)    Shingles Vaccine (1 of 2) 12/04/2019 (Originally 6/27/2010)    Pneumococcal 0-64 years Vaccine (1 of 1 - PPSV23) 12/04/2019 (Originally 6/27/1966)    A1C test (Diabetic or Prediabetic)  11/06/2019    Breast cancer screen  01/18/2020    Lipid screen  07/12/2021    Colon cancer screen colonoscopy  12/29/2024

## 2019-09-24 ENCOUNTER — HOSPITAL ENCOUNTER (OUTPATIENT)
Dept: GENERAL RADIOLOGY | Age: 59
Discharge: HOME | End: 2019-09-26
Payer: COMMERCIAL

## 2019-09-24 ENCOUNTER — HOSPITAL ENCOUNTER (OUTPATIENT)
Age: 59
Discharge: HOME | End: 2019-09-26
Payer: COMMERCIAL

## 2019-09-24 DIAGNOSIS — M16.0 PRIMARY OSTEOARTHRITIS OF BOTH HIPS: ICD-10-CM

## 2019-09-24 PROCEDURE — 73502 X-RAY EXAM HIP UNI 2-3 VIEWS: CPT

## 2019-12-20 ENCOUNTER — TELEPHONE (OUTPATIENT)
Dept: INTERNAL MEDICINE | Age: 59
End: 2019-12-20

## 2019-12-26 RX ORDER — OMEPRAZOLE 20 MG/1
CAPSULE, DELAYED RELEASE ORAL
Qty: 30 CAPSULE | Refills: 3 | Status: SHIPPED | OUTPATIENT
Start: 2019-12-26 | End: 2020-02-11 | Stop reason: SDUPTHER

## 2020-01-24 ENCOUNTER — TELEPHONE (OUTPATIENT)
Dept: INTERNAL MEDICINE | Age: 60
End: 2020-01-24

## 2020-01-31 RX ORDER — COLCHICINE 0.6 MG/1
TABLET ORAL
Qty: 30 TABLET | Refills: 1 | Status: SHIPPED | OUTPATIENT
Start: 2020-01-31 | End: 2020-03-09

## 2020-01-31 NOTE — TELEPHONE ENCOUNTER
Request for colcrys. Next Visit Date:  Future Appointments   Date Time Provider Opal Bellamy   2/11/2020 11:00 AM Lubna Man MD [facility]   Topic Date Due    Pneumococcal 0-64 years Vaccine (1 of 1 - PPSV23) 06/27/1966    DTaP/Tdap/Td vaccine (1 - Tdap) 06/27/1971    Shingles Vaccine (1 of 2) 06/27/2010    Potassium monitoring  10/06/2018    Creatinine monitoring  10/06/2018    Annual Wellness Visit (AWV)  06/04/2019    Flu vaccine (1) 09/01/2019    Breast cancer screen  01/18/2020    Hepatitis C screen  03/03/2020 (Originally 1960)    HIV screen  03/03/2020 (Originally 6/27/1975)    Lipid screen  07/12/2021    Diabetes screen  09/03/2022    Colon cancer screen colonoscopy  12/29/2024       Hemoglobin A1C (%)   Date Value   09/03/2019 5.6   11/06/2018 6.0   05/26/2017 6.2             ( goal A1C is < 7)   Microalb/Crt.  Ratio (mcg/mg creat)   Date Value   08/18/2017 21     LDL Cholesterol (mg/dL)   Date Value   07/12/2016 133 (H)       (goal LDL is <100)   AST (U/L)   Date Value   10/06/2017 13     ALT (U/L)   Date Value   10/06/2017 10     BUN (mg/dL)   Date Value   10/06/2017 13     BP Readings from Last 3 Encounters:   09/03/19 133/82   06/04/19 (!) 139/90   11/06/18 (!) 142/90          (goal 120/80)    All Future Testing planned in [facility]  Lab Frequency Next Occurrence   Basic Metabolic Panel Once 69/37/9672   TSH with Reflex Once 02/24/2020   CBC With Auto Differential Once 03/20/2020   Lipid Panel Once 02/24/2020         Patient Active Problem List:     Osteoarthritis of both knees     Osteoarthritis of both shoulders     DJD (degenerative joint disease), lumbosacral     Essential hypertension     Hyperlipidemia     Depression     Obesity     Postlaminectomy syndrome, lumbar region     Shoulder bursitis     S/P lumbar spinal fusion     Encounter for medication monitoring     Chronic pain syndrome     Posterior dislocation of hip ([address]     History of total right hip replacement     Frequent PVCs     Acute cystitis without hematuria     Primary osteoarthritis of both hips     Right hip pain     History of total right hip arthroplasty     Hip instability     Instability of prosthetic hip ([address]

## 2020-02-11 ENCOUNTER — OFFICE VISIT (OUTPATIENT)
Dept: INTERNAL MEDICINE | Age: 60
End: 2020-02-11
Payer: COMMERCIAL

## 2020-02-11 VITALS
WEIGHT: 170 LBS | BODY MASS INDEX: 27.44 KG/M2 | DIASTOLIC BLOOD PRESSURE: 83 MMHG | SYSTOLIC BLOOD PRESSURE: 132 MMHG | HEART RATE: 88 BPM

## 2020-02-11 PROCEDURE — G8417 CALC BMI ABV UP PARAM F/U: HCPCS | Performed by: INTERNAL MEDICINE

## 2020-02-11 PROCEDURE — G8427 DOCREV CUR MEDS BY ELIG CLIN: HCPCS | Performed by: INTERNAL MEDICINE

## 2020-02-11 PROCEDURE — 99211 OFF/OP EST MAY X REQ PHY/QHP: CPT | Performed by: INTERNAL MEDICINE

## 2020-02-11 PROCEDURE — G8484 FLU IMMUNIZE NO ADMIN: HCPCS | Performed by: INTERNAL MEDICINE

## 2020-02-11 PROCEDURE — 3017F COLORECTAL CA SCREEN DOC REV: CPT | Performed by: INTERNAL MEDICINE

## 2020-02-11 PROCEDURE — 99213 OFFICE O/P EST LOW 20 MIN: CPT | Performed by: INTERNAL MEDICINE

## 2020-02-11 PROCEDURE — 4004F PT TOBACCO SCREEN RCVD TLK: CPT | Performed by: INTERNAL MEDICINE

## 2020-02-11 RX ORDER — OMEPRAZOLE 20 MG/1
20 CAPSULE, DELAYED RELEASE ORAL DAILY
Qty: 30 CAPSULE | Refills: 5 | Status: SHIPPED | OUTPATIENT
Start: 2020-02-11 | End: 2020-09-04 | Stop reason: SDUPTHER

## 2020-02-11 RX ORDER — AMLODIPINE BESYLATE 10 MG/1
10 TABLET ORAL DAILY
Qty: 30 TABLET | Refills: 5 | Status: SHIPPED | OUTPATIENT
Start: 2020-02-11 | End: 2020-09-04 | Stop reason: SDUPTHER

## 2020-02-11 RX ORDER — ACETAMINOPHEN 325 MG/1
650 TABLET ORAL EVERY 6 HOURS PRN
Qty: 90 TABLET | Refills: 5 | Status: SHIPPED | OUTPATIENT
Start: 2020-02-11 | End: 2020-09-04 | Stop reason: SDUPTHER

## 2020-02-11 RX ORDER — COLCHICINE 0.6 MG/1
0.6 TABLET ORAL DAILY
Qty: 30 TABLET | Refills: 5 | Status: CANCELLED | OUTPATIENT
Start: 2020-02-11

## 2020-02-11 NOTE — PROGRESS NOTES
Visit Information    Have you changed or started any medications since your last visit including any over-the-counter medicines, vitamins, or herbal medicines? no   Are you having any side effects from any of your medications? -  no  Have you stopped taking any of your medications? Is so, why? -  no    Have you seen any other physician or provider since your last visit? No  Have you had any other diagnostic tests since your last visit? No  Have you been seen in the emergency room and/or had an admission to a hospital since we last saw you? No  Have you had your routine dental cleaning in the past 6 months? no    Have you activated your [employer] account? If not, what are your barriers?  Yes     Patient Care Team:  Corinne Massed, MD as PCP - General (Internal Medicine)  Corinne Massed, MD as PCP - [facility] Provider  Marie Kaufman MD as Consulting Physician (Rheumatology)  Alisia Valera MD as Anesthesiologist (Pain Management)  Jerson Monsivais MD (Orthopedic Surgery)  Oanh Zazueta MD as Surgeon (Orthopedic Surgery)  Karey France MD as Anesthesiologist (Pain Management)    Medical History Review  Past Medical, Family, and Social History reviewed and does contribute to the patient presenting condition    Health Maintenance   Topic Date Due    Pneumococcal 0-64 years Vaccine (1 of 1 - PPSV23) 06/27/1966    DTaP/Tdap/Td vaccine (1 - Tdap) 06/27/1971    Shingles Vaccine (1 of 2) 06/27/2010    Potassium monitoring  10/06/2018    Creatinine monitoring  10/06/2018    Annual Wellness Visit (AWV)  06/04/2019    Flu vaccine (1) 09/01/2019    Breast cancer screen  01/18/2020    Hepatitis C screen  03/03/2020 (Originally 1960)    HIV screen  03/03/2020 (Originally 6/27/1975)    Lipid screen  07/12/2021    Diabetes screen  09/03/2022    Colon cancer screen colonoscopy  12/29/2024    Hepatitis A vaccine  Aged Out    Hepatitis B vaccine  Aged Out    Hib vaccine  Aged Out    Meningococcal (ACWY) vaccine  Aged Out

## 2020-02-11 NOTE — PATIENT INSTRUCTIONS
-Pt due for 6 month f/u in August-- pt to call in July to set up an appt--reminder in [facility] to contact patient as well--AVS given to patient    -Your doctor has ordered an Mammogram for you, please contact our scheduling department at [phone] to set up an appointment.    -Referral to psychiatry placed. You can choose from:     [facility] [address] [place] [address] [phone]. Please call to schedule an appointment. Pamphlets given to pt for various locations.     Angela, CMA

## 2020-03-06 ENCOUNTER — TELEPHONE (OUTPATIENT)
Dept: INTERNAL MEDICINE | Age: 60
End: 2020-03-06

## 2020-03-06 NOTE — LETTER
[address] [address]  Phone: [phone]  Fax: [phone]    Rafita Taylor MD        March 6, 2020    [place]  [address] [id number]      Dear Fanny Epps: We are sending this letter because your PCP ordered [facility] for you to have done at your last visit here and they have not yet been completed. If you can please come to our office on the 2nd floor to  your orders to have them compelted. If you do not have a follow-up appointment scheduled you can either contact the office to make an appointment with us or you can make one when you come in to pick-up your orders. If you have any questions or concerns, please don't hesitate to call.     Sincerely,        Rafita Taylor MD

## 2020-03-09 RX ORDER — COLCHICINE 0.6 MG/1
TABLET ORAL
Qty: 30 TABLET | Refills: 1 | Status: SHIPPED | OUTPATIENT
Start: 2020-03-09 | End: 2020-05-06

## 2020-03-27 RX ORDER — COLCHICINE 0.6 MG/1
TABLET ORAL
Qty: 30 TABLET | Refills: 1 | OUTPATIENT
Start: 2020-03-27

## 2020-05-06 RX ORDER — COLCHICINE 0.6 MG/1
TABLET ORAL
Qty: 30 TABLET | Refills: 1 | Status: SHIPPED | OUTPATIENT
Start: 2020-05-06 | End: 2020-08-06 | Stop reason: SDUPTHER

## 2020-08-03 ENCOUNTER — TELEPHONE (OUTPATIENT)
Dept: PEDIATRICS | Age: 60
End: 2020-08-03

## 2020-08-06 RX ORDER — COLCHICINE 0.6 MG/1
TABLET ORAL
Qty: 30 TABLET | Refills: 1 | Status: SHIPPED | OUTPATIENT
Start: 2020-08-06 | End: 2020-09-22

## 2020-08-21 ENCOUNTER — TELEPHONE (OUTPATIENT)
Dept: INTERNAL MEDICINE | Age: 60
End: 2020-08-21

## 2020-08-21 NOTE — TELEPHONE ENCOUNTER
PC to pt-- LM letting the pt know that their appt on 8/21 will be cancelled and to contact the office to reschedule.

## 2020-09-04 ENCOUNTER — VIRTUAL VISIT (OUTPATIENT)
Dept: INTERNAL MEDICINE | Age: 60
End: 2020-09-04
Payer: COMMERCIAL

## 2020-09-04 PROCEDURE — 99442 PR PHYS/QHP TELEPHONE EVALUATION 11-20 MIN: CPT | Performed by: INTERNAL MEDICINE

## 2020-09-04 RX ORDER — OMEPRAZOLE 20 MG/1
20 CAPSULE, DELAYED RELEASE ORAL DAILY
Qty: 30 CAPSULE | Refills: 5 | Status: SHIPPED | OUTPATIENT
Start: 2020-09-04 | End: 2021-04-14

## 2020-09-04 RX ORDER — M-VIT,TX,IRON,MINS/CALC/FOLIC 27MG-0.4MG
1 TABLET ORAL DAILY
Qty: 30 TABLET | Refills: 5 | Status: SHIPPED | OUTPATIENT
Start: 2020-09-04

## 2020-09-04 RX ORDER — ACETAMINOPHEN 325 MG/1
650 TABLET ORAL EVERY 6 HOURS PRN
Qty: 90 TABLET | Refills: 5 | Status: ON HOLD | OUTPATIENT
Start: 2020-09-04 | End: 2020-11-03 | Stop reason: HOSPADM

## 2020-09-04 RX ORDER — AMLODIPINE BESYLATE 10 MG/1
10 TABLET ORAL DAILY
Qty: 30 TABLET | Refills: 5 | Status: SHIPPED | OUTPATIENT
Start: 2020-09-04 | End: 2021-01-28 | Stop reason: SDUPTHER

## 2020-09-04 NOTE — PROGRESS NOTES
Kaia Partida is a 61 y.o. female evaluated via telephone on 9/4/2020. Consent:  She and/or health care decision maker is aware that that she may receive a bill for this telephone service, depending on her insurance coverage, and has provided verbal consent to proceed: Yes      Documentation:  I communicated with the patient and/or health care decision maker about her chronic health conditions. She still has not done her labs. She has a phobia about physicians and needles. She plans to do it this month. She has been seeing her orthopedic physician Dr. Nazia Cardona. Her hip pain is getting worse and he is told she needs surgery. She is planning on having it done this year. No acute concerns. She says she monitors her blood pressure at home and it is controlled. .   Details of this discussion including any medical advice provided:   Patient has annual wellness visit exam scheduled for next month. I affirm this is a Patient Initiated Episode with a Patient who has not had a related appointment within my department in the past 7 days or scheduled within the next 24 hours.     Patient identification was verified at the start of the visit: Yes    Total Time: minutes: 11-20 minutes    Note: not billable if this call serves to triage the patient into an appointment for the relevant concern      Doroteo Lees

## 2020-09-04 NOTE — PATIENT INSTRUCTIONS
Medications e-scribe to pharmacy of pt's choice. Labs mailed to patient, they will have them done before their next visit. Patient was mailed a mammogram order with instructions. Patient needs to call [phone] to set up the mammogram appointment date and time. Patient was put on a wait list and will be contacted to schedule their next follow up appointment once the schedule is available. If the patient is in need of an appointment before their next visit please call the office at [phone]. After Visit Summary  mailed to patient.     SL

## 2020-09-11 ENCOUNTER — VIRTUAL VISIT (OUTPATIENT)
Dept: INTERNAL MEDICINE | Age: 60
End: 2020-09-11
Payer: COMMERCIAL

## 2020-09-11 PROCEDURE — G0438 PPPS, INITIAL VISIT: HCPCS | Performed by: NURSE PRACTITIONER

## 2020-09-11 ASSESSMENT — PATIENT HEALTH QUESTIONNAIRE - PHQ9
5. POOR APPETITE OR OVEREATING: 0
6. FEELING BAD ABOUT YOURSELF - OR THAT YOU ARE A FAILURE OR HAVE LET YOURSELF OR YOUR FAMILY DOWN: 0
7. TROUBLE CONCENTRATING ON THINGS, SUCH AS READING THE NEWSPAPER OR WATCHING TELEVISION: 0
3. TROUBLE FALLING OR STAYING ASLEEP: 1
SUM OF ALL RESPONSES TO PHQ QUESTIONS 1-9: 8
SUM OF ALL RESPONSES TO PHQ9 QUESTIONS 1 & 2: 6
2. FEELING DOWN, DEPRESSED OR HOPELESS: 3
10. IF YOU CHECKED OFF ANY PROBLEMS, HOW DIFFICULT HAVE THESE PROBLEMS MADE IT FOR YOU TO DO YOUR WORK, TAKE CARE OF THINGS AT HOME, OR GET ALONG WITH OTHER PEOPLE: 0
1. LITTLE INTEREST OR PLEASURE IN DOING THINGS: 3
9. THOUGHTS THAT YOU WOULD BE BETTER OFF DEAD, OR OF HURTING YOURSELF: 0
8. MOVING OR SPEAKING SO SLOWLY THAT OTHER PEOPLE COULD HAVE NOTICED. OR THE OPPOSITE, BEING SO FIGETY OR RESTLESS THAT YOU HAVE BEEN MOVING AROUND A LOT MORE THAN USUAL: 0
4. FEELING TIRED OR HAVING LITTLE ENERGY: 1
SUM OF ALL RESPONSES TO PHQ QUESTIONS 1-9: 8

## 2020-09-11 ASSESSMENT — LIFESTYLE VARIABLES: HOW OFTEN DO YOU HAVE A DRINK CONTAINING ALCOHOL: 0

## 2020-09-11 NOTE — PATIENT INSTRUCTIONS
Personalized Preventive Plan for Marizol Dee - 9/11/2020  Medicare offers a range of preventive health benefits. Some of the tests and screenings are paid in full while other may be subject to a deductible, co-insurance, and/or copay. Some of these benefits include a comprehensive review of your medical history including lifestyle, illnesses that may run in your family, and various assessments and screenings as appropriate. After reviewing your medical record and screening and assessments performed today your provider may have ordered immunizations, labs, imaging, and/or referrals for you. A list of these orders (if applicable) as well as your Preventive Care list are included within your After Visit Summary for your review. Other Preventive Recommendations:    · A preventive eye exam performed by an eye specialist is recommended every 1-2 years to screen for glaucoma; cataracts, macular degeneration, and other eye disorders. · A preventive dental visit is recommended every 6 months. · Try to get at least 150 minutes of exercise per week or 10,000 steps per day on a pedometer . · Order or download the FREE \"Exercise & Physical Activity: Your Everyday Guide\" from [employer]. Call 8-853.430.5362 or search [employer] online. · You need 9447-2539 mg of calcium and 6202-4962 IU of vitamin D per day. It is possible to meet your calcium requirement with diet alone, but a vitamin D supplement is usually necessary to meet this goal.  · When exposed to the sun, use a sunscreen that protects against both UVA and UVB radiation with an SPF of 30 or greater. Reapply every 2 to 3 hours or after sweating, drying off with a towel, or swimming. · Always wear a seat belt when traveling in a car. Always wear a helmet when riding a bicycle or motorcycle. Patient Education        Learning About Benefits From Quitting Smoking  How does quitting smoking make you healthier? If you're thinking about quitting smoking, you may have a few reasons to be smoke-free. Your health may be one of them. When you quit smoking, you lower your risks for cancer, lung disease, heart attack, stroke, blood vessel disease, and blindness from macular degeneration. When you're smoke-free, you get sick less often, and you heal faster. You are less likely to get colds, flu, bronchitis, and pneumonia. As a nonsmoker, you may find that your mood is better and you are less stressed. When and how will you feel healthier? Quitting has real health benefits that start from day 1 of being smoke-free. And the longer you stay smoke-free, the healthier you get and the better you feel. The first hours  After just 20 minutes, your blood pressure and heart rate go down. That means there's less stress on your heart and blood vessels. Within 12 hours, the level of carbon monoxide in your blood drops back to normal. That makes room for more oxygen. With more oxygen in your body, you may notice that you have more energy than when you smoked. After 2 weeks  Your lungs start to work better. Your risk of heart attack starts to drop. After 1 month  When your lungs are clear, you cough less and breathe deeper, so it's easier to be active. Your sense of taste and smell return. That means you can enjoy food more than you have since you started smoking. Over the years  Over the years, your risks of heart disease, heart attack, and stroke are lower. After 10 years, your risk of dying from lung cancer is cut by about half. And your risk for many other types of cancer is lower too. How would quitting help others in your life? When you quit smoking, you improve the health of everyone who now breathes in your smoke. Their heart, lung, and cancer risks drop, much like yours. They are sick less. For babies and small children, living smoke-free means they're less likely to have ear infections, pneumonia, and bronchitis.   If you're a woman who is or will be pregnant someday, quitting smoking means a healthier . Children who are close to you are less likely to become adult smokers. Where can you learn more? Go to https://ronda.healthWinchannel. org and sign in to your [employer] account. Enter [id number] in the [facility] box to learn more about \"Learning About Benefits From Quitting Smoking. \"     If you do not have an account, please click on the \"Sign Up Now\" link. Current as of: 2020               Content Version: 12.5  © 0373-6172 Healthwise, [employer]. Care instructions adapted under license by [facility] ([facility]). If you have questions about a medical condition or this instruction, always ask your healthcare professional. [address] any warranty or liability for your use of this information. Patient Education        Deciding About Using Medicines To Quit Smoking  How can you decide about using medicines to quit smoking? What are the medicines you can use? Your doctor may prescribe varenicline (Chantix) or bupropion (Zyban). These medicines can help you cope with cravings for tobacco. They are pills that don't contain nicotine. You also can use nicotine replacement products. These do contain nicotine. There are many types. Gum and lozenges slowly release nicotine into your mouth. Patches stick to your skin. They slowly release nicotine into your bloodstream.  An inhaler has a kapadia that contains nicotine. You breathe in a puff of nicotine vapor through your mouth and throat. Nasal spray releases a mist that contains nicotine. What are key points about this decision? Using medicines can double your chances of quitting smoking. They can ease cravings and withdrawal symptoms. Getting counseling along with using medicine can raise your chances of quitting even more.   If you smoke fewer than 5 cigarettes a day, you may not need medicines to help you quit smoking. These medicines have less nicotine than cigarettes. And by itself, nicotine is not nearly as harmful as smoking. The tars, carbon monoxide, and other toxic chemicals in tobacco cause the harmful effects. The side effects of nicotine replacement products depend on the type of product. For example, a patch can make your skin red and itchy. Medicines in pill form can make you sick to your stomach. They can also cause dry mouth and trouble sleeping. For most people, the side effects are not bad enough to make them stop using the products. Why might you choose to use medicines to quit smoking? You have tried on your own to stop smoking, but you were not able to stop. You smoke more than 5 cigarettes a day. You want to increase your chances of quitting smoking. You want to reduce your cravings and withdrawal symptoms. You feel the benefits of medicine outweigh the side effects. Why might you choose not to use medicine? You want to try quitting on your own by stopping all at once (\"cold turkey\"). You want to cut back slowly on the number of cigarettes you smoke. You smoke fewer than 5 cigarettes a day. You do not like using medicine. You feel the side effects of medicines outweigh the benefits. You are worried about the cost of medicines. Your decision  Thinking about the facts and your feelings can help you make a decision that is right for you. Be sure you understand the benefits and risks of your options, and think about what else you need to do before you make the decision. Where can you learn more? Go to https://[employer]oby.[employer]. org and sign in to your eTimes[employer].com account. Enter F408 in the [facility] box to learn more about \"Deciding About Using Medicines To Quit Smoking. \"     If you do not have an account, please click on the \"Sign Up Now\" link. Current as of: March 12, 2020               Content Version: 12.5  © 9899-1978 Healthwise, [facility].    Care instructions adapted under license by [facility] ([facility]). If you have questions about a medical condition or this instruction, always ask your healthcare professional. [address] any warranty or liability for your use of this information. Patient Education        Stopping Smoking: Care Instructions  Your Care Instructions     Cigarette smokers crave the nicotine in cigarettes. Giving it up is much harder than simply changing a habit. Your body has to stop craving the nicotine. It is hard to quit, but you can do it. There are many tools that people use to quit smoking. You may find that combining tools works best for you. There are several steps to quitting. First you get ready to quit. Then you get support to help you. After that, you learn new skills and behaviors to become a nonsmoker. For many people, a necessary step is getting and using medicine. Your doctor will help you set up the plan that best meets your needs. You may want to attend a smoking cessation program to help you quit smoking. When you choose a program, look for one that has proven success. Ask your doctor for ideas. You will greatly increase your chances of success if you take medicine as well as get counseling or join a cessation program.  Some of the changes you feel when you first quit tobacco are uncomfortable. Your body will miss the nicotine at first, and you may feel short-tempered and grumpy. You may have trouble sleeping or concentrating. Medicine can help you deal with these symptoms. You may struggle with changing your smoking habits and rituals. The last step is the tricky one: Be prepared for the smoking urge to continue for a time. This is a lot to deal with, but keep at it. You will feel better. Follow-up care is a key part of your treatment and safety. Be sure to make and go to all appointments, and call your doctor if you are having problems.  It's also a good idea to know your test results and keep a stress and anxiety. Some people find hypnosis, acupuncture, and massage helpful for ending the smoking habit. Eat a healthy diet and get regular exercise. Having healthy habits will help your body move past its craving for nicotine. Be prepared to keep trying. Most people are not successful the first few times they try to quit. Do not get mad at yourself if you smoke again. Make a list of things you learned and think about when you want to try again, such as next week, next month, or next year. Where can you learn more? Go to https://Picmonic.[employer]ang. org and sign in to your [employer] account. Enter B701 in the [employer] box to learn more about \"Stopping Smoking: Care Instructions. \"     If you do not have an account, please click on the \"Sign Up Now\" link. Current as of: March 12, 2020               Content Version: 12.5  © 8295-1508 Healthwise, [employer]. Care instructions adapted under license by [facility] ([facility]). If you have questions about a medical condition or this instruction, always ask your healthcare professional. [address] any warranty or liability for your use of this information. Labs mailed to patient, they will have them done before their next visit. Patient was put on a wait list and will be contacted to schedule their next follow up appointment once the schedule is available. If the patient is in need of an appointment before their next visit please call the office at [phone]. After Visit Summary  mailed to patient.     RAYSHAWN

## 2020-09-11 NOTE — PROGRESS NOTES
Medicare Annual Wellness Visit  Are Name: Aurelia Larios Date: 2020   MRN: [id number] Sex: Female   Age: 61 y.o. Ethnicity: Non-/Non    : 1960 Race: Ky Lowe is here for Medicare AWV and Health Maintenance (declined hep c and hiv screening, lab pended, reprinted mamm 20)    Screenings for behavioral, psychosocial and functional/safety risks, and cognitive dysfunction are all negative except as indicated below. These results, as well as other patient data from the [address] form, are documented in Flowsheets linked to this Encounter. Allergies   Allergen Reactions    Asa [Aspirin] Nausea Only    Eggs Or Egg-Derived Products     Ibuprofen Nausea Only    Neurontin [Gabapentin] Nausea Only    Shellfish-Derived Products     Shrimp Flavor      TROUBLE BREATHING    Tape [Adhesive Tape]          Prior to Visit Medications    Medication Sig Taking?  Authorizing Provider   amLODIPine (NORVASC) 10 MG tablet Take 1 tablet by mouth daily Yes Nuno Norwood MD   omeprazole (PRILOSEC) 20 MG delayed release capsule Take 1 capsule by mouth Daily take 1 capsule by mouth once daily 30-60 [facility] Yes Nuno Norwood MD   Multiple Vitamins-Minerals (THERAPEUTIC MULTIVITAMIN-MINERALS) tablet Take 1 tablet by mouth daily Yes Nuno Norwood MD   acetaminophen (TYLENOL) 325 MG tablet Take 2 tablets by mouth every 6 hours as needed for Pain Yes Nuno Norwood MD   colchicine (COLCRYS) 0.6 MG tablet Once daily Yes Nuno Norwood MD         Past Medical History:   Diagnosis Date    CAD (coronary artery disease)     pt denies, CARDIAC CATH O.K., STRESS O.K.    Depression 2013    Fibromyalgia     Full dentures     Hyperlipidemia 2013    Hypertension     Obesity 2013    Osteoarthritis     Pain management     [facility]    Wears glasses        Past Surgical History:   Procedure Laterality Date    COLONOSCOPY      polyps removed    FOOT SURGERY  1968    HYSTERECTOMY      HYSTERECTOMY, TOTAL ABDOMINAL  1997    NERVE BLOCK  2/5/14    caudal #1  celestone 9mg    NERVE BLOCK  2/11/14    caudal #2  celestone 9mg    NERVE BLOCK  02/18/14    caudal# 3, celestone 9 mg    OTHER SURGICAL HISTORY      SPINAL ENDOSCOPY X3    SHOULDER ARTHROSCOPY Left 06/13/15    SHOULDER SURGERY  Right; 2011    SPINE SURGERY  2009    TOTAL HIP ARTHROPLASTY Right 10/12/2016         Family History   Problem Relation Age of Onset    COPD Mother     Coronary Art Dis Father     Cancer Sister     Coronary Art Dis Sister     Cirrhosis Sister     Arthritis Brother     Asthma Brother     Arthritis Sister     Asthma Sister     Cancer Sister     Asthma Sister     Cancer Sister     Asthma Sister     Asthma Brother     Arthritis Brother        CareTeam (Including outside providers/suppliers regularly involved in providing care):   Patient Care Team:  Remi Swanson MD as PCP - General (Internal Medicine)  Remi Swanson MD as PCP - [facility] Empaneled Provider  Musa Pettit MD as Consulting Physician (Rheumatology)  Mercedes Ag MD as Anesthesiologist (Pain Management)  Krys Corcoran MD (Orthopedic Surgery)  Ottoniel Lees MD as Surgeon (Orthopedic Surgery)  Lilo Mckeon MD as Anesthesiologist (Pain Management)    Wt Readings from Last 3 Encounters:   02/11/20 170 lb (77.1 kg)   09/03/19 171 lb (77.6 kg)   06/04/19 174 lb (78.9 kg)      Patient-Reported Vitals 9/4/2020   Patient-Reported Weight 169 lbs   Patient-Reported Height 5 6   Patient-Reported Systolic 091   Patient-Reported Diastolic 94      There is no height or weight on file to calculate BMI. Based upon direct observation of the patient, evaluation of cognition reveals recent and remote memory intact.     Wt Readings from Last 3 Encounters:   02/11/20 170 lb (77.1 kg)   09/03/19 171 lb (77.6 kg)   06/04/19 174 lb (78.9 kg)     Temp Readings from Last 3 Encounters:   03/16/18 99.1 °F (37.3 °C) (Oral)   09/05/17 98.1 °F (36.7 °C) (Oral)   08/09/17 98 °F (36.7 °C)     BP Readings from Last 3 Encounters:   02/11/20 132/83   09/03/19 133/82   06/04/19 (!) 139/90     Pulse Readings from Last 3 Encounters:   02/11/20 88   09/03/19 84   06/04/19 102       Patient's complete Health Risk Assessment and screening values have been reviewed and are found in Flowsheets. The following problems were reviewed today and where indicated follow up appointments were made and/or referrals ordered. Positive Risk Factor Screenings with Interventions:     Cognitive: Words recalled: 2 Words Recalled  Clock Drawing Test (CDT) Score: (jud)  Total Score Interpretation: Positive Mini-Cog  Cognitive Impairment Interventions:  · Patient declines any further evaluation/treatment for cognitive impairment    Depression:  PHQ-2 Score: 6  PHQ-9 Total Score: 8    Severity:1-4 = minimal depression, 5-9 = mild depression, 10-14 = moderate depression, 15-19 = moderately severe depression, 20-27 = severe depression  Depression Interventions:  · Patient declines any further evaluation/treatment for this issue    Substance History:  Social History     Tobacco History     Smoking Status  Current Every Day Smoker Smoking Frequency  0.25 packs/day for 25 years (6.25 pk yrs) Smoking Tobacco Type  Cigarettes, E-Cigarettes    Smokeless Tobacco Use  Never Used    Tobacco Comment  e-cigarette currently with occasional hit off a cigarette          Alcohol History     Alcohol Use Status  Yes Drinks/Week  0 Standard drinks or equivalent per week Amount  0.0 standard drinks of alcohol/wk Comment  occasionally          Drug Use     Drug Use Status  No Comment  crack YRS AGO          Sexual Activity     Sexually Active  Yes               Alcohol Screening:       A score of 8 or more is associated with harmful or hazardous drinking.  A score of 13 or more in women, and 15 or more in men, is likely to indicate alcohol dependence. Substance Abuse Interventions:  · Tobacco abuse:  tobacco cessation tips and resources provided    General Health:  General  In the past 7 days, have you experienced any of the following? New or Increased Pain, New or Increased Fatigue, Loneliness, Social Isolation, Stress or Anger?: (!) Stress(hip pain)  Do you get the social and emotional support that you need?: Yes  Do you have a Living Will?: (!) No  General Health Risk Interventions:  · No Living Will: patient declined    Health Habits/Nutrition:  Health Habits/Nutrition  Do you exercise for at least 20 minutes 2-3 times per week?: (!) No  Have you lost any weight without trying in the past 3 months?: No  Do you eat fewer than 2 meals per day?: No  Have you seen a dentist within the past year?: (!) No(dentures)  There is no height or weight on file to calculate BMI. Health Habits/Nutrition Interventions:  · Inadequate physical activity:  patient is not ready to increase his/her physical activity level at this time  · Dental exam overdue:  clinic info sent with [facility]    Personalized Preventive Plan   Current Health Maintenance Status    There is no immunization history on file for this patient.      Health Maintenance   Topic Date Due    Hepatitis C screen  1960    Pneumococcal 0-64 years Vaccine (1 of 1 - PPSV23) 06/27/1966    HIV screen  06/27/1975    Potassium monitoring  10/06/2018    Creatinine monitoring  10/06/2018    Annual Wellness Visit (AWV)  06/04/2019    Breast cancer screen  01/18/2020    Flu vaccine (1) 09/01/2020    DTaP/Tdap/Td vaccine (1 - Tdap) 02/11/2021 (Originally 6/27/1979)    Shingles Vaccine (1 of 2) 02/11/2021 (Originally 6/27/2010)    Lipid screen  07/12/2021    Colon cancer screen colonoscopy  12/29/2024    Hepatitis A vaccine  Aged Out    Hepatitis B vaccine  Aged Out    Hib vaccine  Aged Out    Meningococcal (ACWY) vaccine  Aged Out     Recommendations for [place] Due: see orders and patient instructions/AVS.  . Recommended screening schedule for the next 5-10 years is provided to the patient in written form: see Patient Instructions/AVS.    Cailin Ro was seen today for medicare awv and health maintenance. Diagnoses and all orders for this visit:    Routine general medical examination at a health care facility  -     Basic Metabolic Panel; Future  -     [place] DEPRESSION SCREEN ANNUAL              Jimmy Hodge is a 61 y.o. female being evaluated by a Virtual Visit (phone) encounter to address concerns as mentioned above. A caregiver was present when appropriate. Due to this being a TeleHealth encounter (During XOFVE-29 public health emergency), evaluation of the following organ systems was limited: Vitals/Constitutional/EENT/Resp/CV/GI//MS/Neuro/Skin/Heme-Lymph-Imm. Pursuant to the emergency declaration under the [address] [place] [address], [address] [place] [address] authority and the [employer] and [employer] Act, this Virtual Visit was conducted with patient's (and/or legal guardian's) consent, to reduce the patient's risk of exposure to COVID-19 and provide necessary medical care. The patient (and/or legal guardian) has also been advised to contact this office for worsening conditions or problems, and seek emergency medical treatment and/or call 911 if deemed necessary. Patient identification was verified at the start of the visit: Yes    Services were provided through phone to substitute for in-person clinic visit. Patient and provider were located at their individual homes. --MARILYN Arellano CNP on 9/11/2020 at 10:04 AM    An electronic signature was used to authenticate this note.

## 2020-09-22 RX ORDER — COLCHICINE 0.6 MG/1
TABLET ORAL
Qty: 30 TABLET | Refills: 1 | Status: SHIPPED | OUTPATIENT
Start: 2020-09-22 | End: 2020-11-17

## 2020-10-13 ENCOUNTER — HOSPITAL ENCOUNTER (OUTPATIENT)
Age: 60
Setting detail: SPECIMEN
Discharge: HOME | End: 2020-10-13
Payer: COMMERCIAL

## 2020-10-13 ENCOUNTER — OFFICE VISIT (OUTPATIENT)
Dept: INTERNAL MEDICINE | Age: 60
End: 2020-10-13
Payer: COMMERCIAL

## 2020-10-13 VITALS
BODY MASS INDEX: 28.12 KG/M2 | WEIGHT: 175 LBS | HEART RATE: 87 BPM | HEIGHT: 66 IN | SYSTOLIC BLOOD PRESSURE: 120 MMHG | DIASTOLIC BLOOD PRESSURE: 81 MMHG

## 2020-10-13 LAB
ANION GAP SERPL CALCULATED.3IONS-SCNC: 13 MMOL/L (ref 9–17)
BUN BLDV-MCNC: 12 MG/DL (ref 8–23)
BUN/CREAT BLD: NORMAL (ref 9–20)
CALCIUM SERPL-MCNC: 9.8 MG/DL (ref 8.6–10.4)
CHLORIDE BLD-SCNC: 105 MMOL/L (ref 98–107)
CO2: 25 MMOL/L (ref 20–31)
CREAT SERPL-MCNC: 0.71 MG/DL (ref 0.5–0.9)
ESTIMATED AVERAGE GLUCOSE: 134 MG/DL
GFR AFRICAN AMERICAN: >60 ML/MIN
GFR NON-AFRICAN AMERICAN: >60 ML/MIN
GFR SERPL CREATININE-BSD FRML MDRD: NORMAL ML/MIN/{1.73_M2}
GFR SERPL CREATININE-BSD FRML MDRD: NORMAL ML/MIN/{1.73_M2}
GLUCOSE BLD-MCNC: 99 MG/DL (ref 70–99)
HBA1C MFR BLD: 6.3 % (ref 4–6)
HCT VFR BLD CALC: 41.6 % (ref 36.3–47.1)
HEMOGLOBIN: 13 G/DL (ref 11.9–15.1)
MCH RBC QN AUTO: 26.9 PG (ref 25.2–33.5)
MCHC RBC AUTO-ENTMCNC: 31.3 G/DL (ref 28.4–34.8)
MCV RBC AUTO: 86.1 FL (ref 82.6–102.9)
NRBC AUTOMATED: 0 PER 100 WBC
PDW BLD-RTO: 14.7 % (ref 11.8–14.4)
PLATELET # BLD: 273 K/UL (ref 138–453)
PMV BLD AUTO: 11.6 FL (ref 8.1–13.5)
POTASSIUM SERPL-SCNC: 4 MMOL/L (ref 3.7–5.3)
RBC # BLD: 4.83 M/UL (ref 3.95–5.11)
SODIUM BLD-SCNC: 143 MMOL/L (ref 135–144)
THYROXINE, FREE: 1.29 NG/DL (ref 0.93–1.7)
TSH SERPL DL<=0.05 MIU/L-ACNC: 0.13 MIU/L (ref 0.3–5)
URIC ACID: 5.8 MG/DL (ref 2.4–5.7)
WBC # BLD: 6.4 K/UL (ref 3.5–11.3)

## 2020-10-13 PROCEDURE — 3017F COLORECTAL CA SCREEN DOC REV: CPT | Performed by: INTERNAL MEDICINE

## 2020-10-13 PROCEDURE — 84443 ASSAY THYROID STIM HORMONE: CPT

## 2020-10-13 PROCEDURE — 36415 COLL VENOUS BLD VENIPUNCTURE: CPT

## 2020-10-13 PROCEDURE — 83036 HEMOGLOBIN GLYCOSYLATED A1C: CPT

## 2020-10-13 PROCEDURE — 85027 COMPLETE CBC AUTOMATED: CPT

## 2020-10-13 PROCEDURE — 4004F PT TOBACCO SCREEN RCVD TLK: CPT | Performed by: INTERNAL MEDICINE

## 2020-10-13 PROCEDURE — 99214 OFFICE O/P EST MOD 30 MIN: CPT | Performed by: INTERNAL MEDICINE

## 2020-10-13 PROCEDURE — 84550 ASSAY OF BLOOD/URIC ACID: CPT

## 2020-10-13 PROCEDURE — 99211 OFF/OP EST MAY X REQ PHY/QHP: CPT | Performed by: INTERNAL MEDICINE

## 2020-10-13 PROCEDURE — G8417 CALC BMI ABV UP PARAM F/U: HCPCS | Performed by: INTERNAL MEDICINE

## 2020-10-13 PROCEDURE — 80048 BASIC METABOLIC PNL TOTAL CA: CPT

## 2020-10-13 PROCEDURE — G8427 DOCREV CUR MEDS BY ELIG CLIN: HCPCS | Performed by: INTERNAL MEDICINE

## 2020-10-13 PROCEDURE — G8484 FLU IMMUNIZE NO ADMIN: HCPCS | Performed by: INTERNAL MEDICINE

## 2020-10-13 PROCEDURE — 84439 ASSAY OF FREE THYROXINE: CPT

## 2020-10-13 ASSESSMENT — ENCOUNTER SYMPTOMS
EYE REDNESS: 0
SHORTNESS OF BREATH: 0
BACK PAIN: 0
SINUS PRESSURE: 0
DIARRHEA: 0
ABDOMINAL PAIN: 0
WHEEZING: 0
COUGH: 0
CONSTIPATION: 0
RHINORRHEA: 0

## 2020-10-13 NOTE — PATIENT INSTRUCTIONS
-Pt due for 3 month f/u in January-- pt to call in Deember  to set up an appt--reminder in [facility] to contact patient as well--AVS given to patient    -Bloodwork orders given to patient, they will have them done before their next visit.     -Surgical Clearnace completed and faxed to Dr Guadalupe Ochoa office    -KARMEN Lee

## 2020-10-13 NOTE — PROGRESS NOTES
[facility]    Progress Note    Date of patient's visit: 10/13/2020    Patient's Name:  Barbara Angel    YOB: 1960            Patient Care Team:  Lesli Dawn MD as PCP - General (Internal Medicine)  Lesli Dawn MD as PCP - [facility]  Radha Gomez MD as Consulting Physician (Rheumatology)  Rochelle Herrera MD as Anesthesiologist (Pain Management)  Radha Ireland MD (Orthopedic Surgery)  Ann Marie Duenas MD as Surgeon (Orthopedic Surgery)  Jo Ann Ramirez MD as Anesthesiologist (Pain Management)    REASON FOR VISIT: Routine outpatient follow     Chief Complaint   Patient presents with    Pre-op Exam     Pt scheduled to have Total L Hip arthoplasty     Health Maintenance         HISTORY OF PRESENT ILLNESS:    History was obtained from the patient. Barbara Angel is a 61 y.o. is here for evaluation prior to left total hip arthroplasty. That is going to be done next month. She has had a previous right hip surgery and has a prosthetic joint. The surgery was done 3 years ago and she had no problems perioperatively. She has no history of insulin-dependent diabetes, heart disease or strokes. She is a smoker. She has hypertension and impaired glucose tolerance. Labs are pending. She is a smoker and smokes about quarter pack a day. She is smoked on and off for 25 years. She smokes marijuana. No other drug use or alcohol abuse. She has occasional cough but denies any significant shortness of breath or cough for sputum production on a daily basis. No chest pain. She is active but cannot do heavy lifting or climbing too many stairs because of her hip issues. But she is able to complete her day-to-day tasks and is able to ambulate around stores. No leg edema. No orthopnea. Stress test 5 6 years ago was normal.  No recent fevers or coughs or cold. No urinary symptoms.   She has joint pain in several joints including her shoulders and left knee. Past Medical History:   Diagnosis Date    CAD (coronary artery disease)     pt denies, CARDIAC CATH O.K., STRESS O.K.    Depression 7/30/2013    Fibromyalgia     Full dentures     Hyperlipidemia 7/30/2013    Hypertension     Obesity 7/30/2013    Osteoarthritis     Pain management     sees [facility] pain clinic    Wears glasses        Past Surgical History:   Procedure Laterality Date    COLONOSCOPY      polyps removed    FOOT SURGERY  1968    HYSTERECTOMY      HYSTERECTOMY, TOTAL ABDOMINAL  1997    NERVE BLOCK  2/5/14    caudal #1  celestone 9mg    NERVE BLOCK  2/11/14    caudal #2  celestone 9mg    NERVE BLOCK  02/18/14    caudal# 3, celestone 9 mg    OTHER SURGICAL HISTORY      SPINAL ENDOSCOPY X3    SHOULDER ARTHROSCOPY Left 06/13/15    SHOULDER SURGERY  Right; 2011    SPINE SURGERY  2009    TOTAL HIP ARTHROPLASTY Right 10/12/2016         ALLERGIES      Allergies   Allergen Reactions    Asa [Aspirin] Nausea Only    Eggs Or Egg-Derived Products     Ibuprofen Nausea Only    Neurontin [Gabapentin] Nausea Only    Shellfish-Derived Products     Shrimp Flavor      TROUBLE BREATHING    Tape [Adhesive Tape]        MEDICATIONS:      Current Outpatient Medications on File Prior to Visit   Medication Sig Dispense Refill    colchicine (COLCRYS) 0.6 MG tablet take 1 tablet by mouth once daily 30 tablet 1    amLODIPine (NORVASC) 10 MG tablet Take 1 tablet by mouth daily 30 tablet 5    omeprazole (PRILOSEC) 20 MG delayed release capsule Take 1 capsule by mouth Daily take 1 capsule by mouth once daily 30-60 MINUTES BEFORE THE FIRST MEAL OF THE DAY 30 capsule 5    Multiple Vitamins-Minerals (THERAPEUTIC MULTIVITAMIN-MINERALS) tablet Take 1 tablet by mouth daily 30 tablet 5    acetaminophen (TYLENOL) 325 MG tablet Take 2 tablets by mouth every 6 hours as needed for Pain 90 tablet 5     No current facility-administered medications on file prior to visit. SOCIAL HISTORY    Reviewed and no change from previous record. Tano Rios  reports that she has been smoking cigarettes and e-cigarettes. She has a 6.25 pack-year smoking history. She has never used smokeless tobacco.    FAMILY HISTORY:    Reviewed and No change from previous visit    HEALTH MAINTENANCE DUE:      Health Maintenance Due   Topic Date Due    Potassium monitoring  10/06/2018    Creatinine monitoring  10/06/2018    Breast cancer screen  01/18/2020       REVIEW OF SYSTEMS:    12 point review of symptoms completed and found to be normal except noted in the HPI    Review of Systems   Constitutional: Negative for unexpected weight change. HENT: Positive for congestion. Negative for rhinorrhea, sinus pressure and sneezing. Eyes: Negative for redness and visual disturbance. Respiratory: Negative for cough, shortness of breath and wheezing. Cardiovascular: Negative for chest pain, palpitations and leg swelling. Gastrointestinal: Negative for abdominal pain, constipation and diarrhea. Endocrine: Negative for polydipsia and polyuria. Genitourinary: Negative for dysuria, flank pain, hematuria and urgency. Musculoskeletal: Positive for arthralgias. Negative for back pain. Allergic/Immunologic: Positive for environmental allergies. Negative for immunocompromised state. Neurological: Negative for dizziness, syncope, weakness, numbness and headaches. Hematological: Negative for adenopathy. Does not bruise/bleed easily. Psychiatric/Behavioral: Negative for dysphoric mood. The patient is nervous/anxious. PHYSICAL EXAM:     Vitals:    10/13/20 1058   BP: 120/81   Site: Right Upper Arm   Position: Sitting   Cuff Size: Medium Adult   Pulse: 87   Weight: 175 lb (79.4 kg)   Height: 5' 6\" (1.676 m)     Body mass index is 28.25 kg/m².     BP Readings from Last 3 Encounters:   10/13/20 120/81   02/11/20 132/83   09/03/19 133/82        Wt Readings from Last 3 Encounters:   10/13/20 175 lb (79.4 kg)   02/11/20 170 lb (77.1 kg)   09/03/19 171 lb (77.6 kg)       Physical Exam  Vitals signs and nursing note reviewed. Constitutional:       Appearance: Normal appearance. HENT:      Head: Normocephalic and atraumatic. Eyes:      General:         Left eye: No discharge. Extraocular Movements: Extraocular movements intact. Conjunctiva/sclera: Conjunctivae normal.      Pupils: Pupils are equal, round, and reactive to light. Neck:      Musculoskeletal: Normal range of motion and neck supple. Cardiovascular:      Rate and Rhythm: Normal rate and regular rhythm. Heart sounds: No murmur. Pulmonary:      Effort: Pulmonary effort is normal.      Breath sounds: Normal breath sounds. No wheezing. Musculoskeletal:      Right lower leg: No edema. Left lower leg: No edema. Skin:     General: Skin is warm and dry. Findings: No rash. Neurological:      General: No focal deficit present. Mental Status: She is alert and oriented to person, place, and time.                LABORATORY FINDINGS:    CBC:  Lab Results   Component Value Date    WBC 6.0 10/06/2017    HGB 11.7 10/06/2017     10/06/2017     BMP:    Lab Results   Component Value Date     10/06/2017    K 4.1 10/06/2017     10/06/2017    CO2 27 10/06/2017    BUN 13 10/06/2017    CREATININE 0.83 10/06/2017    GLUCOSE 102 10/06/2017     HEMOGLOBIN A1C:   Lab Results   Component Value Date    LABA1C 5.6 09/03/2019     MICROALBUMIN URINE:   Lab Results   Component Value Date    MICROALBUR 31 08/18/2017     FASTING LIPID [email or web address]  Lab Results   Component Value Date    LDLCHOLESTEROL 133 (H) 07/12/2016       LIVER PROFILE:  Lab Results   Component Value Date    ALT 10 10/06/2017    AST 13 10/06/2017    PROT 7.0 10/06/2017    BILITOT 0.30 10/06/2017    LABALBU 3.8 10/06/2017      THYROID FUNCTION:   Lab Results   Component Value Date    TSH 0.48 11/21/2016 URINEANALYSIS: No results found for: LABURIN  ASSESSMENT AND PLAN:    1. Essential hypertension  Same medications. Labs pending.    - EKG 12 lead; Future    2. IGT (impaired glucose tolerance)  Advised to get A1c and BMP done. 3. Primary osteoarthritis of both hips  Patient stable for surgery. Needs EKG perioperatively along with labs. - EKG 12 lead; Future    4. Preop examination    - EKG 12 lead; Future    5. Smoker  Discussed smoking cessation which she says she will try. FOLLOW UP AND INSTRUCTIONS:   Return in about 3 months (around 1/13/2021). 1. Felicita received counseling on the following healthy behaviors: nutrition, exercise and medication adherence    2. Reviewed prior labs and health maintenance. 3. Discussed use, benefit, and side effects of prescribed medications. Barriers to medication compliance addressed. All patient questions answered. Pt voiced understanding.          Michaela Mayorga  Attending Physician, [address] [place] [address], Internal Medicine Residency Program  [address] [place] [address]  10/13/2020, 11:16 AM

## 2020-10-15 ENCOUNTER — HOSPITAL ENCOUNTER (OUTPATIENT)
Age: 60
Discharge: HOME | End: 2020-10-15
Payer: COMMERCIAL

## 2020-10-15 ENCOUNTER — HOSPITAL ENCOUNTER (OUTPATIENT)
Dept: PREADMISSION TESTING | Age: 60
Discharge: HOME | End: 2020-10-19
Payer: COMMERCIAL

## 2020-10-15 VITALS
HEART RATE: 76 BPM | DIASTOLIC BLOOD PRESSURE: 71 MMHG | SYSTOLIC BLOOD PRESSURE: 132 MMHG | HEIGHT: 66 IN | BODY MASS INDEX: 27.92 KG/M2 | TEMPERATURE: 96.8 F | OXYGEN SATURATION: 100 % | WEIGHT: 173.72 LBS | RESPIRATION RATE: 16 BRPM

## 2020-10-15 LAB
-: ABNORMAL
ABO/RH: NORMAL
AMORPHOUS: ABNORMAL
ANTIBODY SCREEN: NEGATIVE
ARM BAND NUMBER: NORMAL
BACTERIA: ABNORMAL
BILIRUBIN URINE: NEGATIVE
CASTS UA: ABNORMAL /LPF
CHOLESTEROL/HDL RATIO: 3.3
CHOLESTEROL: 224 MG/DL
COLOR: YELLOW
COMMENT UA: ABNORMAL
CRYSTALS, UA: ABNORMAL /HPF
EPITHELIAL CELLS UA: ABNORMAL /HPF (ref 0–5)
EXPIRATION DATE: NORMAL
GLUCOSE URINE: NEGATIVE
HDLC SERPL-MCNC: 68 MG/DL
KETONES, URINE: NEGATIVE
LDL CHOLESTEROL: 134 MG/DL (ref 0–130)
LEUKOCYTE ESTERASE, URINE: ABNORMAL
MUCUS: ABNORMAL
NITRITE, URINE: NEGATIVE
OTHER OBSERVATIONS UA: ABNORMAL
PH UA: 7 (ref 5–8)
PROTEIN UA: ABNORMAL
RBC UA: ABNORMAL /HPF (ref 0–2)
RENAL EPITHELIAL, UA: ABNORMAL /HPF
SEDIMENTATION RATE, ERYTHROCYTE: 19 MM (ref 0–30)
SPECIFIC GRAVITY UA: 1.02 (ref 1–1.03)
TRICHOMONAS: POSITIVE
TRIGL SERPL-MCNC: 109 MG/DL
TURBIDITY: ABNORMAL
URINE HGB: ABNORMAL
UROBILINOGEN, URINE: NORMAL
VLDLC SERPL CALC-MCNC: ABNORMAL MG/DL (ref 1–30)
WBC UA: ABNORMAL /HPF (ref 0–5)
YEAST: ABNORMAL

## 2020-10-15 PROCEDURE — 87641 MR-STAPH DNA AMP PROBE: CPT

## 2020-10-15 PROCEDURE — 86850 RBC ANTIBODY SCREEN: CPT

## 2020-10-15 PROCEDURE — 86900 BLOOD TYPING SEROLOGIC ABO: CPT

## 2020-10-15 PROCEDURE — 85652 RBC SED RATE AUTOMATED: CPT

## 2020-10-15 PROCEDURE — 93005 ELECTROCARDIOGRAM TRACING: CPT | Performed by: ORTHOPAEDIC SURGERY

## 2020-10-15 PROCEDURE — 36415 COLL VENOUS BLD VENIPUNCTURE: CPT

## 2020-10-15 PROCEDURE — 87086 URINE CULTURE/COLONY COUNT: CPT

## 2020-10-15 PROCEDURE — 87186 SC STD MICRODIL/AGAR DIL: CPT

## 2020-10-15 PROCEDURE — 81001 URINALYSIS AUTO W/SCOPE: CPT

## 2020-10-15 PROCEDURE — 86901 BLOOD TYPING SEROLOGIC RH(D): CPT

## 2020-10-15 PROCEDURE — 87077 CULTURE AEROBIC IDENTIFY: CPT

## 2020-10-15 PROCEDURE — 80061 LIPID PANEL: CPT

## 2020-10-15 RX ORDER — ACETAMINOPHEN 500 MG
1000 TABLET ORAL ONCE
Status: CANCELLED | OUTPATIENT
Start: 2020-11-03

## 2020-10-15 RX ORDER — CELECOXIB 200 MG/1
200 CAPSULE ORAL ONCE
Status: CANCELLED | OUTPATIENT
Start: 2020-11-03

## 2020-10-15 RX ORDER — GABAPENTIN 300 MG/1
300 CAPSULE ORAL ONCE
Status: CANCELLED | OUTPATIENT
Start: 2020-11-03

## 2020-10-15 ASSESSMENT — PROMIS GLOBAL HEALTH SCALE
IN THE PAST 7 DAYS, HOW WOULD YOU RATE YOUR PAIN ON AVERAGE [ON A SCALE FROM 0 (NO PAIN) TO 10 (WORST IMAGINABLE PAIN)]?: 7
IN GENERAL, HOW WOULD YOU RATE YOUR SATISFACTION WITH YOUR SOCIAL ACTIVITIES AND RELATIONSHIPS [ON A SCALE OF 1 (POOR) TO 5 (EXCELLENT)]?: 3
HOW IS THE PROMIS V1.1 BEING ADMINISTERED?: 0
IN GENERAL, HOW WOULD YOU RATE YOUR PHYSICAL HEALTH [ON A SCALE OF 1 (POOR) TO 5 (EXCELLENT)]?: 2
SUM OF RESPONSES TO QUESTIONS 3, 6, 7, & 8: 14
IN GENERAL, HOW WOULD YOU RATE YOUR MENTAL HEALTH, INCLUDING YOUR MOOD AND YOUR ABILITY TO THINK [ON A SCALE OF 1 (POOR) TO 5 (EXCELLENT)]?: 3
IN THE PAST 7 DAYS, HOW OFTEN HAVE YOU BEEN BOTHERED BY EMOTIONAL PROBLEMS, SUCH AS FEELING ANXIOUS, DEPRESSED, OR IRRITABLE [ON A SCALE FROM 1 (NEVER) TO 5 (ALWAYS)]?: 3
WHO IS THE PERSON COMPLETING THE PROMIS V1.1 SURVEY?: 0
IN GENERAL, WOULD YOU SAY YOUR HEALTH IS...[ON A SCALE OF 1 (POOR) TO 5 (EXCELLENT)]: 2
IN GENERAL, PLEASE RATE HOW WELL YOU CARRY OUT YOUR USUAL SOCIAL ACTIVITIES (INCLUDES ACTIVITIES AT HOME, AT WORK, AND IN YOUR COMMUNITY, AND RESPONSIBILITIES AS A PARENT, CHILD, SPOUSE, EMPLOYEE, FRIEND, ETC) [ON A SCALE OF 1 (POOR) TO 5 (EXCELLENT)]?: 3
SUM OF RESPONSES TO QUESTIONS 2, 4, 5, & 10: 11
TO WHAT EXTENT ARE YOU ABLE TO CARRY OUT YOUR EVERYDAY PHYSICAL ACTIVITIES SUCH AS WALKING, CLIMBING STAIRS, CARRYING GROCERIES, OR MOVING A CHAIR [ON A SCALE OF 1 (NOT AT ALL) TO 5 (COMPLETELY)]?: 2
IN THE PAST 7 DAYS, HOW WOULD YOU RATE YOUR FATIGUE ON AVERAGE [ON A SCALE FROM 1 (NONE) TO 5 (VERY SEVERE)]?: 3
IN GENERAL, WOULD YOU SAY YOUR QUALITY OF LIFE IS...[ON A SCALE OF 1 (POOR) TO 5 (EXCELLENT)]: 2

## 2020-10-15 ASSESSMENT — PAIN DESCRIPTION - LOCATION: LOCATION: HIP

## 2020-10-15 ASSESSMENT — PAIN DESCRIPTION - PAIN TYPE: TYPE: CHRONIC PAIN

## 2020-10-15 ASSESSMENT — PAIN SCALES - GENERAL: PAINLEVEL_OUTOF10: 7

## 2020-10-15 ASSESSMENT — HOOS JR
HOOS JR RAW SCORE: 12
GOING UP OR DOWN STAIRS: 0
WALKING ON UNEVEN SURFACE: 1
SITTING: 3
LYING IN BED (TURNING OVER, MAINTAINING HIP POSITION): 3
BENDING TO THE FLOOR TO PICK UP OBJECT: 3
RISING FROM SITTING: 2

## 2020-10-15 ASSESSMENT — PAIN DESCRIPTION - FREQUENCY: FREQUENCY: CONTINUOUS

## 2020-10-15 ASSESSMENT — PAIN DESCRIPTION - ORIENTATION: ORIENTATION: LEFT

## 2020-10-15 ASSESSMENT — PAIN DESCRIPTION - PROGRESSION: CLINICAL_PROGRESSION: GRADUALLY WORSENING

## 2020-10-15 ASSESSMENT — PAIN DESCRIPTION - DESCRIPTORS: DESCRIPTORS: CONSTANT;ACHING

## 2020-10-15 ASSESSMENT — PAIN - FUNCTIONAL ASSESSMENT: PAIN_FUNCTIONAL_ASSESSMENT: PREVENTS OR INTERFERES SOME ACTIVE ACTIVITIES AND ADLS

## 2020-10-15 NOTE — PRE-PROCEDURE INSTRUCTIONS
[facility] ON Tuesday, November 3 at 5:30 AM    When you arrive at the hospital the day of surgery, have your  pull up to the main entrance and call pre-op at [phone]    Continue to take your home medications as you normally do up to and including the night before surgery with the exception of any blood thinning medications. Please stop any blood thinning medications as directed by your surgeon or prescribing physician. Failure to stop certain medications may interfere with your scheduled surgery. These may include:  Aspirin, Warfarin (Coumadin), Clopidogrel (Plavix), Ibuprofen (Motrin, Advil), Naproxen (Aleve), Meloxicam (Mobic), Celecoxib (Celebrex), Eliquis, Pradaxa, Xarelto, Effient, Fish Oil, Herbal supplements. If you are diabetic, do not take any of your diabetic medications by mouth the morning of surgery. If you are taking insulin contact the doctor that manages your diabetes for instructions about any changes to your insulin dosages the day before surgery. Do not inject insulin or other injectable diabetic medications the morning of surgery unless otherwise instructed by the doctor who manages your diabetes. Please take the following medication(s) the day of surgery with a small sip of water:  Amlodipine, omeprazole  Please use your inhalers at home the day of surgery. PREPARING FOR YOUR SURGERY:     Before surgery, you can play an important role in your own health. Because skin is not sterile, we need to be sure that your skin is as free of germs as possible before surgery by carefully washing before surgery. Preparing or prepping skin before surgery can reduce the risk of a surgical site infection.   Do not shave the area of your body where your surgery will be performed unless you received specific permission from your physician.     You will need to shower at home the night before surgery and the morning of surgery with a special soap called chlorhexidine gluconate (CHG*). *Not to be used by people allergic to Chlorhexidine Gluconate (CHG). Following these instructions will help you be sure that your skin is clean before surgery. Instructions on cleaning your skin before surgery: The night before your surgery:      You will need to shower with warm water (not hot) and the CHG soap.  Use a clean wash cloth and a clean towel. Have clean clothes available to put on after the shower.    First wash your hair with regular shampoo. Rinse your hair and body thoroughly to remove the shampoo. [facility] Wash your face with your regular soap or water only. Thoroughly rinse your body with warm water from the neck down.  Turn water off to prevent rinsing the soap off too soon.  With a clean wet washcloth and half of the CHG soap in the bottle, lather your entire body from the neck down. Do not use CHG soap near your eyes or ears to avoid injury to those areas.  Wash thoroughly, paying special attention to the area where your surgery will be performed.  Wash your body gently for five (5) minutes. Avoid scrubbing your skin too hard.  Turn the water back on and rinse your body thoroughly.  Pat yourself dry with a clean, soft towel. Do not apply lotion, cream or powder.  Dress with clean freshly washed clothes. The morning of surgery:     Repeat shower following steps above - using remaining half of CHG soap in bottle. Patient Instructions:    [facility] If you are having any type of anesthesia you are to have nothing to eat or drink after midnight the night before your surgery. This includes gum, mints, water or smoking or chewing tobacco.  The only exception to this is a small sip of water to take with any morning dose of heart, blood pressure, or seizure medications. No alcoholic beverages for 24 hours prior to surgery.      Bring a list of all medications you take, along with the dose of the medications and how often you take it. If more convenient bring the pharmacy bottles in a zip lock bag.  Brush your teeth but do not swallow water.  Bring your eyeglasses and case with you. No contacts are to be worn the day of surgery. You also may bring your hearing aids. Most surgical procedures involving anesthesia will require that you remove your dentures prior to surgery.  If you are on C-PAP or Bi-PAP at home and plan on staying in the hospital overnight for your surgery please bring the machine with you. · Do not wear any jewelry or body piercings day of surgery. Also, NO lotion, perfume or deodorant to be used the day of surgery. No nail polish on the operative extremity (arm/leg surgeries)    ·   If you are staying overnight with us, please bring a small bag of personal items.  Please wear loose, comfortable clothing. If you are potentially going to have a cast or brace bring clothing that will fit over them.  In case of illness - If you have cold or flu like symptoms (high fever, runny nose, sore throat, cough, etc.) rash, nausea, vomiting, loose stools, and/or recent contact with someone who has a contagious disease (chicken pox, measles, etc.) Please call your doctor before coming to the hospital.     If your child is having surgery please make arrangements for any other children to be cared for at home on the day of surgery. Other children are not permitted in recovery room and we want you to be able to spend time with the patient. If other arrangements are not available then we suggest that you have a second adult to stay in the waiting room. Day of Surgery/Procedure:    As a patient at [facility] you can expect quality medical and nursing care that is centered on your individual needs.   Our goal is to make your surgical experience as comfortable as possible    . Transportation After Your Surgery/Procedure: You will need a friend or family member to drive you home after your procedure. Your  must be 25years of age or older and able to sign off on your discharge instructions. A taxi cab or any other form of public transportation is not acceptable. Your friend or family member must stay at the hospital throughout your procedure. Someone must remain with you for the first 24 hours after your surgery if you receive anesthesia or medication. If you do not have someone to stay with you, your procedure may be cancelled.       If you have any other questions regarding your procedure or the day of surgery, please call [phone]      _________________________  ____________________________  Signature (Patient)              Signature (Provider) & date

## 2020-10-15 NOTE — PROGRESS NOTES
Pt declined the need to watch the video on nerve blocks during her pat visit. Information with the web site address given to the patient so she can view the video at home.

## 2020-10-15 NOTE — PROGRESS NOTES
PAT Progress Note    Pt Name: Mauricio Rosales  MRN: [id number]  YOB: 1960  Date of evaluation: 10/15/2020      [x] Called to PAT. I spoke to Mauricio Rosales a 61 y.o. female who is scheduled for an anterior approach left total hip arthroplasty by Dr. Tal Manzano on 11/03/2020 at 0730 due to left hip OA. I reviewed the internal medicine progress note found in [facility] dated 10/13/2020 by Dr. Arnie Sam. This meets the criteria for an interval History and Physical and will be updated with a note on the day of the patient's surgery. Sleep apnea questionnaire  1) Do you snore loudly? No  2) Do you often feel tired, fatigued, or sleepy? No  3) Has anyone observed you stop breathing or choking/gasping during your sleep? No  4) Do you have hypertension? Yes  5) BMI >35 kg/m2? No  6) Age > 50? Yes  7) Pt is a male? No    Functional Capacity per pt:   1) Pt is able to walk 2 city blocks on level ground without SOB. 2) Pt is able to climb 2 flights of stairs without SOB. 3) Pt is able to walk up a hill for 1-2 city blocks without SOB.       Aiyana SANDERS, [facility]  Electronically signed 10/15/2020 at 11:42 Moe Lewis MD    Physician    Specialty:  Internal Medicine    Progress Notes    Signed    Encounter Date:  10/13/2020          Related encounter: Office Visit from 10/13/2020 in [facility] All     Show:Clear all  [x]Manual[x]Template[]Copied    Added by:  Eugene Curran MD    []Kamryn for details                 [facility]     Progress Note     Date of patient's visit: 10/13/2020     Patient's Name:  Mauricio Rosales                       YOB: 1960             Patient Care Team:  Darlene Saha MD as PCP - General (Internal Medicine)  Darlene Saha MD as PCP - [facility]  Donavan Mane MD as Consulting Physician (Rheumatology)  Carolina Cade MD as Wears glasses             Past Surgical History         Past Surgical History:   Procedure Laterality Date    COLONOSCOPY         polyps removed    FOOT SURGERY   1968    HYSTERECTOMY        HYSTERECTOMY, TOTAL ABDOMINAL   1997    NERVE BLOCK   2/5/14     caudal #1  celestone 9mg    NERVE BLOCK   2/11/14     caudal #2  celestone 9mg    NERVE BLOCK   02/18/14     caudal# 3, celestone 9 mg    OTHER SURGICAL HISTORY         SPINAL ENDOSCOPY X3    SHOULDER ARTHROSCOPY Left 06/13/15    SHOULDER SURGERY   Right; 2011    SPINE SURGERY   2009    TOTAL HIP ARTHROPLASTY Right 10/12/2016              ALLERGIES             Allergies   Allergen Reactions    Asa [Aspirin] Nausea Only    Eggs Or Egg-Derived Products      Ibuprofen Nausea Only    Neurontin [Gabapentin] Nausea Only    Shellfish-Derived Products      Shrimp Flavor         TROUBLE BREATHING    Tape [Adhesive Tape]         MEDICATIONS:              Current Outpatient Medications on File Prior to Visit   Medication Sig Dispense Refill    colchicine (COLCRYS) 0.6 MG tablet take 1 tablet by mouth once daily 30 tablet 1    amLODIPine (NORVASC) 10 MG tablet Take 1 tablet by mouth daily 30 tablet 5    omeprazole (PRILOSEC) 20 MG delayed release capsule Take 1 capsule by mouth Daily take 1 capsule by mouth once daily 30-60 MINUTES BEFORE THE FIRST MEAL OF THE DAY 30 capsule 5    Multiple Vitamins-Minerals (THERAPEUTIC MULTIVITAMIN-MINERALS) tablet Take 1 tablet by mouth daily 30 tablet 5    acetaminophen (TYLENOL) 325 MG tablet Take 2 tablets by mouth every 6 hours as needed for Pain 90 tablet 5      No current facility-administered medications on file prior to visit. SOCIAL HISTORY    Reviewed and no change from previous record. Tano Rios  reports that she has been smoking cigarettes and e-cigarettes. She has a 6.25 pack-year smoking history.  She has never used smokeless tobacco.     FAMILY HISTORY:    Reviewed and No change from previous visit HEALTH MAINTENANCE DUE:            Health Maintenance Due   Topic Date Due    Potassium monitoring  10/06/2018    Creatinine monitoring  10/06/2018    Breast cancer screen  01/18/2020         REVIEW OF SYSTEMS:    12 point review of symptoms completed and found to be normal except noted in the HPI     Review of Systems   Constitutional: Negative for unexpected weight change. HENT: Positive for congestion. Negative for rhinorrhea, sinus pressure and sneezing. Eyes: Negative for redness and visual disturbance. Respiratory: Negative for cough, shortness of breath and wheezing. Cardiovascular: Negative for chest pain, palpitations and leg swelling. Gastrointestinal: Negative for abdominal pain, constipation and diarrhea. Endocrine: Negative for polydipsia and polyuria. Genitourinary: Negative for dysuria, flank pain, hematuria and urgency. Musculoskeletal: Positive for arthralgias. Negative for back pain. Allergic/Immunologic: Positive for environmental allergies. Negative for immunocompromised state. Neurological: Negative for dizziness, syncope, weakness, numbness and headaches. Hematological: Negative for adenopathy. Does not bruise/bleed easily. Psychiatric/Behavioral: Negative for dysphoric mood. The patient is nervous/anxious. PHYSICAL EXAM:      Vitals   Vitals:     10/13/20 1058   BP: 120/81   Site: Right Upper Arm   Position: Sitting   Cuff Size: Medium Adult   Pulse: 87   Weight: 175 lb (79.4 kg)   Height: 5' 6\" (1.676 m)        Body mass index is 28.25 kg/m². BP Readings from Last 3 Encounters:   10/13/20 120/81   02/11/20 132/83   09/03/19 133/82             Wt Readings from Last 3 Encounters:   10/13/20 175 lb (79.4 kg)   02/11/20 170 lb (77.1 kg)   09/03/19 171 lb (77.6 kg)         Physical Exam  Vitals signs and nursing note reviewed. Constitutional:       Appearance: Normal appearance. HENT:      Head: Normocephalic and atraumatic.    Eyes:      General: tolerance)  Advised to get A1c and BMP done. 3. Primary osteoarthritis of both hips  Patient stable for surgery. Needs EKG perioperatively along with labs. - EKG 12 lead; Future     4. Preop examination     - EKG 12 lead; Future     5. Smoker  Discussed smoking cessation which she says she will try. FOLLOW UP AND INSTRUCTIONS: 1. Return in about 3 months (around 1/13/2021). 1. Felicita received counseling on the following healthy behaviors: nutrition, exercise and medication adherence     2. Reviewed prior labs and health maintenance. 3. Discussed use, benefit, and side effects of prescribed medications. Barriers to medication compliance addressed. All patient questions answered. Pt voiced understanding.             Bettye Mckeon  Attending Physician, [address] [place] [address], Internal Medicine Residency Program  [address] [place] [address]  10/13/2020, 11:16 AM

## 2020-10-16 ENCOUNTER — TELEPHONE (OUTPATIENT)
Dept: INTERNAL MEDICINE | Age: 60
End: 2020-10-16

## 2020-10-16 LAB
CULTURE: ABNORMAL
DIRECT EXAM: NORMAL
EKG ATRIAL RATE: 75 BPM
EKG P AXIS: 56 DEGREES
EKG P-R INTERVAL: 178 MS
EKG Q-T INTERVAL: 410 MS
EKG QRS DURATION: 96 MS
EKG QTC CALCULATION (BAZETT): 457 MS
EKG R AXIS: 4 DEGREES
EKG T AXIS: 50 DEGREES
EKG VENTRICULAR RATE: 75 BPM
Lab: ABNORMAL
Lab: NORMAL
SPECIMEN DESCRIPTION: ABNORMAL
SPECIMEN DESCRIPTION: NORMAL

## 2020-10-16 ASSESSMENT — HOOS JR
SITTING: 2
LYING IN BED (TURNING OVER, MAINTAINING HIP POSITION): 3
BENDING TO THE FLOOR TO PICK UP OBJECT: 3
HOOS JR RAW SCORE: 12
WALKING ON UNEVEN SURFACE: 2
RISING FROM SITTING: 2
GOING UP OR DOWN STAIRS: 0

## 2020-10-16 ASSESSMENT — PROMIS GLOBAL HEALTH SCALE
IN GENERAL, WOULD YOU SAY YOUR QUALITY OF LIFE IS...[ON A SCALE OF 1 (POOR) TO 5 (EXCELLENT)]: 2
TO WHAT EXTENT ARE YOU ABLE TO CARRY OUT YOUR EVERYDAY PHYSICAL ACTIVITIES SUCH AS WALKING, CLIMBING STAIRS, CARRYING GROCERIES, OR MOVING A CHAIR [ON A SCALE OF 1 (NOT AT ALL) TO 5 (COMPLETELY)]?: 3
IN GENERAL, PLEASE RATE HOW WELL YOU CARRY OUT YOUR USUAL SOCIAL ACTIVITIES (INCLUDES ACTIVITIES AT HOME, AT WORK, AND IN YOUR COMMUNITY, AND RESPONSIBILITIES AS A PARENT, CHILD, SPOUSE, EMPLOYEE, FRIEND, ETC) [ON A SCALE OF 1 (POOR) TO 5 (EXCELLENT)]?: 3
IN GENERAL, WOULD YOU SAY YOUR HEALTH IS...[ON A SCALE OF 1 (POOR) TO 5 (EXCELLENT)]: 2
IN GENERAL, HOW WOULD YOU RATE YOUR PHYSICAL HEALTH [ON A SCALE OF 1 (POOR) TO 5 (EXCELLENT)]?: 2
IN THE PAST 7 DAYS, HOW OFTEN HAVE YOU BEEN BOTHERED BY EMOTIONAL PROBLEMS, SUCH AS FEELING ANXIOUS, DEPRESSED, OR IRRITABLE [ON A SCALE FROM 1 (NEVER) TO 5 (ALWAYS)]?: 3
IN GENERAL, HOW WOULD YOU RATE YOUR MENTAL HEALTH, INCLUDING YOUR MOOD AND YOUR ABILITY TO THINK [ON A SCALE OF 1 (POOR) TO 5 (EXCELLENT)]?: 3
WHO IS THE PERSON COMPLETING THE PROMIS V1.1 SURVEY?: 0
HOW IS THE PROMIS V1.1 BEING ADMINISTERED?: 2
IN THE PAST 7 DAYS, HOW WOULD YOU RATE YOUR PAIN ON AVERAGE [ON A SCALE FROM 0 (NO PAIN) TO 10 (WORST IMAGINABLE PAIN)]?: 7
IN THE PAST 7 DAYS, HOW WOULD YOU RATE YOUR FATIGUE ON AVERAGE [ON A SCALE FROM 1 (NONE) TO 5 (VERY SEVERE)]?: 3
IN GENERAL, HOW WOULD YOU RATE YOUR SATISFACTION WITH YOUR SOCIAL ACTIVITIES AND RELATIONSHIPS [ON A SCALE OF 1 (POOR) TO 5 (EXCELLENT)]?: 3

## 2020-10-16 NOTE — TELEPHONE ENCOUNTER
Dr Una Clay office calling about pt lab orders, her urine test came back positive for infection. They are hoping you will treat pt before her surgery. Please advise.

## 2020-10-16 NOTE — PROGRESS NOTES
Lab results including abnormal urine culture results, preliminary EKG and H&P all faxed to Dr Guadalupe Ochoa office.

## 2020-10-16 NOTE — TELEPHONE ENCOUNTER
Madeline with Dr. Jenn Redman office called and said they got the Microscopic Urinalysis are starting her on Bactrim for the UTI but they would like Dr. Sherry Cortez to still treat the Trichomonas.

## 2020-10-16 NOTE — PROGRESS NOTES
[facility] from Dr. Avery Brooks office and she stated that the patient is being treated for her UTI with Bactrim and her positive trichomonas will be treated with her OB/GYN.

## 2020-10-20 RX ORDER — METRONIDAZOLE 500 MG/1
500 TABLET ORAL 2 TIMES DAILY
Qty: 14 TABLET | Refills: 0 | Status: SHIPPED | OUTPATIENT
Start: 2020-10-20 | End: 2020-10-27

## 2020-10-30 ENCOUNTER — HOSPITAL ENCOUNTER (OUTPATIENT)
Dept: PREADMISSION TESTING | Age: 60
Setting detail: SPECIMEN
Discharge: HOME | End: 2020-11-03
Payer: COMMERCIAL

## 2020-10-30 PROCEDURE — U0003 INFECTIOUS AGENT DETECTION BY NUCLEIC ACID (DNA OR RNA); SEVERE ACUTE RESPIRATORY SYNDROME CORONAVIRUS 2 (SARS-COV-2) (CORONAVIRUS DISEASE [COVID-19]), AMPLIFIED PROBE TECHNIQUE, MAKING USE OF HIGH THROUGHPUT TECHNOLOGIES AS DESCRIBED BY CMS-2020-01-R: HCPCS

## 2020-10-31 LAB
SARS-COV-2, RAPID: NORMAL
SARS-COV-2: NORMAL
SARS-COV-2: NOT DETECTED
SOURCE: NORMAL

## 2020-11-03 ENCOUNTER — ANESTHESIA EVENT (OUTPATIENT)
Dept: OPERATING ROOM | Age: 60
End: 2020-11-03
Payer: COMMERCIAL

## 2020-11-03 ENCOUNTER — APPOINTMENT (OUTPATIENT)
Dept: GENERAL RADIOLOGY | Age: 60
End: 2020-11-03
Attending: ORTHOPAEDIC SURGERY
Payer: COMMERCIAL

## 2020-11-03 ENCOUNTER — HOSPITAL ENCOUNTER (OUTPATIENT)
Age: 60
Discharge: HOME | End: 2020-11-04
Attending: ORTHOPAEDIC SURGERY | Admitting: ORTHOPAEDIC SURGERY
Payer: COMMERCIAL

## 2020-11-03 ENCOUNTER — ANESTHESIA (OUTPATIENT)
Dept: OPERATING ROOM | Age: 60
End: 2020-11-03
Payer: COMMERCIAL

## 2020-11-03 VITALS — DIASTOLIC BLOOD PRESSURE: 92 MMHG | OXYGEN SATURATION: 95 % | SYSTOLIC BLOOD PRESSURE: 171 MMHG | TEMPERATURE: 96.6 F

## 2020-11-03 PROBLEM — Z96.642 STATUS POST TOTAL REPLACEMENT OF LEFT HIP: Status: ACTIVE | Noted: 2020-11-03

## 2020-11-03 PROBLEM — M16.12 PRIMARY OSTEOARTHRITIS OF LEFT HIP: Chronic | Status: ACTIVE | Noted: 2020-11-03

## 2020-11-03 PROCEDURE — 72170 X-RAY EXAM OF PELVIS: CPT

## 2020-11-03 PROCEDURE — 2580000003 HC RX 258: Performed by: ANESTHESIOLOGY

## 2020-11-03 PROCEDURE — 7100000001 HC PACU RECOVERY - ADDTL 15 MIN: Performed by: ORTHOPAEDIC SURGERY

## 2020-11-03 PROCEDURE — 6360000002 HC RX W HCPCS: Performed by: ANESTHESIOLOGY

## 2020-11-03 PROCEDURE — 3600000005 HC SURGERY LEVEL 5 BASE: Performed by: ORTHOPAEDIC SURGERY

## 2020-11-03 PROCEDURE — 73501 X-RAY EXAM HIP UNI 1 VIEW: CPT

## 2020-11-03 PROCEDURE — 2709999900 HC NON-CHARGEABLE SUPPLY: Performed by: ORTHOPAEDIC SURGERY

## 2020-11-03 PROCEDURE — 97535 SELF CARE MNGMENT TRAINING: CPT

## 2020-11-03 PROCEDURE — 6370000000 HC RX 637 (ALT 250 FOR IP): Performed by: ORTHOPAEDIC SURGERY

## 2020-11-03 PROCEDURE — 2720000010 HC SURG SUPPLY STERILE: Performed by: ORTHOPAEDIC SURGERY

## 2020-11-03 PROCEDURE — 2580000003 HC RX 258: Performed by: ORTHOPAEDIC SURGERY

## 2020-11-03 PROCEDURE — 97166 OT EVAL MOD COMPLEX 45 MIN: CPT

## 2020-11-03 PROCEDURE — C1776 JOINT DEVICE (IMPLANTABLE): HCPCS | Performed by: ORTHOPAEDIC SURGERY

## 2020-11-03 PROCEDURE — 3700000000 HC ANESTHESIA ATTENDED CARE: Performed by: ORTHOPAEDIC SURGERY

## 2020-11-03 PROCEDURE — 97162 PT EVAL MOD COMPLEX 30 MIN: CPT

## 2020-11-03 PROCEDURE — 3700000001 HC ADD 15 MINUTES (ANESTHESIA): Performed by: ORTHOPAEDIC SURGERY

## 2020-11-03 PROCEDURE — 2500000003 HC RX 250 WO HCPCS: Performed by: NURSE ANESTHETIST, CERTIFIED REGISTERED

## 2020-11-03 PROCEDURE — 6370000000 HC RX 637 (ALT 250 FOR IP): Performed by: ANESTHESIOLOGY

## 2020-11-03 PROCEDURE — 97530 THERAPEUTIC ACTIVITIES: CPT

## 2020-11-03 PROCEDURE — 7100000000 HC PACU RECOVERY - FIRST 15 MIN: Performed by: ORTHOPAEDIC SURGERY

## 2020-11-03 PROCEDURE — 6360000002 HC RX W HCPCS: Performed by: NURSE ANESTHETIST, CERTIFIED REGISTERED

## 2020-11-03 PROCEDURE — 76942 ECHO GUIDE FOR BIOPSY: CPT | Performed by: ANESTHESIOLOGY

## 2020-11-03 PROCEDURE — 6360000002 HC RX W HCPCS: Performed by: ORTHOPAEDIC SURGERY

## 2020-11-03 PROCEDURE — 3600000015 HC SURGERY LEVEL 5 ADDTL 15MIN: Performed by: ORTHOPAEDIC SURGERY

## 2020-11-03 DEVICE — HEAD FEM DIA36MM -2MM OFFSET 12/14 TAPR ARTC EZ HIP MTL: Type: IMPLANTABLE DEVICE | Site: HIP | Status: FUNCTIONAL

## 2020-11-03 DEVICE — SCREW BNE L25MM DIA6.5MM CANC HIP S STL GRIPTION FULL THRD: Type: IMPLANTABLE DEVICE | Site: HIP | Status: FUNCTIONAL

## 2020-11-03 DEVICE — LINER ACET OD52MM ID36MM +4MM OFFSET 10DEG HIP POLYETH MTL: Type: IMPLANTABLE DEVICE | Site: HIP | Status: FUNCTIONAL

## 2020-11-03 DEVICE — STEM FEM SZ 5 L105MM 12/14 TAPR HI OFFSET HIP DUOFIX CLLRD: Type: IMPLANTABLE DEVICE | Site: HIP | Status: FUNCTIONAL

## 2020-11-03 DEVICE — CUP ACET DIA52MM HIP GRIPTION PRI CEMENTLESS FIX SECT SER: Type: IMPLANTABLE DEVICE | Site: HIP | Status: FUNCTIONAL

## 2020-11-03 DEVICE — ELIMINATOR H APEX FOR 48-60MM PINN HIP SHELL: Type: IMPLANTABLE DEVICE | Site: HIP | Status: FUNCTIONAL

## 2020-11-03 RX ORDER — ONDANSETRON 2 MG/ML
4 INJECTION INTRAMUSCULAR; INTRAVENOUS
Status: DISCONTINUED | OUTPATIENT
Start: 2020-11-03 | End: 2020-11-03 | Stop reason: HOSPADM

## 2020-11-03 RX ORDER — HYDROMORPHONE HCL 110MG/55ML
0.25 PATIENT CONTROLLED ANALGESIA SYRINGE INTRAVENOUS EVERY 5 MIN PRN
Status: DISCONTINUED | OUTPATIENT
Start: 2020-11-03 | End: 2020-11-03 | Stop reason: HOSPADM

## 2020-11-03 RX ORDER — TRANEXAMIC ACID 100 MG/ML
INJECTION, SOLUTION INTRAVENOUS PRN
Status: DISCONTINUED | OUTPATIENT
Start: 2020-11-03 | End: 2020-11-03 | Stop reason: SDUPTHER

## 2020-11-03 RX ORDER — OXYCODONE HYDROCHLORIDE 5 MG/1
10 TABLET ORAL EVERY 4 HOURS PRN
Status: DISCONTINUED | OUTPATIENT
Start: 2020-11-03 | End: 2020-11-04 | Stop reason: HOSPADM

## 2020-11-03 RX ORDER — FENTANYL CITRATE 50 UG/ML
INJECTION, SOLUTION INTRAMUSCULAR; INTRAVENOUS PRN
Status: DISCONTINUED | OUTPATIENT
Start: 2020-11-03 | End: 2020-11-03 | Stop reason: SDUPTHER

## 2020-11-03 RX ORDER — CELECOXIB 200 MG/1
200 CAPSULE ORAL ONCE
Status: DISCONTINUED | OUTPATIENT
Start: 2020-11-03 | End: 2020-11-03

## 2020-11-03 RX ORDER — OXYCODONE HYDROCHLORIDE 5 MG/1
5 TABLET ORAL EVERY 4 HOURS PRN
Status: DISCONTINUED | OUTPATIENT
Start: 2020-11-03 | End: 2020-11-04 | Stop reason: HOSPADM

## 2020-11-03 RX ORDER — MIDAZOLAM HYDROCHLORIDE 1 MG/ML
INJECTION INTRAMUSCULAR; INTRAVENOUS PRN
Status: DISCONTINUED | OUTPATIENT
Start: 2020-11-03 | End: 2020-11-03 | Stop reason: SDUPTHER

## 2020-11-03 RX ORDER — SODIUM CHLORIDE, SODIUM LACTATE, POTASSIUM CHLORIDE, CALCIUM CHLORIDE 600; 310; 30; 20 MG/100ML; MG/100ML; MG/100ML; MG/100ML
INJECTION, SOLUTION INTRAVENOUS CONTINUOUS
Status: DISCONTINUED | OUTPATIENT
Start: 2020-11-03 | End: 2020-11-04 | Stop reason: HOSPADM

## 2020-11-03 RX ORDER — PROMETHAZINE HYDROCHLORIDE 12.5 MG/1
12.5 TABLET ORAL EVERY 6 HOURS PRN
Status: DISCONTINUED | OUTPATIENT
Start: 2020-11-03 | End: 2020-11-04 | Stop reason: HOSPADM

## 2020-11-03 RX ORDER — FENTANYL CITRATE 50 UG/ML
50 INJECTION, SOLUTION INTRAMUSCULAR; INTRAVENOUS ONCE
Status: DISCONTINUED | OUTPATIENT
Start: 2020-11-03 | End: 2020-11-04 | Stop reason: HOSPADM

## 2020-11-03 RX ORDER — PROPOFOL 10 MG/ML
INJECTION, EMULSION INTRAVENOUS PRN
Status: DISCONTINUED | OUTPATIENT
Start: 2020-11-03 | End: 2020-11-03 | Stop reason: SDUPTHER

## 2020-11-03 RX ORDER — OXYCODONE HYDROCHLORIDE 5 MG/1
TABLET ORAL
Status: DISPENSED
Start: 2020-11-03 | End: 2020-11-04

## 2020-11-03 RX ORDER — HYDROMORPHONE HCL 110MG/55ML
0.5 PATIENT CONTROLLED ANALGESIA SYRINGE INTRAVENOUS EVERY 5 MIN PRN
Status: COMPLETED | OUTPATIENT
Start: 2020-11-03 | End: 2020-11-03

## 2020-11-03 RX ORDER — LIDOCAINE HYDROCHLORIDE 20 MG/ML
INJECTION, SOLUTION EPIDURAL; INFILTRATION; INTRACAUDAL; PERINEURAL PRN
Status: DISCONTINUED | OUTPATIENT
Start: 2020-11-03 | End: 2020-11-03 | Stop reason: SDUPTHER

## 2020-11-03 RX ORDER — SODIUM CHLORIDE 0.9 % (FLUSH) 0.9 %
10 SYRINGE (ML) INJECTION PRN
Status: DISCONTINUED | OUTPATIENT
Start: 2020-11-03 | End: 2020-11-04 | Stop reason: HOSPADM

## 2020-11-03 RX ORDER — LIDOCAINE HYDROCHLORIDE 10 MG/ML
1 INJECTION, SOLUTION EPIDURAL; INFILTRATION; INTRACAUDAL; PERINEURAL
Status: DISCONTINUED | OUTPATIENT
Start: 2020-11-04 | End: 2020-11-03 | Stop reason: HOSPADM

## 2020-11-03 RX ORDER — ROPIVACAINE HYDROCHLORIDE 2 MG/ML
INJECTION, SOLUTION EPIDURAL; INFILTRATION; PERINEURAL PRN
Status: DISCONTINUED | OUTPATIENT
Start: 2020-11-03 | End: 2020-11-03 | Stop reason: SDUPTHER

## 2020-11-03 RX ORDER — ROCURONIUM BROMIDE 10 MG/ML
INJECTION, SOLUTION INTRAVENOUS PRN
Status: DISCONTINUED | OUTPATIENT
Start: 2020-11-03 | End: 2020-11-03 | Stop reason: SDUPTHER

## 2020-11-03 RX ORDER — HYDROMORPHONE HCL 110MG/55ML
PATIENT CONTROLLED ANALGESIA SYRINGE INTRAVENOUS PRN
Status: DISCONTINUED | OUTPATIENT
Start: 2020-11-03 | End: 2020-11-03 | Stop reason: SDUPTHER

## 2020-11-03 RX ORDER — ACETAMINOPHEN 500 MG
1000 TABLET ORAL ONCE
Status: COMPLETED | OUTPATIENT
Start: 2020-11-03 | End: 2020-11-03

## 2020-11-03 RX ORDER — ONDANSETRON 2 MG/ML
4 INJECTION INTRAMUSCULAR; INTRAVENOUS EVERY 6 HOURS PRN
Status: DISCONTINUED | OUTPATIENT
Start: 2020-11-03 | End: 2020-11-04 | Stop reason: HOSPADM

## 2020-11-03 RX ORDER — ONDANSETRON 2 MG/ML
INJECTION INTRAMUSCULAR; INTRAVENOUS PRN
Status: DISCONTINUED | OUTPATIENT
Start: 2020-11-03 | End: 2020-11-03 | Stop reason: SDUPTHER

## 2020-11-03 RX ORDER — FENTANYL CITRATE 50 UG/ML
50 INJECTION, SOLUTION INTRAMUSCULAR; INTRAVENOUS EVERY 5 MIN PRN
Status: COMPLETED | OUTPATIENT
Start: 2020-11-03 | End: 2020-11-03

## 2020-11-03 RX ORDER — SODIUM CHLORIDE, SODIUM LACTATE, POTASSIUM CHLORIDE, CALCIUM CHLORIDE 600; 310; 30; 20 MG/100ML; MG/100ML; MG/100ML; MG/100ML
INJECTION, SOLUTION INTRAVENOUS CONTINUOUS
Status: DISCONTINUED | OUTPATIENT
Start: 2020-11-04 | End: 2020-11-04 | Stop reason: HOSPADM

## 2020-11-03 RX ORDER — ACETAMINOPHEN 325 MG/1
650 TABLET ORAL EVERY 6 HOURS
Status: DISCONTINUED | OUTPATIENT
Start: 2020-11-03 | End: 2020-11-04 | Stop reason: HOSPADM

## 2020-11-03 RX ORDER — GABAPENTIN 300 MG/1
300 CAPSULE ORAL ONCE
Status: DISCONTINUED | OUTPATIENT
Start: 2020-11-03 | End: 2020-11-03

## 2020-11-03 RX ORDER — FENTANYL CITRATE 50 UG/ML
25 INJECTION, SOLUTION INTRAMUSCULAR; INTRAVENOUS EVERY 5 MIN PRN
Status: DISCONTINUED | OUTPATIENT
Start: 2020-11-03 | End: 2020-11-03 | Stop reason: HOSPADM

## 2020-11-03 RX ORDER — SODIUM CHLORIDE 0.9 % (FLUSH) 0.9 %
10 SYRINGE (ML) INJECTION EVERY 12 HOURS SCHEDULED
Status: DISCONTINUED | OUTPATIENT
Start: 2020-11-03 | End: 2020-11-04 | Stop reason: HOSPADM

## 2020-11-03 RX ADMIN — PROPOFOL 160 MG: 10 INJECTION, EMULSION INTRAVENOUS at 07:45

## 2020-11-03 RX ADMIN — HYDROMORPHONE HYDROCHLORIDE 0.5 MG: 2 INJECTION, SOLUTION INTRAMUSCULAR; INTRAVENOUS; SUBCUTANEOUS at 11:32

## 2020-11-03 RX ADMIN — HYDROMORPHONE HYDROCHLORIDE 0.5 MG: 1 INJECTION, SOLUTION INTRAMUSCULAR; INTRAVENOUS; SUBCUTANEOUS at 21:34

## 2020-11-03 RX ADMIN — TRANEXAMIC ACID 1000 MG: 1 INJECTION, SOLUTION INTRAVENOUS at 09:00

## 2020-11-03 RX ADMIN — ACETAMINOPHEN 650 MG: 325 TABLET ORAL at 15:32

## 2020-11-03 RX ADMIN — OXYCODONE HYDROCHLORIDE 10 MG: 5 TABLET ORAL at 23:18

## 2020-11-03 RX ADMIN — Medication 50 MCG: at 08:09

## 2020-11-03 RX ADMIN — HYDROMORPHONE HYDROCHLORIDE 1 MG: 2 INJECTION INTRAMUSCULAR; INTRAVENOUS; SUBCUTANEOUS at 08:32

## 2020-11-03 RX ADMIN — TRANEXAMIC ACID 1000 MG: 1 INJECTION, SOLUTION INTRAVENOUS at 08:13

## 2020-11-03 RX ADMIN — FENTANYL CITRATE 50 MCG: 50 INJECTION, SOLUTION INTRAMUSCULAR; INTRAVENOUS at 10:38

## 2020-11-03 RX ADMIN — CEFAZOLIN SODIUM 2 G: 10 INJECTION, POWDER, FOR SOLUTION INTRAVENOUS at 07:40

## 2020-11-03 RX ADMIN — ROPIVACAINE HYDROCHLORIDE 40 ML: 2 INJECTION, SOLUTION EPIDURAL; INFILTRATION at 12:32

## 2020-11-03 RX ADMIN — Medication 50 MCG: at 07:43

## 2020-11-03 RX ADMIN — RIVAROXABAN 10 MG: 10 TABLET, FILM COATED ORAL at 21:37

## 2020-11-03 RX ADMIN — ROCURONIUM BROMIDE 20 MG: 10 INJECTION, SOLUTION INTRAVENOUS at 08:32

## 2020-11-03 RX ADMIN — HYDROMORPHONE HYDROCHLORIDE 0.5 MG: 1 INJECTION, SOLUTION INTRAMUSCULAR; INTRAVENOUS; SUBCUTANEOUS at 16:07

## 2020-11-03 RX ADMIN — ONDANSETRON 4 MG: 2 INJECTION, SOLUTION INTRAMUSCULAR; INTRAVENOUS at 09:52

## 2020-11-03 RX ADMIN — FENTANYL CITRATE 50 MCG: 50 INJECTION, SOLUTION INTRAMUSCULAR; INTRAVENOUS at 10:33

## 2020-11-03 RX ADMIN — SODIUM CHLORIDE, POTASSIUM CHLORIDE, SODIUM LACTATE AND CALCIUM CHLORIDE: 600; 310; 30; 20 INJECTION, SOLUTION INTRAVENOUS at 06:54

## 2020-11-03 RX ADMIN — ROCURONIUM BROMIDE 50 MG: 10 INJECTION, SOLUTION INTRAVENOUS at 07:45

## 2020-11-03 RX ADMIN — FENTANYL CITRATE 50 MCG: 50 INJECTION, SOLUTION INTRAMUSCULAR; INTRAVENOUS at 10:44

## 2020-11-03 RX ADMIN — FENTANYL CITRATE 50 MCG: 50 INJECTION, SOLUTION INTRAMUSCULAR; INTRAVENOUS at 11:55

## 2020-11-03 RX ADMIN — SODIUM CHLORIDE, POTASSIUM CHLORIDE, SODIUM LACTATE AND CALCIUM CHLORIDE: 600; 310; 30; 20 INJECTION, SOLUTION INTRAVENOUS at 23:22

## 2020-11-03 RX ADMIN — OXYCODONE HYDROCHLORIDE 10 MG: 5 TABLET ORAL at 14:29

## 2020-11-03 RX ADMIN — SODIUM CHLORIDE, POTASSIUM CHLORIDE, SODIUM LACTATE AND CALCIUM CHLORIDE: 600; 310; 30; 20 INJECTION, SOLUTION INTRAVENOUS at 16:07

## 2020-11-03 RX ADMIN — ROCURONIUM BROMIDE 20 MG: 10 INJECTION, SOLUTION INTRAVENOUS at 09:26

## 2020-11-03 RX ADMIN — SODIUM CHLORIDE, POTASSIUM CHLORIDE, SODIUM LACTATE AND CALCIUM CHLORIDE: 600; 310; 30; 20 INJECTION, SOLUTION INTRAVENOUS at 10:16

## 2020-11-03 RX ADMIN — ACETAMINOPHEN 650 MG: 325 TABLET ORAL at 20:33

## 2020-11-03 RX ADMIN — SODIUM CHLORIDE, POTASSIUM CHLORIDE, SODIUM LACTATE AND CALCIUM CHLORIDE: 600; 310; 30; 20 INJECTION, SOLUTION INTRAVENOUS at 07:40

## 2020-11-03 RX ADMIN — ACETAMINOPHEN 1000 MG: 500 TABLET ORAL at 07:20

## 2020-11-03 RX ADMIN — SUGAMMADEX 200 MG: 100 INJECTION, SOLUTION INTRAVENOUS at 10:15

## 2020-11-03 RX ADMIN — MIDAZOLAM 2 MG: 1 INJECTION INTRAMUSCULAR; INTRAVENOUS at 07:40

## 2020-11-03 RX ADMIN — HYDROMORPHONE HYDROCHLORIDE 0.5 MG: 2 INJECTION, SOLUTION INTRAMUSCULAR; INTRAVENOUS; SUBCUTANEOUS at 10:49

## 2020-11-03 RX ADMIN — FENTANYL CITRATE 50 MCG: 50 INJECTION, SOLUTION INTRAMUSCULAR; INTRAVENOUS at 10:28

## 2020-11-03 RX ADMIN — LIDOCAINE HYDROCHLORIDE 100 MG: 20 INJECTION, SOLUTION EPIDURAL; INFILTRATION; INTRACAUDAL; PERINEURAL at 07:45

## 2020-11-03 RX ADMIN — HYDROMORPHONE HYDROCHLORIDE 0.5 MG: 2 INJECTION, SOLUTION INTRAMUSCULAR; INTRAVENOUS; SUBCUTANEOUS at 11:13

## 2020-11-03 RX ADMIN — HYDROMORPHONE HYDROCHLORIDE 0.5 MG: 2 INJECTION, SOLUTION INTRAMUSCULAR; INTRAVENOUS; SUBCUTANEOUS at 10:59

## 2020-11-03 ASSESSMENT — PULMONARY FUNCTION TESTS
PIF_VALUE: 19
PIF_VALUE: 18
PIF_VALUE: 19
PIF_VALUE: 18
PIF_VALUE: 18
PIF_VALUE: 17
PIF_VALUE: 18
PIF_VALUE: 19
PIF_VALUE: 19
PIF_VALUE: 20
PIF_VALUE: 19
PIF_VALUE: 19
PIF_VALUE: 20
PIF_VALUE: 19
PIF_VALUE: 19
PIF_VALUE: 18
PIF_VALUE: 19
PIF_VALUE: 20
PIF_VALUE: 19
PIF_VALUE: 19
PIF_VALUE: 17
PIF_VALUE: 19
PIF_VALUE: 18
PIF_VALUE: 17
PIF_VALUE: 19
PIF_VALUE: 1
PIF_VALUE: 13
PIF_VALUE: 28
PIF_VALUE: 18
PIF_VALUE: 19
PIF_VALUE: 20
PIF_VALUE: 19
PIF_VALUE: 0
PIF_VALUE: 29
PIF_VALUE: 18
PIF_VALUE: 19
PIF_VALUE: 21
PIF_VALUE: 19
PIF_VALUE: 21
PIF_VALUE: 11
PIF_VALUE: 19
PIF_VALUE: 19
PIF_VALUE: 17
PIF_VALUE: 20
PIF_VALUE: 17
PIF_VALUE: 20
PIF_VALUE: 18
PIF_VALUE: 19
PIF_VALUE: 19
PIF_VALUE: 18
PIF_VALUE: 19
PIF_VALUE: 19
PIF_VALUE: 29
PIF_VALUE: 1
PIF_VALUE: 20
PIF_VALUE: 19
PIF_VALUE: 18
PIF_VALUE: 19
PIF_VALUE: 18
PIF_VALUE: 20
PIF_VALUE: 19
PIF_VALUE: 17
PIF_VALUE: 22
PIF_VALUE: 19
PIF_VALUE: 18
PIF_VALUE: 20
PIF_VALUE: 19
PIF_VALUE: 19
PIF_VALUE: 23
PIF_VALUE: 19
PIF_VALUE: 19
PIF_VALUE: 17
PIF_VALUE: 19
PIF_VALUE: 17
PIF_VALUE: 18
PIF_VALUE: 18
PIF_VALUE: 22
PIF_VALUE: 19
PIF_VALUE: 20
PIF_VALUE: 18
PIF_VALUE: 19
PIF_VALUE: 19
PIF_VALUE: 18
PIF_VALUE: 21
PIF_VALUE: 18
PIF_VALUE: 19
PIF_VALUE: 14
PIF_VALUE: 23
PIF_VALUE: 17
PIF_VALUE: 1
PIF_VALUE: 0
PIF_VALUE: 18
PIF_VALUE: 20
PIF_VALUE: 18
PIF_VALUE: 19
PIF_VALUE: 18
PIF_VALUE: 19
PIF_VALUE: 6
PIF_VALUE: 19
PIF_VALUE: 0
PIF_VALUE: 3
PIF_VALUE: 18
PIF_VALUE: 17
PIF_VALUE: 19
PIF_VALUE: 1
PIF_VALUE: 19
PIF_VALUE: 18
PIF_VALUE: 19
PIF_VALUE: 20
PIF_VALUE: 18
PIF_VALUE: 19
PIF_VALUE: 18
PIF_VALUE: 18
PIF_VALUE: 19
PIF_VALUE: 19
PIF_VALUE: 18
PIF_VALUE: 0
PIF_VALUE: 19
PIF_VALUE: 18
PIF_VALUE: 19
PIF_VALUE: 3
PIF_VALUE: 19
PIF_VALUE: 18
PIF_VALUE: 19
PIF_VALUE: 20
PIF_VALUE: 19
PIF_VALUE: 19
PIF_VALUE: 18
PIF_VALUE: 17
PIF_VALUE: 19

## 2020-11-03 ASSESSMENT — PAIN DESCRIPTION - LOCATION
LOCATION: HIP

## 2020-11-03 ASSESSMENT — PAIN SCALES - GENERAL
PAINLEVEL_OUTOF10: 10
PAINLEVEL_OUTOF10: 10
PAINLEVEL_OUTOF10: 9
PAINLEVEL_OUTOF10: 9
PAINLEVEL_OUTOF10: 6
PAINLEVEL_OUTOF10: 5
PAINLEVEL_OUTOF10: 4
PAINLEVEL_OUTOF10: 8
PAINLEVEL_OUTOF10: 8
PAINLEVEL_OUTOF10: 10
PAINLEVEL_OUTOF10: 4
PAINLEVEL_OUTOF10: 10
PAINLEVEL_OUTOF10: 10
PAINLEVEL_OUTOF10: 1
PAINLEVEL_OUTOF10: 9
PAINLEVEL_OUTOF10: 10
PAINLEVEL_OUTOF10: 9
PAINLEVEL_OUTOF10: 10
PAINLEVEL_OUTOF10: 10
PAINLEVEL_OUTOF10: 9
PAINLEVEL_OUTOF10: 9
PAINLEVEL_OUTOF10: 6

## 2020-11-03 ASSESSMENT — PAIN DESCRIPTION - ORIENTATION
ORIENTATION: LEFT

## 2020-11-03 ASSESSMENT — PAIN DESCRIPTION - DESCRIPTORS
DESCRIPTORS: CRAMPING;DISCOMFORT;ACHING
DESCRIPTORS: ACHING
DESCRIPTORS: CRAMPING;DISCOMFORT
DESCRIPTORS: PATIENT UNABLE TO DESCRIBE

## 2020-11-03 ASSESSMENT — PAIN DESCRIPTION - PAIN TYPE
TYPE: CHRONIC PAIN
TYPE: ACUTE PAIN;SURGICAL PAIN
TYPE: ACUTE PAIN;SURGICAL PAIN;CHRONIC PAIN
TYPE: ACUTE PAIN;SURGICAL PAIN

## 2020-11-03 ASSESSMENT — PAIN DESCRIPTION - PROGRESSION
CLINICAL_PROGRESSION: GRADUALLY WORSENING

## 2020-11-03 ASSESSMENT — PAIN DESCRIPTION - FREQUENCY
FREQUENCY: CONTINUOUS

## 2020-11-03 ASSESSMENT — PAIN DESCRIPTION - ONSET
ONSET: AWAKENED FROM SLEEP
ONSET: ON-GOING
ONSET: ON-GOING

## 2020-11-03 ASSESSMENT — PAIN - FUNCTIONAL ASSESSMENT
PAIN_FUNCTIONAL_ASSESSMENT: PREVENTS OR INTERFERES SOME ACTIVE ACTIVITIES AND ADLS
PAIN_FUNCTIONAL_ASSESSMENT: PREVENTS OR INTERFERES SOME ACTIVE ACTIVITIES AND ADLS
PAIN_FUNCTIONAL_ASSESSMENT: PREVENTS OR INTERFERES WITH ALL ACTIVE AND SOME PASSIVE ACTIVITIES

## 2020-11-03 NOTE — OP NOTE
Operative Note      Patient: Yusef Desir  YOB: 1960  MRN: [id number]    Date of Procedure: 11/3/2020    Pre-Op Diagnosis: DX OA LEFT HIP    Post-Op Diagnosis: Same       Procedure(s):  LEFT HIP TOTAL ARTHROPLASTY ANTERIOR APPROACH- [facility]    Surgeon(s):  Jennifer Jones MD    Assistant:   Surgical Assistant: Pam Trujillo; Irwin Ryan RN  First Assistant: Tye Beach    Anesthesia: General    Estimated Blood Loss (mL): 668     Complications: None    Specimens:   * No specimens in log *    Implants:  Implant Name Type Inv.  Item Serial No.  Lot No. LRB No. Used Action   ELIMINATOR H APEX FOR 48-60MM PINN HIP SHELL  ELIMINATOR H APEX FOR 48-60MM PINN HIP SHELL  [facility] [id number] Left 1 Implanted   CUP ACET [id number] HIP GRIPTION YU CEMENTLESS FIX SECT SER  CUP ACET [id number] HIP GRIPTION YU CEMENTLESS FIX SECT SER  [facility] [id number] Left 1 Implanted   LINER ACET OD52MM ID36MM +4MM OFFSET 10DEG HIP POLYETH MTL  LINER ACET OD52MM ID36MM +4MM OFFSET 10DEG HIP POLYETH MTL  [facility] [id number] Left 1 Implanted   SCREW BNE L25MM DIA6.5MM CANC HIP S STL GRIPTION FULL THRD  SCREW BNE L25MM DIA6.5MM CANC HIP S STL GRIPTION FULL THRD  [facility] [id number] Left 1 Implanted   STEM FEM SZ 5 L105MM 12/14 TAPR HI OFFSET HIP DUOFIX CLLRD  STEM FEM SZ 5 L105MM 12/14 TAPR HI OFFSET HIP DUOFIX CLLRD  [facility] [id number] Left 1 Implanted   HEAD FEM [id number] -2MM OFFSET 12/14 TAPR ARTC EZ HIP MTL  HEAD FEM [id number] -2MM OFFSET 12/14 TAPR ARTC EZ HIP MTL  [facility] [id number] Left 1 Implanted         Drains: * No LDAs found *    Findings: SEVERE OA LEFT HIP      Detailed Description of Procedure:     Attending Surgeon: Carrie Conway M.D.  PCP: Collin Oconnor MD    Assistant: None    ANESTHESIA:  General    EBL (mL): 300 mL    Indications  Yusef Desir is a 61 y.o. female who has had severe pain and arthritis of the hip, unresponsive to conservative treatment. It is now recommended that the patient have total hip replacement. After discussion of the particulars of surgery, risks and benefits as well as alternative treatments, I believe that all of their questions were answered to their satisfaction. Informed consent is now given to proceed with surgery as discussed. Procedure  After proper patient identification and marking of the surgical site in the preoperative area, and following update of the history and physical examination, the patient was brought to the operative suite and placed on the table in supine position. The patient was identified as Ben Rosas. An adequate anesthetic was induced. Then the operative hip and lower extremity were prepped and draped in the usual sterile fashion. After marking the skin, the knife was taken and the skin was incised over the anterior aspect of the proximal thigh, diagonally just below the groin crease, over an extent of 10 cm. The incision was then carried down through subcutaneous tissue to the tensor fasciae latae muscle. The fascia was incised in line with the tensor muscle fibers, and then the anterior leaf of the investing fascia was bluntly  to expose the rectus femoris and fascia carlos musculature. The interval between rectus and vastus was incised, and the lateral circumflex vasculature was identified. Here the vessels were cauterized and then divided, and the underlying pre-capsular fat was excised to expose the anterior hip capsule at the level of the femoral neck. Retractors were placed, and the intertrochanteric tubercle was identified. Now the electrocautery knife was taken, and the anterior hip capsule was excised, in order to expose the femoral neck and head. The retractors were repositioned, and the femoral head was examined. There was advanced degenerative change evident, warranting hip replacement as planned. Now the femoral head was removed with the saw, and the femur was rotated from view to expose the arthritic acetabulum. The hemispherical reamers were then inserted under power to prepare the acetabulum for the size 52 mm cup. Excellent column contact was achieved. Next the acetabular component was inserted and impacted into position of adequate anteversion and abduction, using the Kincise automated mallet. A dome screw was inserted into the acetabulum, for enhanced primary fixation. Attention was then directed to the femur. The table was adjusted, retractors were placed, and the femoral introitus was inspected. Circumferential releases were made to mobilize the proximal femur, for medullary canal access. The box chisel and canal finder tool were then used to open the medullary canal of the femur in preparation for use of the broaches. The femoral canal was then broached and prepared for the size 5 high-offset component using the Kincise automated mallet, leaving the final broach in place as a trial stem. Now the hip was reduced and moved through a full range of motion using trial femoral neck and head components. Optimum stability, with good restoration of leg lengths and anatomic relations at the hip, were achieved with this arrangement of trial components and the 36 minus 2 mm trial femoral head. Next the trial components were removed, and then the actual femoral components were implanted using the identical sizes as trialed, using a chrome cobalt femoral ball. Again the hip was reduced and moved through a full range of motion, noting excellent stability and good restoration of anatomic relations. Leg lengths were checked and noted to be satisfactory. All additional bleeding points were then addressed and controlled by electrocautery, and the wound was then copiously irrigated including the use of dilute Betadine solution to prevent infection.  Next the incision was closed in a layerwise fashion by the first assistant, Korin Segovia CST, FA, without the use of a drain. Sponge and needle counts were correct per the circulating nurse. Sterile dressings were applied, traction was released, and then anesthesia was reversed, after which the patient was gently transferred to the bed. The patient was then able to be transported to the recovery room in good condition, having tolerated the procedure without complications and with approximately 300 mL of blood loss. The particulars of surgery as well as the condition of the patient postoperatively were discussed with the patients family thereafter.     Electronically signed by Trevor Sterling MD on 11/3/2020 at 10:31 AM

## 2020-11-03 NOTE — ANESTHESIA PRE PROCEDURE
Problem List:    Patient Active Problem List   Diagnosis Code    Osteoarthritis of both knees M17.0    Osteoarthritis of both shoulders M19.011, M19.012    DJD (degenerative joint disease), lumbosacral M47.817    Essential hypertension I10    Hyperlipidemia E78.5    Depression F32.9    Obesity E66.9    Postlaminectomy syndrome, lumbar region M96.1    Shoulder bursitis M75.50    S/P lumbar spinal fusion Z98.1    Encounter for medication monitoring Z51.81    Chronic pain syndrome G89.4    Posterior dislocation of hip ([address] S73.016A    History of total right hip replacement Z96.641    Frequent PVCs I49.3    Acute cystitis without hematuria N30.00    Primary osteoarthritis of both hips M16.0    Right hip pain M25.551    History of total right hip arthroplasty Z96.641    Hip instability M25.359    Instability of prosthetic hip ([facility] [address] T84.028A, Z96.649       Past Medical History:        Diagnosis Date    CAD (coronary artery disease)     pt denies, CARDIAC CATH O.K., STRESS O.K.    Depression 7/30/2013    Fibromyalgia     Full dentures     GERD (gastroesophageal reflux disease)     Gout     History of blood transfusion 1978    History of hematuria     micro    Hyperlipidemia 7/30/2013    Hypertension     Obesity 7/30/2013    Osteoarthritis     Pain management     sees [facility] clinic    Wears glasses        Past Surgical History:        Procedure Laterality Date    CARDIAC CATHETERIZATION      Several years ago (Written 10/15/2020). No stents placed per pt.      COLONOSCOPY      polyps removed    FOOT SURGERY  1968    HYSTERECTOMY      HYSTERECTOMY, TOTAL ABDOMINAL  1997    JOINT REPLACEMENT      NERVE BLOCK  2/5/14    caudal #1  celestone 9mg    NERVE BLOCK  2/11/14    caudal #2  celestone 9mg    NERVE BLOCK  02/18/14    caudal# 3, celestone 9 mg    OTHER SURGICAL HISTORY      SPINAL ENDOSCOPY X3    SHOULDER ARTHROSCOPY Left 06/13/15    SHOULDER SURGERY  Right; 2011    SPINE SURGERY  2009    TOTAL HIP ARTHROPLASTY Right 10/12/2016       Social History:    Social History     Tobacco Use    Smoking status: Current Every Day Smoker     Packs/day: 0.25     Years: 25.00     Pack years: 6.25     Types: Cigarettes, E-Cigarettes    Smokeless tobacco: Never Used   Substance Use Topics    Alcohol use: Yes     Alcohol/week: 0.0 standard drinks     Comment: occasionally                                Ready to quit: Not Answered  Counseling given: Not Answered      Vital Signs (Current):   Vitals:    11/03/20 0616   BP: 130/76   Pulse: 72   Resp: 15   Temp: 96.9 °F (36.1 °C)   SpO2: 99%   Weight: 173 lb 11.6 oz (78.8 kg)   Height: 5' 6\" (1.676 m)                                              BP Readings from Last 3 Encounters:   11/03/20 130/76   10/15/20 132/71   10/13/20 120/81       NPO Status: Time of last liquid consumption: 2230                        Time of last solid consumption: 2230                        Date of last liquid consumption: 11/02/20                        Date of last solid food consumption: 11/02/20    BMI:   Wt Readings from Last 3 Encounters:   11/03/20 173 lb 11.6 oz (78.8 kg)   10/15/20 173 lb 11.6 oz (78.8 kg)   10/13/20 175 lb (79.4 kg)     Body mass index is 28.04 kg/m².     CBC:   Lab Results   Component Value Date    WBC 6.4 10/13/2020    RBC 4.83 10/13/2020    HGB 13.0 10/13/2020    HCT 41.6 10/13/2020    MCV 86.1 10/13/2020    RDW 14.7 10/13/2020     10/13/2020       CMP:   Lab Results   Component Value Date     10/13/2020    K 4.0 10/13/2020     10/13/2020    CO2 25 10/13/2020    BUN 12 10/13/2020    CREATININE 0.71 10/13/2020    GFRAA >60 10/13/2020    LABGLOM >60 10/13/2020    GLUCOSE 99 10/13/2020    PROT 7.0 10/06/2017    CALCIUM 9.8 10/13/2020    BILITOT 0.30 10/06/2017    ALKPHOS 33 10/06/2017    AST 13 10/06/2017    ALT 10 10/06/2017       POC Tests: No results for input(s): POCGLU, POCNA, POCK, POCCL, POCBUN, POCHEMO, POCHCT in the last 72 hours. Coags: No results found for: PROTIME, INR, APTT    HCG (If Applicable): No results found for: PREGTESTUR, PREGSERUM, HCG, HCGQUANT     ABGs: No results found for: PHART, PO2ART, TUK6ISZ, ZYL6UIA, BEART, K8MJPCZP     Type & Screen (If Applicable):  No results found for: LABABO, LABRH    Drug/Infectious Status (If Applicable):  No results found for: HIV, HEPCAB    COVID-19 Screening (If Applicable):   Lab Results   Component Value Date    COVID19 Not Detected 10/30/2020         Anesthesia Evaluation   no history of anesthetic complications:   Airway: Mallampati: I  TM distance: >3 FB   Neck ROM: full  Mouth opening: > = 3 FB Dental:    (+) upper dentures and lower dentures      Pulmonary:                              Cardiovascular:    (+) hypertension:, CAD:, hyperlipidemia                  Neuro/Psych:               GI/Hepatic/Renal:   (+) GERD: well controlled,           Endo/Other:    (+) : arthritis: OA., .                 Abdominal:           Vascular:                                        Anesthesia Plan      general     ASA 3       Induction: intravenous. Anesthetic plan and risks discussed with patient.                       Jovanna Diaz MD   11/3/2020

## 2020-11-03 NOTE — FLOWSHEET NOTE
Dr Perdomo Primes informed that patient continues to have severe pin after 200mcg of fentanyl and 2 mg dilauded. Surgical site intact. Pt remains tearful and restless. Hip xrays completed.

## 2020-11-03 NOTE — PROGRESS NOTES
Physical Therapy    Facility/Department: [facility]  Initial Assessment- Day of Surgery  Needs Reassessment    NAME: Micheline Aburto  : 1960  MRN: [id number]    Date of Service: 11/3/2020    Discharge Recommendations:  Home with assist PRN      L SAGRARIO, minimally invasive anterior approach by Dr. Monserrat Figueroa 11/3/20  Assessment   Body structures, Functions, Activity limitations: Decreased functional mobility ; Decreased ROM; Decreased strength;Decreased sensation;Decreased balance; Increased pain  Assessment: Pt. sobbing uncontrollably at eval day of surgery due to pain and uncomfortable sensation from nerve block. Unable to assess standing / gait due to numbness LLE. Will Reassess in AM and prepare for d/c home. Specific instructions for Next Treatment: REASSESS  Prognosis: Excellent  Decision Making: Medium Complexity  PT Education: Goals;PT Role;Plan of Care  Patient Education: circulation ex. REQUIRES PT FOLLOW UP: Yes  Activity Tolerance  Activity Tolerance: Patient limited by pain(Limited by numbness)       Patient Diagnosis(es): There were no encounter diagnoses. has a past medical history of CAD (coronary artery disease), Depression, Fibromyalgia, Full dentures, GERD (gastroesophageal reflux disease), Gout, History of blood transfusion, History of hematuria, Hyperlipidemia, Hypertension, Obesity, Osteoarthritis, Pain management, and Wears glasses. has a past surgical history that includes Foot surgery (); Hysterectomy; Spine surgery (); Nerve Block (14); Nerve Block (14); Nerve Block (14); shoulder surgery (Right; ); Colonoscopy; other surgical history; Shoulder arthroscopy (Left, 06/13/15); Total hip arthroplasty (Right, 10/12/2016); Hysterectomy, total abdominal (); joint replacement; Cardiac catheterization; and Total hip arthroplasty (Left, 11/3/2020).     Restrictions  Restrictions/Precautions  Restrictions/Precautions: Weight Bearing, General Precautions, Fall Risk, Up as Tolerated  Lower Extremity Weight Bearing Restrictions  Left Lower Extremity Weight Bearing: Weight Bearing As Tolerated  Position Activity Restriction  Other position/activity restrictions: no SLR- minimally invasive SAGRARIO LLE  Vision/Hearing  Vision: Impaired  Vision Exceptions: Wears glasses at all times  Hearing: Within functional limits     Subjective  General  Chart Reviewed: Yes  Patient assessed for rehabilitation services?: Yes  Family / Caregiver Present: No  Follows Commands: Within Functional Limits  Subjective  Subjective: Pt. sobbing-  in pain and with numb sensations that are irritating her in her \"private area\" and groin. Describes LLE heavy and \"not moving. \"  Pain Screening  Patient Currently in Pain: Yes          Orientation  Orientation  Overall Orientation Status: Within Functional Limits  Social/Functional History  Social/Functional History  Lives With: Daughter(Simultaneous filing. User may not have seen previous data.)  Type of Home: House(Simultaneous filing. User may not have seen previous data.)  Home Layout: One level(Simultaneous filing. User may not have seen previous data.)  Home Access: Stairs to enter without rails(Simultaneous filing. User may not have seen previous data.)  Entrance Stairs - Number of Steps: 2(Simultaneous filing. User may not have seen previous data.)  Bathroom Shower/Tub: Tub/Shower unit(Simultaneous filing. User may not have seen previous data.)  Bathroom Toilet: Standard(Simultaneous filing. User may not have seen previous data.)  Bathroom Equipment: Toilet raiser(Simultaneous filing. User may not have seen previous data.)  Home Equipment: Rolling walker, Cane(Simultaneous filing. User may not have seen previous data.)  ADL Assistance: Independent(Simultaneous filing. User may not have seen previous data.)  Homemaking Assistance: Independent(daughter does most of homemaking Simultaneous filing.  User may not have seen previous data.)  Ambulation Assistance: Independent(occasionally uses RW or cane Simultaneous filing. User may not have seen previous data.)  Transfer Assistance: Independent(Simultaneous filing. User may not have seen previous data.)  Active : Yes(Simultaneous filing. User may not have seen previous data.)  Mode of Transportation: Car(Simultaneous filing. User may not have seen previous data.)  Occupation: On disability(Simultaneous filing. User may not have seen previous data.)  Leisure & Hobbies: bingo, games on line(Simultaneous filing. User may not have seen previous data.)  Additional Comments: no recent falls(Simultaneous filing. User may not have seen previous data.)  Cognition   Cognition  Overall Cognitive Status: WFL    Objective     Observation/Palpation  Posture: Good  Observation: Pt. having great amt. of pain- sobbing uncontrollably. AROM RLE (degrees)  RLE AROM: WFL  AROM LLE (degrees)  LLE General AROM: 90 degrees hip flexion sitting EOB; knee/ ankle WNL  Strength LLE  Comment: - LAQ; 50% DF (Pt. states she cannont move the LLE- feels weird and heavy. Had nerve block in PACU- fascia iliaca)  Tone RLE  RLE Tone: Normotonic  Tone LLE  LLE Tone: Normotonic  Sensation  Overall Sensation Status: Impaired(numb LLE/ groin/ \"private area\")  Bed mobility  Supine to Sit: Moderate assistance;2 Person assistance  Sit to Supine: Moderate assistance;2 Person assistance  Transfers  Sit to Stand: Unable to assess  Comment: Pt. did not feel safe attempting standing as LLE numb and she had limited motor control at knee and ankle sitting EOB. Sobbing at bedside due to pain and numbness, and regretting getting nerve block. Ambulation  Ambulation?: No     Balance  Posture: Good  Sitting - Static: Good  Exercises  Ankle Pumps: 10  Comments: Encouraged pt. to ankle pump. Unable to educate at this time due to being upset.      Plan   Plan  Times per week: twice daily/ 7 days/wk  Specific instructions for Next Treatment: REASSESS  Current Treatment Recommendations: Strengthening, ROM(supine until reassessment)  Safety Devices  Type of devices: All fall risk precautions in place, Patient at risk for falls, Left in bed, Call light within reach, Bed alarm in place, Nurse notified            Goals  Short term goals  Time Frame for Short term goals: 8 visits:  Short term goal 1: Pt. to be REASSESSED in AM for standing/ gait  Short term goal 2: Pt. to be indep with SAGRARIO HEP  Patient Goals   Patient goals : no pain/ numbness       Therapy Time   Individual Concurrent Group Co-treatment   Time In 1545         Time Out 1619         Minutes 34              Treatment time:  20min  Co-treatment with OT warranted first time up day of surgery. Cotx due to potential risk of decreased sensation, muscle control and proprioception from spinal epidural and/or regional block. Decreased safety and independence requiring 2 skilled therapy professionals to address individual discipline's goals.        Rosangela Rogers, PT

## 2020-11-03 NOTE — BRIEF OP NOTE
Brief Postoperative Note      Patient: Yashira Olmedo  YOB: 1960  MRN: [id number]    Date of Procedure: 11/3/2020    Pre-Op Diagnosis: DX OA LEFT HIP    Post-Op Diagnosis: Same       Procedure(s):  LEFT HIP TOTAL ARTHROPLASTY ANTERIOR APPROACH- [address]    Surgeon(s):  Dada Borja MD    Assistant:  Surgical Assistant: Chucky Rowley; Tracy Alonso RN  First Assistant: Camryn Cronin    Anesthesia: General    Estimated Blood Loss (mL): 311     Complications: None    Specimens:   * No specimens in log *    Implants:  Implant Name Type Inv.  Item Serial No.  Lot No. LRB No. Used Action   ELIMINATOR H APEX FOR 48-60MM PINN HIP SHELL  ELIMINATOR H APEX FOR 48-60MM PINN HIP SHELL  [facility] [id number] Left 1 Implanted   CUP ACET [id number] HIP GRIPTION YU CEMENTLESS FIX SECT SER  CUP ACET SCH38OW HIP GRIPTION YU CEMENTLESS FIX SECT SER  [facility] [id number] Left 1 Implanted   LINER ACET OD52MM ID36MM +4MM OFFSET 10DEG HIP POLYETH MTL  LINER ACET OD52MM ID36MM +4MM OFFSET 10DEG HIP POLYETH MTL  [facility] [id number] Left 1 Implanted   SCREW BNE L25MM DIA6.5MM CANC HIP S STL GRIPTION FULL THRD  SCREW BNE L25MM DIA6.5MM CANC HIP S STL GRIPTION FULL THRD  [facility] [id number] Left 1 Implanted   STEM FEM SZ 5 L105MM 12/14 TAPR HI OFFSET HIP DUOFIX CLLRD  STEM FEM SZ 5 L105MM 12/14 TAPR HI OFFSET HIP DUOFIX CLLRD  [facility] [id number] Left 1 Implanted   HEAD FEM [id number] -2MM OFFSET 12/14 TAPR ARTC EZ HIP MTL  HEAD FEM [id number] -2MM OFFSET 12/14 TAPR ARTC EZ HIP MTL  [facility] [id number] Left 1 Implanted         Drains: * No LDAs found *    Findings: SEVERE OA LEFT HIP    Electronically signed by Dada Borja MD on 11/3/2020 at 10:31 AM

## 2020-11-03 NOTE — CARE COORDINATION
Case Management Initial Discharge Plan  [place],             Met with:patient to discuss discharge plans. Information verified: address, contacts, phone number, , insurance Yes  PCP: Sylvie Goyal MD  Date of last visit: 10-13-20    Insurance Provider: Jodi Fraga Dual    Discharge Planning    Living Arrangements:  Children (daughter)  Support Systems:  [id number] has 1 stories  2 stairs to climb to get into front door, 0 stairs to climb to reach second floor  Location of bedroom/bathroom in home  - main floor    Patient able to perform ADL's:Independent    Current Services (outpatient & in home) none  DME equipment: walker, cane, toilet riser  DME provider: N/A    Pharmacy: [facility]    Potential Assistance Needed:  Home Care    Patient agreeable to home care: No  [place] of choice provided:  n/a    Prior SNF/Rehab Placement and Facility: No  Agreeable to SNF/Rehab: No  [place] of choice provided: n/a   Evaluation: n/a    Expected Discharge date:  20  Patient expects to be discharged to:  home  Follow Up Appointment: Best Day/ Time:      Transportation provider: daughter  Transportation arrangements needed for discharge: No    Readmission Risk              Risk of Unplanned Readmission:        0             Does patient have a readmission risk score greater than 14?: No  If yes, follow-up appointment must be made within 7 days of discharge. Goal of Care:       Discharge Plan: Met with patient at bedside. Daughter lives with patient and has been independent. Had lt total hip replacement today with Dr. Liza Hayes. Has walker at home. Will discharge on Xarelto and pt had it filled pre op. Declines home care and stated has her daughter and everything she needs at home.   Post op appointment with Dr. Liza Hayes is 20 at 2:15pm.           Electronically signed by Kristin Vallejo RN on 11/3/20 at 3:43 PM EST

## 2020-11-03 NOTE — H&P
History and Physical Update    Pt Name: Jayseh Dee  MRN: [id number]  YOB: 1960  Date of evaluation: 11/3/2020      [x] I have reviewed the internal medicine progress note found in Epic by Dr. Billie Davis from 10/13/2020 which meets the criteria for an Interval History and Physical note. [x] I have examined  Jayesh Dee a 61 y.o., female who is scheduled for an anterior approach left total hip arthroplasty by Dr. Edita Mead due to left hip OA. The patient denies health changes since her appointment with Dr. Billie Davis on 10/13/2020. Pt states she was treated for trichomonas and a UTI after her PAT appointment on 10/15/2020. Pt denies urinary incontinence, nocturia, frequency, urgency, dysuria, gross hematuria, urinary retention, vaginal discharge, vaginal itching, fever, chills, productive cough, SOB, chest pain, open sores, rashes, and wounds. History of microscopic hematuria for the past several years and impaired glucose tolerance. Pt denies taking diabetic medications. Multiple vitamins-minerals tablet was last taken on 11/02/2020. Vital signs: /76   Pulse 72   Temp 96.9 °F (36.1 °C)   Resp 15   Ht 5' 6\" (1.676 m)   Wt 173 lb 11.6 oz (78.8 kg)   SpO2 99%   BMI 28.04 kg/m²      Allergies:  Asa [aspirin]; Dye [iodides]; Eggs or egg-derived products; Ibuprofen; Neurontin [gabapentin]; Shellfish-derived products; Shrimp flavor; and Tape [adhesive tape]    Past medical history, surgical history, social history, and family history were reviewed and updated in EPIC as indicated. Medications:    Prior to Admission medications    Medication Sig Start Date End Date Taking?  Authorizing Provider   colchicine (COLCRYS) 0.6 MG tablet take 1 tablet by mouth once daily 9/22/20  Yes Abhilash Chung MD   amLODIPine (NORVASC) 10 MG tablet Take 1 tablet by mouth daily 9/4/20  Yes Abhilash Chung MD   omeprazole (PRILOSEC) 20 MG delayed release capsule Take 1 capsule by mouth Daily take 1 capsule by mouth once daily 30-60 MINUTES BEFORE THE FIRST MEAL OF THE DAY 9/4/20  Yes Johanna Man MD   Multiple Vitamins-Minerals (THERAPEUTIC MULTIVITAMIN-MINERALS) tablet Take 1 tablet by mouth daily 9/4/20  Yes Johanna Man MD   acetaminophen (TYLENOL) 325 MG tablet Take 2 tablets by mouth every 6 hours as needed for Pain 9/4/20  Yes Johanna Man MD       This is a 61 y.o. female who is pleasant, cooperative, alert and oriented x 3, in no acute distress. Anxious. Heart: Regular rate and rhythm without murmur, gallop, or rub. Lungs: Normal respiratory effort, unlabored, and clear to auscultation without wheezes or rales bilaterally. Abdomen: Soft, non-tender, non-distended with active bowel sounds. Pedal pulses: 2+ bilaterally.          Labs:  Recent Labs     10/13/20  1155   HGB 13.0   HCT 41.6   WBC 6.4   MCV 86.1         K 4.0      CO2 25   BUN 12   CREATININE 0.71   GLUCOSE 99       Recent Labs     10/30/20  1200   COVID19 Not Detected                     Leobardo Marichuyangelito SANDERS, [facility]  Electronically signed 11/3/2020 at 6:45 AM      Johanna Man MD    Physician    Specialty:  Internal Medicine    Progress Notes    Signed    Encounter Date:  10/13/2020          Related encounter: Office Visit from 10/13/2020 in [facility] Collapse All     Show:Clear all  [x]Manual[x]Template[]Copied    Added by:  Felipe Alfonso MD    []Erwinver for details                 [facility]     Progress Note     Date of patient's visit: 10/13/2020     Patient's Name:  Truong Chavez                       YOB: 1960             Patient Care Team:  Johanna Man MD as PCP - General (Internal Medicine)  Johanna Man MD as PCP - [facility]  Jj Cabrera MD as Consulting Physician (Rheumatology)  Tonie Meza MD as Anesthesiologist (Pain Management)  Haresh King MD (Orthopedic Surgery)  Meron Powell MD as Surgeon (Orthopedic Surgery)  Marisela King MD as Anesthesiologist (Pain Management)     REASON FOR VISIT: Routine outpatient follow           Chief Complaint   Patient presents with    Pre-op Exam       Pt scheduled to have Total L Hip arthoplasty     Health Maintenance            HISTORY OF PRESENT ILLNESS:    History was obtained from the patient. Gloria Mooney is a 61 y.o. is here for evaluation prior to left total hip arthroplasty. That is going to be done next month. She has had a previous right hip surgery and has a prosthetic joint. The surgery was done 3 years ago and she had no problems perioperatively. She has no history of insulin-dependent diabetes, heart disease or strokes. She is a smoker. She has hypertension and impaired glucose tolerance. Labs are pending. She is a smoker and smokes about quarter pack a day. She is smoked on and off for 25 years. She smokes marijuana. No other drug use or alcohol abuse. She has occasional cough but denies any significant shortness of breath or cough for sputum production on a daily basis. No chest pain. She is active but cannot do heavy lifting or climbing too many stairs because of her hip issues. But she is able to complete her day-to-day tasks and is able to ambulate around stores. No leg edema. No orthopnea. Stress test 5 6 years ago was normal.  No recent fevers or coughs or cold. No urinary symptoms. She has joint pain in several joints including her shoulders and left knee.               Past Medical History   Past Medical History:   Diagnosis Date    CAD (coronary artery disease)       pt denies, CARDIAC CATH O.K., STRESS O.K.    Depression 7/30/2013    Fibromyalgia      Full dentures      Hyperlipidemia 7/30/2013    Hypertension      Obesity 7/30/2013    Osteoarthritis      Pain management       sees [facility]    Wears glasses             Past Surgical History Maintenance Due   Topic Date Due    Potassium monitoring  10/06/2018    Creatinine monitoring  10/06/2018    Breast cancer screen  01/18/2020         REVIEW OF SYSTEMS:    12 point review of symptoms completed and found to be normal except noted in the HPI     Review of Systems   Constitutional: Negative for unexpected weight change. HENT: Positive for congestion. Negative for rhinorrhea, sinus pressure and sneezing. Eyes: Negative for redness and visual disturbance. Respiratory: Negative for cough, shortness of breath and wheezing. Cardiovascular: Negative for chest pain, palpitations and leg swelling. Gastrointestinal: Negative for abdominal pain, constipation and diarrhea. Endocrine: Negative for polydipsia and polyuria. Genitourinary: Negative for dysuria, flank pain, hematuria and urgency. Musculoskeletal: Positive for arthralgias. Negative for back pain. Allergic/Immunologic: Positive for environmental allergies. Negative for immunocompromised state. Neurological: Negative for dizziness, syncope, weakness, numbness and headaches. Hematological: Negative for adenopathy. Does not bruise/bleed easily. Psychiatric/Behavioral: Negative for dysphoric mood. The patient is nervous/anxious. PHYSICAL EXAM:      Vitals   Vitals:     10/13/20 1058   BP: 120/81   Site: Right Upper Arm   Position: Sitting   Cuff Size: Medium Adult   Pulse: 87   Weight: 175 lb (79.4 kg)   Height: 5' 6\" (1.676 m)        Body mass index is 28.25 kg/m². BP Readings from Last 3 Encounters:   10/13/20 120/81   02/11/20 132/83   09/03/19 133/82             Wt Readings from Last 3 Encounters:   10/13/20 175 lb (79.4 kg)   02/11/20 170 lb (77.1 kg)   09/03/19 171 lb (77.6 kg)         Physical Exam  Vitals signs and nursing note reviewed. Constitutional:       Appearance: Normal appearance. HENT:      Head: Normocephalic and atraumatic. Eyes:      General:         Left eye: No discharge. done.     3. Primary osteoarthritis of both hips  Patient stable for surgery. Needs EKG perioperatively along with labs. - EKG 12 lead; Future     4. Preop examination     - EKG 12 lead; Future     5. Smoker  Discussed smoking cessation which she says she will try. FOLLOW UP AND INSTRUCTIONS: 1. Return in about 3 months (around 1/13/2021). 1. Felicita received counseling on the following healthy behaviors: nutrition, exercise and medication adherence     2. Reviewed prior labs and health maintenance. 3. Discussed use, benefit, and side effects of prescribed medications. Barriers to medication compliance addressed. All patient questions answered. Pt voiced understanding.             Gabrielle Ramirez  Attending Physician, [address] [place] [address], Internal Medicine Residency Program  [address] [place] [address]  10/13/2020, 11:16 AM

## 2020-11-03 NOTE — PHYSICIAN ADVISORY
The patient was counseled at length about the risks of cindy Covid-19 during their perioperative period and any recovery window from their procedure. The patient was made aware that cindy Covid-19  may worsen their prognosis for recovering from their procedure  and lend to a higher morbidity and/or mortality risk. All material risks, benefits, and reasonable alternatives including postponing the procedure were discussed. The patient does wish to proceed with the procedure at this time.

## 2020-11-03 NOTE — PROGRESS NOTES
Occupational Therapy   Occupational Therapy Initial Assessment  Date: 11/3/2020   Patient Name: Isidro Goltz  MRN: [id number]     : 1960    Date of Service: 11/3/2020    Discharge Recommendations:  Continue to assess pending progress     RN reports patient is medically stable for therapy treatment this date. Chart reviewed prior to treatment and patient is agreeable for therapy. All lines intact and patient positioned comfortably at end of treatment. All patient needs addressed prior to ending therapy session. Assessment   Performance deficits / Impairments: Decreased functional mobility ; Decreased ADL status; Decreased strength;Decreased safe awareness;Decreased balance;Decreased endurance  Prognosis: Good  Decision Making: Medium Complexity  OT Education: OT Role;Plan of Care;Home Exercise Program;Precautions; ADL Adaptive Strategies;Transfer Training;Energy Conservation;Equipment; Family Education  Patient Education: attempting to ed on SAGRARIO precautions, positioning, techs for bed mob, and pain management. Barriers to Learning: pain and tearfullness  REQUIRES OT FOLLOW UP: Yes  Activity Tolerance  Activity Tolerance: Patient limited by pain(tearfulness)  Safety Devices  Safety Devices in place: Yes  Type of devices: Call light within reach;Nurse notified; Left in bed;Patient at risk for falls;Gait belt; All fall risk precautions in place; Bed alarm in place           Patient Diagnosis(es): There were no encounter diagnoses. has a past medical history of CAD (coronary artery disease), Depression, Fibromyalgia, Full dentures, GERD (gastroesophageal reflux disease), Gout, History of blood transfusion, History of hematuria, Hyperlipidemia, Hypertension, Obesity, Osteoarthritis, Pain management, and Wears glasses. has a past surgical history that includes Foot surgery (); Hysterectomy; Spine surgery (); Nerve Block (14); Nerve Block (14);  Nerve Block (14); shoulder surgery (Right; 2011); Colonoscopy; other surgical history; Shoulder arthroscopy (Left, 06/13/15); Total hip arthroplasty (Right, 10/12/2016); Hysterectomy, total abdominal (1997); joint replacement; Cardiac catheterization; and Total hip arthroplasty (Left, 11/3/2020). Restrictions  Restrictions/Precautions  Restrictions/Precautions: Weight Bearing, General Precautions, Fall Risk, Up as Tolerated  Lower Extremity Weight Bearing Restrictions  Left Lower Extremity Weight Bearing: Weight Bearing As Tolerated  Position Activity Restriction  Other position/activity restrictions: no SLR- minimally invasive SAGRARIO LLE    Subjective   General  Chart Reviewed: Yes  Patient assessed for rehabilitation services?: Yes  Family / Caregiver Present: No  Patient Currently in Pain: Yes  Pain Assessment  Pain Assessment: 0-10  Pain Level: 8  Response to Pain Intervention: Patient Satisfied  Vital Signs  Temp: 98.4 °F (36.9 °C)  Temp Source: Oral  Pulse: 69  Heart Rate Source: Monitor  Resp: 16  BP: 123/69  BP Location: Left Arm  BP Upper/Lower: Upper  MAP (mmHg): 79  Level of Consciousness: Alert  Patient Currently in Pain: Yes  Oxygen Therapy  SpO2: 96 %  O2 Device: None (Room air)  Social/Functional History  Social/Functional History  Lives With: Daughter(Simultaneous filing. User may not have seen previous data.)  Type of Home: House(Simultaneous filing. User may not have seen previous data.)  Home Layout: One level(Simultaneous filing. User may not have seen previous data.)  Home Access: Stairs to enter without rails(Simultaneous filing. User may not have seen previous data.)  Entrance Stairs - Number of Steps: 2(Simultaneous filing. User may not have seen previous data.)  Bathroom Shower/Tub: Tub/Shower unit(Simultaneous filing. User may not have seen previous data.)  Bathroom Toilet: Standard(Simultaneous filing. User may not have seen previous data.)  Bathroom Equipment: Toilet raiser(Simultaneous filing.  User may not have seen previous data.)  Home Equipment: Rolling walker, Cane(Simultaneous filing. User may not have seen previous data.)  ADL Assistance: Independent(Simultaneous filing. User may not have seen previous data.)  Homemaking Assistance: Independent(daughter does most of homemaking Simultaneous filing. User may not have seen previous data.)  Ambulation Assistance: Independent(occasionally uses RW or cane Simultaneous filing. User may not have seen previous data.)  Transfer Assistance: Independent(Simultaneous filing. User may not have seen previous data.)  Active : Yes(Simultaneous filing. User may not have seen previous data.)  Mode of Transportation: Car(Simultaneous filing. User may not have seen previous data.)  Occupation: On disability(Simultaneous filing. User may not have seen previous data.)  Leisure & Hobbies: bingo, games on line(Simultaneous filing. User may not have seen previous data.)  Additional Comments: no recent falls(Simultaneous filing. User may not have seen previous data.)       Objective   Vision: Impaired  Vision Exceptions: Wears glasses at all times  Hearing: Within functional limits    Orientation  Overall Orientation Status: Within Functional Limits  Observation/Palpation  Posture: Good  Observation: Pt. having great amt. of pain- sobbing uncontrollably. Balance  Sitting Balance: Contact guard assistance(pt sat EOB x 5 min, but writhing and sobbing in pain and discomfort. Pt holding leg in internal rotation and unable to dorsiflex. Pt is unable to tolerate safe attempt at standing.)  Standing Balance: Unable to assess(comment)  Functional Mobility  Functional Mobility Comments: unable- will reassess transfers/mob in am. RN aware  ADL  Feeding: Setup;Supervision  Grooming: Minimal assistance  UE Bathing: Minimal assistance; Moderate assistance  LE Bathing: Maximum assistance  UE Dressing: Minimal assistance; Moderate assistance  LE Dressing: Maximum assistance  Toileting: Maximum assistance  Additional Comments: pt has poor tolerance this session for all ADLs. Pt writhing in pain/\"sobbing\" throughout eval- in lying and in stting. Unable to safely attempt stand. Tone RUE  RUE Tone: Normotonic  Tone LUE  LUE Tone: Normotonic  Coordination  Movements Are Fluid And Coordinated: Yes     Bed mobility  Supine to Sit: Moderate assistance;2 Person assistance  Sit to Supine:  Moderate assistance;2 Person assistance  Comment: max verbal cues for tech for sup<> sit  Transfers  Sit to stand: Unable to assess  Stand to sit: Unable to assess     Cognition  Overall Cognitive Status: WFL  Perception  Overall Perceptual Status: WFL     Sensation  Overall Sensation Status: Impaired(numb LLE/ groin/ \"private area\")        LUE AROM (degrees)  LUE AROM : WFL  RUE AROM (degrees)  RUE AROM : WFL  LUE Strength  Gross LUE Strength: WFL  LUE Strength Comment: L UE 4/5  RUE Strength  Gross RUE Strength: WFL  RUE Strength Comment: R shoulder 4-/5, elbow/4/5                   Plan   Plan  Times per week: 5x/week, 1-2x/day  Current Treatment Recommendations: Strengthening, Balance Training, Functional Mobility Training, Endurance Training, Safety Education & Training, Self-Care / ADL, Patient/Caregiver Education & Training, Equipment Evaluation, Education, & procurement, Positioning, Pain Management         Goals  Short term goals  Time Frame for Short term goals: by discharge, pt will  Short term goal 1: tolerate reassessment of ADL transfers/functional mob  Short term goal 2: demo S/MI with toileting routine with good safety  Short term goal 3: demo I with UB ADLs and min A with LB ADLs with good safety, AE/DME as needed  Short term goal 4: demo and verb good understanding of fall prevention techs, EC/WS techs, SAGRARIO precautions, and possible equip needs for home  Patient Goals   Patient goals : to go home       Therapy Time   Individual Concurrent Group Co-treatment   Time In 1535         Time Out 1609         Minutes 34 Co-treatment with PT warranted first time up day of surgery. Cotx due to potential risk of decreased sensation, muscle control and proprioception from spinal epidural and/or regional block. Decreased safety and independence requiring 2 skilled therapy professionals to address individual discipline's goals. Pt educated on course of therapy at hospital, fall prevention techs, walker/equip safety, dressing, use of support hose, infection protection, and possible home needs inc. Possible equip needs for ADLs/IADLs.      Addie Silva, OT

## 2020-11-03 NOTE — ANESTHESIA PROCEDURE NOTES
Peripheral Block    Patient location during procedure: PACU  Start time: 11/3/2020 12:30 PM  End time: 11/3/2020 12:35 PM  Staffing  Anesthesiologist: Britt Hudson MD  Performed: anesthesiologist   Preanesthetic Checklist  Completed: patient identified, site marked, surgical consent, pre-op evaluation, timeout performed, IV checked, risks and benefits discussed, monitors and equipment checked, anesthesia consent given, oxygen available and patient being monitored  Peripheral Block  Patient position: supine  Prep: ChloraPrep  Patient monitoring: cardiac monitor, continuous pulse ox, frequent blood pressure checks and IV access  Block type: Fascia iliaca  Laterality: left  Injection technique: single-shot  Procedures: ultrasound guided  Provider prep: mask and sterile gloves  Needle  Needle type: combined needle/nerve stimulator   Needle gauge: 21 G  Needle length: 10 cm  Needle localization: ultrasound guidance  Assessment  Injection assessment: negative aspiration for heme, no paresthesia on injection and local visualized surrounding nerve on ultrasound  Paresthesia pain: none  Slow fractionated injection: yes  Hemodynamics: stable  Additional Notes  U/S 23755.  (1) Under ultrasound guidance, a  gauge needle was inserted and placed in close proximity to the nerve.  (2) Ultrasound was also used to visualize the spread of the anesthetic in close proximity to the nerve being blocked.        Reason for block: post-op pain management and at surgeon's request

## 2020-11-03 NOTE — PROGRESS NOTES
Time out completed. Pt prepared for block. Dr Gary June to bedside and informed pt of procedure. Pt tolerated well. Resting comfortable after block.   She charted vitals

## 2020-11-03 NOTE — PLAN OF CARE
Problem: Pain:  Goal: Pain level will decrease  Description: Pain level will decrease  11/3/2020 1516 by Desi Gill RN  Outcome: Ongoing  Note: Pt medicated with pain medication prn. Assessed all pain characteristics including level, type, location, frequency, and onset. Non-pharmacologic interventions offered to pt as well. Pt states pain is tolerable at this time. Will continue to monitor      Problem: Falls - Risk of:  Goal: Will remain free from falls  Description: Will remain free from falls  11/3/2020 1516 by Desi Gill RN  Outcome: Ongoing  Note: Patient is a fall risk during this admission. Fall risk assessment was performed. Patient is absent of falls. Bed is in the lowest position. Wheels on the bed are locked. Call light and bed side table are within reach. Clutter is removed. Patient was educated to call out when needing assistance or wanting to get out of bed. Patient offered toileting assistance during rounding. Hourly rounds have been performed.

## 2020-11-03 NOTE — ANESTHESIA POSTPROCEDURE EVALUATION
Department of Anesthesiology  Postprocedure Note    Patient: Truong Chavez  MRN: [id number]  YOB: 1960  Date of evaluation: 11/3/2020  Time:  1:24 PM     Procedure Summary     Date:  11/03/20 Room / Location:  [place] [address] / [facility] - INPATIENT    Anesthesia Start:  0740 Anesthesia Stop:  1027    Procedure:  LEFT HIP TOTAL ARTHROPLASTY ANTERIOR APPROACH- Felix Saint (Left Hip) Diagnosis:  (DX OA LEFT HIP)    Surgeon:  Abdulkadir Yoo MD Responsible Provider:  Yenni Ward MD    Anesthesia Type:  general ASA Status:  3          Anesthesia Type: general    Geeta Phase I: Geeta Score: 7    Geeta Phase II:      Last vitals: Reviewed and per EMR flowsheets.        Anesthesia Post Evaluation    Patient location during evaluation: PACU  Level of consciousness: awake and alert  Complications: no

## 2020-11-03 NOTE — PROGRESS NOTES
Report received from Komal Modi [facility]. Pt admitted to room. Oriented to room, call light and bed mechanics. Side rails up x2. Call light within reach. Orders reviewed.

## 2020-11-04 VITALS
RESPIRATION RATE: 16 BRPM | HEIGHT: 66 IN | HEART RATE: 68 BPM | SYSTOLIC BLOOD PRESSURE: 118 MMHG | BODY MASS INDEX: 27.92 KG/M2 | WEIGHT: 173.72 LBS | DIASTOLIC BLOOD PRESSURE: 62 MMHG | OXYGEN SATURATION: 100 % | TEMPERATURE: 97.9 F

## 2020-11-04 PROBLEM — M16.12 PRIMARY OSTEOARTHRITIS OF LEFT HIP: Chronic | Status: RESOLVED | Noted: 2020-11-03 | Resolved: 2020-11-04

## 2020-11-04 PROBLEM — Z96.642 STATUS POST TOTAL REPLACEMENT OF LEFT HIP: Status: RESOLVED | Noted: 2020-11-03 | Resolved: 2020-11-04

## 2020-11-04 PROCEDURE — 97110 THERAPEUTIC EXERCISES: CPT

## 2020-11-04 PROCEDURE — 2580000003 HC RX 258: Performed by: ORTHOPAEDIC SURGERY

## 2020-11-04 PROCEDURE — 97530 THERAPEUTIC ACTIVITIES: CPT

## 2020-11-04 PROCEDURE — 97535 SELF CARE MNGMENT TRAINING: CPT

## 2020-11-04 PROCEDURE — 6360000002 HC RX W HCPCS: Performed by: ORTHOPAEDIC SURGERY

## 2020-11-04 PROCEDURE — 6370000000 HC RX 637 (ALT 250 FOR IP): Performed by: ORTHOPAEDIC SURGERY

## 2020-11-04 PROCEDURE — 97116 GAIT TRAINING THERAPY: CPT

## 2020-11-04 RX ORDER — OXYCODONE HYDROCHLORIDE AND ACETAMINOPHEN 5; 325 MG/1; MG/1
1 TABLET ORAL EVERY 6 HOURS PRN
Qty: 28 TABLET | Refills: 0
Start: 2020-11-04 | End: 2020-11-11

## 2020-11-04 RX ORDER — RIVAROXABAN 10 MG/1
10 TABLET, FILM COATED ORAL
Qty: 21 TABLET | Refills: 0
Start: 2020-11-04 | End: 2021-03-16 | Stop reason: ALTCHOICE

## 2020-11-04 RX ADMIN — ACETAMINOPHEN 650 MG: 325 TABLET ORAL at 09:07

## 2020-11-04 RX ADMIN — OXYCODONE HYDROCHLORIDE 10 MG: 5 TABLET ORAL at 04:26

## 2020-11-04 RX ADMIN — OXYCODONE HYDROCHLORIDE 10 MG: 5 TABLET ORAL at 09:06

## 2020-11-04 RX ADMIN — HYDROMORPHONE HYDROCHLORIDE 0.5 MG: 1 INJECTION, SOLUTION INTRAMUSCULAR; INTRAVENOUS; SUBCUTANEOUS at 02:34

## 2020-11-04 RX ADMIN — HYDROMORPHONE HYDROCHLORIDE 0.5 MG: 1 INJECTION, SOLUTION INTRAMUSCULAR; INTRAVENOUS; SUBCUTANEOUS at 07:20

## 2020-11-04 RX ADMIN — ACETAMINOPHEN 650 MG: 325 TABLET ORAL at 02:34

## 2020-11-04 RX ADMIN — HYDROMORPHONE HYDROCHLORIDE 0.5 MG: 1 INJECTION, SOLUTION INTRAMUSCULAR; INTRAVENOUS; SUBCUTANEOUS at 12:22

## 2020-11-04 RX ADMIN — Medication 10 ML: at 12:22

## 2020-11-04 ASSESSMENT — PAIN DESCRIPTION - LOCATION: LOCATION: HIP

## 2020-11-04 ASSESSMENT — PAIN DESCRIPTION - PROGRESSION
CLINICAL_PROGRESSION: GRADUALLY WORSENING
CLINICAL_PROGRESSION: NOT CHANGED
CLINICAL_PROGRESSION: GRADUALLY WORSENING

## 2020-11-04 ASSESSMENT — PAIN SCALES - GENERAL
PAINLEVEL_OUTOF10: 7
PAINLEVEL_OUTOF10: 9
PAINLEVEL_OUTOF10: 10
PAINLEVEL_OUTOF10: 6
PAINLEVEL_OUTOF10: 9
PAINLEVEL_OUTOF10: 6
PAINLEVEL_OUTOF10: 7
PAINLEVEL_OUTOF10: 9

## 2020-11-04 ASSESSMENT — PAIN - FUNCTIONAL ASSESSMENT: PAIN_FUNCTIONAL_ASSESSMENT: PREVENTS OR INTERFERES SOME ACTIVE ACTIVITIES AND ADLS

## 2020-11-04 ASSESSMENT — PAIN DESCRIPTION - ONSET: ONSET: ON-GOING

## 2020-11-04 ASSESSMENT — PAIN DESCRIPTION - DESCRIPTORS: DESCRIPTORS: CRAMPING;DISCOMFORT

## 2020-11-04 ASSESSMENT — PAIN DESCRIPTION - PAIN TYPE: TYPE: ACUTE PAIN;SURGICAL PAIN

## 2020-11-04 ASSESSMENT — PAIN DESCRIPTION - FREQUENCY: FREQUENCY: CONTINUOUS

## 2020-11-04 ASSESSMENT — PAIN DESCRIPTION - ORIENTATION: ORIENTATION: LEFT

## 2020-11-04 NOTE — PROGRESS NOTES
Physical Therapy    Facility/Department: [facility] MED SURG  Reassessment    NAME: Rocio Rick  : 1960  MRN: [id number]    Date of Service: 2020    Discharge Recommendations:  Home with assist PRN      SAGRARIO, min. Inv. Ant. LLE by Dr. Ruth Cabrera, 11/3/20  Assessment   Body structures, Functions, Activity limitations: Decreased functional mobility ; Decreased strength;Decreased balance  Assessment: Towards end of reassess today, pt. feeling much better and pleased with how she was responding physically (ther ex/ gait/ up to chair.)  Will return in PM to practice curb step to prepare pt. for d/c home  Specific instructions for Next Treatment: curb step  Prognosis: Excellent  Decision Making: Medium Complexity  PT Education: Goals;PT Role;Plan of Care  REQUIRES PT FOLLOW UP: Yes  Activity Tolerance  Activity Tolerance: Patient Tolerated treatment well       Patient Diagnosis(es): The primary encounter diagnosis was Primary osteoarthritis of left hip. A diagnosis of Status post total replacement of left hip was also pertinent to this visit. has a past medical history of CAD (coronary artery disease), Depression, Fibromyalgia, Full dentures, GERD (gastroesophageal reflux disease), Gout, History of blood transfusion, History of hematuria, Hyperlipidemia, Hypertension, Obesity, Osteoarthritis, Pain management, and Wears glasses. has a past surgical history that includes Foot surgery (); Hysterectomy; Spine surgery (); Nerve Block (14); Nerve Block (14); Nerve Block (14); shoulder surgery (Right; ); Colonoscopy; other surgical history; Shoulder arthroscopy (Left, 06/13/15); Total hip arthroplasty (Right, 10/12/2016); Hysterectomy, total abdominal (); joint replacement; Cardiac catheterization; and Total hip arthroplasty (Left, 11/3/2020).     Restrictions  Restrictions/Precautions  Restrictions/Precautions: Weight Bearing, General Precautions, Fall Risk, Up as Tolerated  Required Braces or Orthoses?: No  Lower Extremity Weight Bearing Restrictions  Left Lower Extremity Weight Bearing: Weight Bearing As Tolerated  Position Activity Restriction  Other position/activity restrictions: no SLR- minimally invasive SAGRARIO LLE  Vision/Hearing  Vision: Impaired  Vision Exceptions: Wears glasses at all times  Hearing: Within functional limits     Subjective  General  Chart Reviewed: Yes  Patient assessed for rehabilitation services?: Yes  Family / Caregiver Present: No  Follows Commands: Within Functional Limits  General Comment  Comments: Pt. still upset this AM, however after getting up and moving and with max. encouragement from therapy, pt. apperared to be feeling much better. Orientation  Orientation  Overall Orientation Status: Within Functional Limits  Social/Functional History  Social/Functional History  Lives With: Daughter  Type of Home: House  Home Layout: One level  Home Access: Stairs to enter without rails  Entrance Stairs - Number of Steps: 2  Bathroom Shower/Tub: Tub/Shower unit  Bathroom Toilet: Standard  Bathroom Equipment: Toilet raiser  Home Equipment: Rolling walker, Cane  ADL Assistance: Independent  Homemaking Assistance: Independent(daughter does most of homemaking )  Ambulation Assistance: Independent(occasionally uses RW or cane )  Transfer Assistance: Independent  Active : Yes  Mode of Transportation: Car  Occupation: On disability  Leisure & Hobbies: bingo, games on line  Additional Comments: no recent falls  Cognition   Cognition  Overall Cognitive Status: WFL    Objective     Observation/Palpation  Posture: Good  Observation: pt continuing to be labile at start of session, but able to participate with some reassurance.  Pt continuing to have numbness in L LE, but is able throughout session safely wt bear and use RW;  IV, Soo    AROM RLE (degrees)  RLE AROM: WFL  AROM LLE (degrees)  LLE General AROM: 90 degrees hip flexion sitting EOB; knee/ ankle WNL  Strength LLE  Comment: difficulty initiating heel slide initially today, POD 1, however after AAROM x 5, pt. able to start to do more herself. This same with floor heel slides and LAQ. After sev. reps, AAROM, pt. able to take over AROM. Bed mobility  Supine to Sit: Contact guard assistance(used sheet as leg ; good demo)  Comment: up to chair  Transfers  Sit to Stand: Minimal Assistance  Stand to sit: Minimal Assistance  Ambulation  Ambulation?: Yes  Ambulation 1  Surface: level tile  Device: Rolling Walker  Assistance: Contact guard assistance  Quality of Gait: good demo step-to gait with RW  Distance: 25ft. Comments: up to chair with chair alarm     Balance  Posture: Good  Sitting - Static: Good  Standing - Static: Good;-(RW)  Exercises  Quad Sets: 6  Heelslides: 10  Ankle Pumps: 10     Plan   Plan  Times per week: twice daily/ 7 days/wk  Specific instructions for Next Treatment: curb step  Current Treatment Recommendations: Strengthening, ROM, Balance Training, Functional Mobility Training, Transfer Training, Gait Training, Stair training, Patient/Caregiver Education & Training, Safety Education & Training, Home Exercise Program  Safety Devices  Type of devices: All fall risk precautions in place, Patient at risk for falls, Call light within reach, Nurse notified, Left in chair, Chair alarm in place            Goals  Short term goals  Time Frame for Short term goals: 8 visits:  Short term goal 1: Pt. to be indep with bed mob. using sheet as leg  as needed for LLE  Short term goal 2: Pt. to be indep with SAGRARIO HEP  Short term goal 3: Pt. to be indep with gait with RW, 100ft. Short term goal 4: Pt. to be indep with curb step with RW  Patient Goals   Patient goals : no pain/ numbness       Therapy Time   Individual Concurrent Group Co-treatment   Time In 0850         Time Out 0954         Minutes 64              Treatment time:  50min. Returned in PM from [phone] for PM treatment to prepare pt.  For

## 2020-11-04 NOTE — PLAN OF CARE
Problem: Discharge Planning:  Goal: Knowledge of discharge instructions  Description: Knowledge of discharge instructions  11/4/2020 1602 by Que Flight, RN  Outcome: Met This Shift  11/4/2020 0325 by Kiran Adams RN  Outcome: Ongoing     Problem: Infection - Surgical Site:  Goal: Signs of wound healing will improve  Description: Signs of wound healing will improve  11/4/2020 1602 by Que Flight, RN  Outcome: Met This Shift  11/4/2020 0325 by Kiran Adams RN  Outcome: Ongoing     Problem: Pain:  Goal: Pain level will decrease  Description: Pain level will decrease  11/4/2020 1602 by Que Davis RN  Outcome: Ongoing  11/4/2020 0325 by Kiran Adams RN  Outcome: Ongoing  Goal: Control of acute pain  Description: Control of acute pain  11/4/2020 1602 by Que Davis RN  Outcome: Ongoing  11/4/2020 0325 by Kiran Adams RN  Outcome: Ongoing  Goal: Control of chronic pain  Description: Control of chronic pain  11/4/2020 1602 by Que Davis RN  Outcome: Ongoing  11/4/2020 0325 by Kiran Adams RN  Outcome: Ongoing     Problem: Falls - Risk of:  Goal: Will remain free from falls  Description: Will remain free from falls  11/4/2020 1602 by Que Davis RN  Outcome: Ongoing  11/4/2020 0325 by Kiran Adams RN  Outcome: Ongoing  Goal: Absence of physical injury  Description: Absence of physical injury  11/4/2020 1602 by Que Davis RN  Outcome: Ongoing  11/4/2020 0325 by Kiran Adams RN  Outcome: Ongoing     Problem: Mobility - Impaired:  Goal: Achieve maximum mobility level  Description: Achieve maximum mobility level  11/4/2020 1602 by Que Davis RN  Outcome: Ongoing  11/4/2020 0325 by Kiran Adams RN  Outcome: Ongoing     Problem: Pain - Acute:  Goal: Pain level will decrease  Description: Pain level will decrease  11/4/2020 1602 by Que Davis RN  Outcome: Ongoing  11/4/2020 0325 by Kiran Adams RN  Outcome: Ongoing

## 2020-11-04 NOTE — PROGRESS NOTES
Patient discharged to home in good condition. Discharge instructions given and all belongings in patient possession at time of discharge. Patient denies any further questions. Patient escorted to vehicle via wheelchair.

## 2020-11-04 NOTE — PROGRESS NOTES
Occupational Therapy  Facility/Department: [facility] MED SURG  Daily Treatment Note  NAME: Joseph Samayoa  : 1960  MRN: [id number]    Date of Service: 2020    Discharge Recommendations:  Home with assist PRN       Assessment   Performance deficits / Impairments: Decreased functional mobility ; Decreased ADL status; Decreased strength;Decreased safe awareness;Decreased balance;Decreased endurance  Prognosis: Good  OT Education: Precautions;Transfer Training;Energy Conservation  Patient Education: Pt issued handouts on SAGRARIO precautions, fall prevention and energy conservation. Barriers to Learning: None, pt is calmer and did very well. REQUIRES OT FOLLOW UP: Yes  Activity Tolerance  Activity Tolerance: Patient Tolerated treatment well  Safety Devices  Safety Devices in place: Yes  Type of devices: Left in bed;Bed alarm in place;Nurse notified;Call light within reach;Gait belt;Patient at risk for falls         Patient Diagnosis(es): There were no encounter diagnoses. has a past medical history of CAD (coronary artery disease), Depression, Fibromyalgia, Full dentures, GERD (gastroesophageal reflux disease), Gout, History of blood transfusion, History of hematuria, Hyperlipidemia, Hypertension, Obesity, Osteoarthritis, Pain management, and Wears glasses. has a past surgical history that includes Foot surgery (); Hysterectomy; Spine surgery (); Nerve Block (14); Nerve Block (14); Nerve Block (14); shoulder surgery (Right; ); Colonoscopy; other surgical history; Shoulder arthroscopy (Left, 06/13/15); Total hip arthroplasty (Right, 10/12/2016); Hysterectomy, total abdominal (); joint replacement; Cardiac catheterization; and Total hip arthroplasty (Left, 11/3/2020).     Restrictions  Restrictions/Precautions  Restrictions/Precautions: Weight Bearing, General Precautions, Fall Risk, Up as Tolerated  Required Braces or Orthoses?: No  Lower Extremity Weight Bearing Restrictions  Left Lower Extremity Weight Bearing: Weight Bearing As Tolerated  Position Activity Restriction  Other position/activity restrictions: no SLR- minimally invasive SAGRARIO LLE  Subjective   General  Chart Reviewed: Yes  Patient assessed for rehabilitation services?: Yes  Response to previous treatment: Patient with no complaints from previous session  Family / Caregiver Present: No      Orientation  Orientation  Overall Orientation Status: Within Normal Limits  Objective    ADL  UE Bathing: Setup;Supervision  LE Bathing: Setup;Minimal assistance  UE Dressing: Setup; Independent  LE Dressing: Setup; Moderate assistance  Additional Comments: Pt showered using JOSH HEX 4 surgical soap. Balance  Sitting Balance: Independent  Standing Balance: Stand by assistance  Functional Mobility  Functional - Mobility Device: Rolling Walker  Activity: To/from bathroom  Assist Level: Stand by assistance  Functional Mobility Comments: No LOB noted  Shower Transfers  Shower - Transfer From: Marcelino Ast - Transfer Type: To and From  Shower - Transfer To:  Transfer tub bench  Shower - Technique: Ambulating  Shower Transfers: Contact Guard  Shower Transfers Comments: Verbal cues for safety/technique  Bed mobility  Sit to Supine: Stand by assistance  Scooting: Stand by assistance  Transfers  Stand Step Transfers: Stand by assistance  Sit to stand: Stand by assistance  Stand to sit: Stand by assistance  Transfer Comments: Min cues for safety/technique                       Cognition  Overall Cognitive Status: WFL     Perception  Overall Perceptual Status: [facility]                                   Plan   Plan  Times per week: 5x/week, 1-2x/day  Current Treatment Recommendations: Strengthening, Balance Training, Functional Mobility Training, Endurance Training, Safety Education & Training, Self-Care / ADL, Patient/Caregiver Education & Training, Equipment Evaluation, Education, & procurement, Positioning, Pain Management           AM-PAC Score AM-PAC Inpatient Daily Activity Raw Score: 21 (11/04/20 1121)  AM-PAC Inpatient ADL T-Scale Score : 44.27 (11/04/20 1121)  ADL Inpatient CMS 0-100% Score: 32.79 (11/04/20 1121)  ADL Inpatient CMS G-Code Modifier : CJ (11/04/20 1121)    Goals  Short term goals  Time Frame for Short term goals: by discharge, pt will  Short term goal 1: tolerate reassessment of ADL transfers/functional mob  Short term goal 2: demo S/MI with toileting routine with good safety  Short term goal 3: demo I with UB ADLs and min A with LB ADLs with good safety, AE/DME as needed  Short term goal 4: demo and verb good understanding of fall prevention techs, EC/WS techs, SAGRARIO precautions, and possible equip needs for home  Patient Goals   Patient goals : to go home       Therapy Time   Individual Concurrent Group Co-treatment   Time In 1024         Time Out 1119         Minutes [address], AMBREEN

## 2020-11-04 NOTE — PROGRESS NOTES
Ortho POD # 1 and Discharge    Tearful, pain control only fair with orals.  Declines gabapentin because of hx dysphoria  Afebrile, Vital signs stable  Waterproof dressing intact; no skin redness at the site  Neurovascular findings normal, negative Alice sign  Okay for discharge today after PT & OT  Follow up as scheduled  Pain medication Rx and VTE prophylaxis Rx dispensed

## 2020-11-04 NOTE — PLAN OF CARE
Problem: Pain:  Goal: Pain level will decrease  Description: Pain level will decrease  11/4/2020 0325 by Nellie Elizabeth RN  Outcome: Ongoing     Problem: Pain:  Goal: Control of acute pain  Description: Control of acute pain  11/4/2020 0325 by Nellie Elizabeth RN  Outcome: Ongoing     Problem: Pain:  Goal: Control of chronic pain  Description: Control of chronic pain  11/4/2020 0325 by Nellie Elizabeth RN  Outcome: Ongoing     Problem: Falls - Risk of:  Goal: Will remain free from falls  Description: Will remain free from falls  11/4/2020 0325 by Nellie Elizabeth RN  Outcome: Ongoing     Problem: Falls - Risk of:  Goal: Absence of physical injury  Description: Absence of physical injury  11/4/2020 0325 by Nellie Elizabeth RN  Outcome: Ongoing     Problem: Discharge Planning:  Goal: Knowledge of discharge instructions  Description: Knowledge of discharge instructions  11/4/2020 0325 by Nellie Elizabeth RN  Outcome: Ongoing     Problem: Infection - Surgical Site:  Goal: Signs of wound healing will improve  Description: Signs of wound healing will improve  11/4/2020 0325 by Nellie Elizabeth RN  Outcome: Ongoing     Problem: Mobility - Impaired:  Goal: Achieve maximum mobility level  Description: Achieve maximum mobility level  11/4/2020 0325 by Nellie Elizabeth RN  Outcome: Ongoing     Problem: Pain - Acute:  Goal: Pain level will decrease  Description: Pain level will decrease  11/4/2020 0325 by Nellie Elizabeth RN  Outcome: Ongoing

## 2020-11-04 NOTE — DISCHARGE INSTR - COC
Right; 2011    SPINE SURGERY  2009    TOTAL HIP ARTHROPLASTY Right 10/12/2016    TOTAL HIP ARTHROPLASTY Left 11/3/2020    LEFT HIP TOTAL ARTHROPLASTY ANTERIOR APPROACH- DEPUY performed by Noble Prader, MD at [facility]       Immunization History: There is no immunization history on file for this patient.     Active Problems:  Patient Active Problem List   Diagnosis Code    Osteoarthritis of both knees M17.0    Osteoarthritis of both shoulders M19.011, M19.012    DJD (degenerative joint disease), lumbosacral M47.817    Essential hypertension I10    Hyperlipidemia E78.5    Depression F32.9    Obesity E66.9    Postlaminectomy syndrome, lumbar region M96.1    Shoulder bursitis M75.50    S/P lumbar spinal fusion Z98.1    Encounter for medication monitoring Z51.81    Chronic pain syndrome G89.4    Posterior dislocation of hip (HCC) S73.016A    History of total right hip replacement Z96.641    Frequent PVCs I49.3    Acute cystitis without hematuria N30.00    Primary osteoarthritis of both hips M16.0    Right hip pain M25.551    History of total right hip arthroplasty Z96.641    Hip instability M25.359    Instability of prosthetic hip (HCC) T84.028A, Z96.649       Isolation/Infection:   Isolation          No Isolation        Patient Infection Status     Infection Onset Added Last Indicated Last Indicated By Review Planned Expiration Resolved Resolved By    None active    Resolved    COVID-19 Rule Out 10/30/20 10/30/20 10/30/20 COVID-19 (Ordered)   10/31/20 Rule-Out Test Resulted          Nurse Assessment:  Last Vital Signs: /62   Pulse 68   Temp 97.9 °F (36.6 °C) (Oral)   Resp 16   Ht 5' 6\" (1.676 m)   Wt 173 lb 11.6 oz (78.8 kg)   SpO2 100%   BMI 28.04 kg/m²     Last documented pain score (0-10 scale): Pain Level: 7  Last Weight:   Wt Readings from Last 1 Encounters:   11/03/20 173 lb 11.6 oz (78.8 kg)     Mental Status:  {IP PT MENTAL STATUS:20030}    IV Access:  {[facility] IV TPQRIL:265777742}    Nursing Mobility/ADLs:  Walking   {CHP DME VAPI:811300129}  Transfer  {CHP DME IUVW:091270216}  Bathing  {CHP DME EAYH:768407048}  Dressing  {CHP DME EPPP:839269078}  Toileting  {CHP DME DVMA:817879002}  Feeding  {CHP DME LXGS:054965248}  Med Admin  {CHP DME SORL:260046666}  Med Delivery   {[facility] MED Delivery:123242754}    Wound Care Documentation and Therapy:  Incision 10/12/16 Hip Right (Active)   Number of days: 1484       Incision 16 Hip Right (Active)   Number of days: 1344        Elimination:  Continence:   · Bowel: {YES / QF:36314}  · Bladder: {YES / VV:35032}  Urinary Catheter: {Urinary Catheter:753944015}   Colostomy/Ileostomy/Ileal Conduit: {YES / MA:98590}       Date of Last BM: ***    Intake/Output Summary (Last 24 hours) at 2020 1414  Last data filed at 2020 0859  Gross per 24 hour   Intake 582 ml   Output 800 ml   Net -218 ml     I/O last 3 completed shifts: In: 1832 [P.O.:250;  I.V.:1582]  Out: 800 [Urine:500; Blood:300]    Safety Concerns:     [address] [employer] Safety Concerns:673840858}    Impairments/Disabilities:      [employer] Impairments/Disabilities:025387974}    Nutrition Therapy:  Current Nutrition Therapy:   [employer] Diet List:130163745}    Routes of Feeding: {CHP DME Other Feedings:335864171}  Liquids: {Slp liquid thickness:55130}  Daily Fluid Restriction: {CHP DME Yes amt example:574719942}  Last Modified Barium Swallow with Video (Video Swallowing Test): {Done Not Done NHCB:640980749}    Treatments at the Time of Hospital Discharge:   Respiratory Treatments: ***  Oxygen Therapy:  {Therapy; copd oxygen:79990}  Ventilator:    { CC Vent VFCB:981645040}    Rehab Therapies: {THERAPEUTIC INTERVENTION:1110876988}  Weight Bearing Status/Restrictions: [address] Weight Bearin}  Other Medical Equipment (for information only, NOT a DME order):  {EQUIPMENT:764020583}  Other Treatments: ***    Patient's personal belongings (please select all that are sent with

## 2020-11-04 NOTE — DISCHARGE SUMMARY
[facility]    [address] [facility] [facility] [facility]           DISCHARGE SUMMARY    Patient: Gloria Mooney         Reg#: [id number]     YOB: 1960    Date of  Admission & Surgery: 11/3/2020 Date of Discharge: 11/4/2020    Attending Surgeon: Windy Burgess M.D.  PCP: Bruce Cabrera MD        History and Hospital Course    The patient has had severe pain and arthritis of the hip, unresponsive to conservative treatment and is now admitted for joint replacement. Medical evaluation and postoperative co-management is provided by the consultant medical staff. The patient underwent hip replacement on the day of admission, 11/3/2020. The operation was well tolerated by the patient, without any complication. Postoperatively the patient did well, was never grossly febrile, and was able to participate actively in a supervised program of physical therapy for gait training, transfers and joint range of motion. Postoperative hemoglobin remained stable, not requiring transfusion. Wound healing ensued without difficulty and without sign of infection. The patient experienced no other problem or complication during this admission and was considered a satisfactory candidate for discharge. Disposition    The patient was able to be discharged home postoperatively on 11/4/2020, POD # 1, in good condition, with instructions to maintain activity and participation in therapy with total joint precautions. Prescription was given for Percocet 5/325 one q4h prn for pain, for later home use, and a specific regimen was prescribed for continued thromboembolic prophylaxis with Xarelto 10 mg once daily x one month, after discharge. The patient was also advised to follow up in my office within 2 weeks, as scheduled, for re-evaluation. Final diagnosis: Osteoarthritis of the hip  Procedure performed:  Total hip replacement  Complications: None  Disposition: Home

## 2020-11-04 NOTE — PROGRESS NOTES
ocOccupational Therapy   Occupational Therapy Re- Assessment  Date: 2020   Patient Name: Caterina Wolf  MRN: [id number]     : 1960    Date of Service: 2020    Discharge Recommendations:  Home with assist PRN     RN reports patient is medically stable for therapy treatment this date. Chart reviewed prior to treatment and patient is agreeable for therapy. All lines intact and patient positioned comfortably at end of treatment. All patient needs addressed prior to ending therapy session. Assessment   Performance deficits / Impairments: Decreased functional mobility ; Decreased ADL status; Decreased strength;Decreased safe awareness;Decreased balance;Decreased endurance  Prognosis: Good  OT Education: Precautions;Transfer Training;Energy Conservation;OT Role;Plan of Care  Patient Education: Pt issued handouts on SAGRARIO precautions, fall prevention and energy conservation. Barriers to Learning: None, pt is calmer and did very well. REQUIRES OT FOLLOW UP: Yes  Activity Tolerance  Activity Tolerance: Patient Tolerated treatment well  Safety Devices  Safety Devices in place: Yes  Type of devices: Left in bed;Bed alarm in place;Nurse notified;Call light within reach;Gait belt;Patient at risk for falls           Patient Diagnosis(es): There were no encounter diagnoses. has a past medical history of CAD (coronary artery disease), Depression, Fibromyalgia, Full dentures, GERD (gastroesophageal reflux disease), Gout, History of blood transfusion, History of hematuria, Hyperlipidemia, Hypertension, Obesity, Osteoarthritis, Pain management, and Wears glasses. has a past surgical history that includes Foot surgery (); Hysterectomy; Spine surgery (); Nerve Block (14); Nerve Block (14); Nerve Block (14); shoulder surgery (Right; ); Colonoscopy; other surgical history; Shoulder arthroscopy (Left, 06/13/15); Total hip arthroplasty (Right, 10/12/2016);  Hysterectomy, total abdominal (1997); joint replacement; Cardiac catheterization; and Total hip arthroplasty (Left, 11/3/2020). Restrictions  Restrictions/Precautions  Restrictions/Precautions: Weight Bearing, General Precautions, Fall Risk, Up as Tolerated  Required Braces or Orthoses?: No  Lower Extremity Weight Bearing Restrictions  Left Lower Extremity Weight Bearing: Weight Bearing As Tolerated  Position Activity Restriction  Other position/activity restrictions: no SLR- minimally invasive SAGRARIO LLE    Subjective   General  Chart Reviewed: Yes  Patient assessed for rehabilitation services?: Yes  Response to previous treatment: Patient with no complaints from previous session  Family / Caregiver Present: No    Social/Functional History  Social/Functional History  Lives With: Daughter  Type of Home: House  Home Layout: One level  Home Access: Stairs to enter without rails  Entrance Stairs - Number of Steps: 2  Bathroom Shower/Tub: Tub/Shower unit  Bathroom Toilet: Standard  Bathroom Equipment: Toilet raiser  Home Equipment: Rolling walker, Cane  ADL Assistance: Independent  Homemaking Assistance: Independent(daughter does most of homemaking )  Ambulation Assistance: Independent(occasionally uses RW or cane )  Transfer Assistance: Independent  Active : Yes  Mode of Transportation: Car  Occupation: On disability  Leisure & Hobbies: bingo, games on line  Additional Comments: no recent falls       Objective   Vision: Impaired  Vision Exceptions: Wears glasses at all times  Hearing: Within functional limits    Orientation  Overall Orientation Status: Within Normal Limits  Observation/Palpation  Posture: Good  Observation: pt continuing to be labile at start of session, but able to participate with some reassurance.  Pt continuing to have numbness in L LE, but is able throughout session safely wt bear and use RW  Balance  Sitting Balance: Independent  Standing Balance: Contact guard assistance  Functional Mobility  Functional - Mobility Device: Rolling Walker  Activity: To/from bathroom  Assist Level: Contact guard assistance  Functional Mobility Comments: pt completed functional mob in room with CGA, min cues for tech with walker and inc. time to complete d/t nervousness and fear. No buckling noted, but pt does continue to have dimished sensation to touch on entire L LE. Pt agreeable to sit up in chair. Shower Transfers  Shower - Transfer From: Matt Dumont - Transfer Type: To and From  Shower - Transfer To: Transfer tub bench  Shower - Technique: Ambulating  Shower Transfers: Contact Guard  Shower Transfers Comments: Verbal cues for safety/technique  ADL  Feeding: Setup;Supervision  Grooming: Minimal assistance  UE Bathing: Setup;Supervision  LE Bathing: Setup;Minimal assistance  UE Dressing: Setup; Independent  LE Dressing: Setup; Moderate assistance  Toileting: Maximum assistance  Additional Comments: Pt showered using JOSH HEX 4 surgical soap.   Tone RUE  RUE Tone: Normotonic  Tone LUE  LUE Tone: Normotonic  Coordination  Movements Are Fluid And Coordinated: Yes        Transfers  Stand Step Transfers: Stand by assistance  Sit to stand: Contact guard assistance  Stand to sit: Contact guard assistance  Transfer Comments: Min cues for safety/technique     Cognition  Overall Cognitive Status: WFL  Perception  Overall Perceptual Status: WFL     Sensation  Overall Sensation Status: Impaired(numb LLE/ groin/ \"private area\")        LUE AROM (degrees)  LUE AROM : WFL  RUE AROM (degrees)  RUE AROM : WFL  LUE Strength  Gross LUE Strength: WFL  LUE Strength Comment: L UE 4/5  RUE Strength  Gross RUE Strength: WFL  RUE Strength Comment: R shoulder 4-/5, elbow/4/5                   Plan   Plan  Times per week: 5x/week, 1-2x/day  Current Treatment Recommendations: Strengthening, Balance Training, Functional Mobility Training, Endurance Training, Safety Education & Training, Self-Care / ADL, Patient/Caregiver Education & Training, Equipment Evaluation, Education, & procurement, Positioning, Pain Management         Goals  Short term goals  Time Frame for Short term goals: by discharge, pt will  Short term goal 1: demo S/MI with ADL transfers with good safety  Short term goal 2: demo S/MI with toileting routine with good safety  Short term goal 3: demo I with UB ADLs and min A with LB ADLs with good safety, AE/DME as needed  Short term goal 4: demo and verb good understanding of fall prevention techs, EC/WS techs, SAGRARIO precautions, and possible equip needs for home  Short term goal 5: demo S/MI with functional mob in room with good safety for ADL completion  Patient Goals   Patient goals : to go home       Therapy Time   Individual Concurrent Group Co-treatment   Time In 0900         Time Out 0954         Minutes 47 + additional 40 min in pm              Pt would benefit from skilled home OT services in order to maximize occupational performance, safety, and independence in the home environment. Yusef Estevez OT     Pt seen for additional session in afternoon [id number]. Pt further educated on safety with walker with functional mob and navigating distances as home as well as common household obstacles. Pt demo's good carryover of instruction and demo's good safety awareness. Pt able to verb no SLR as precaution as well. Educated on carlos alberto dressing, infection prevention, and signs of infection. Pt is dressed and ready for d/c to home. Pt needing inc. Reassurance at times and inc time to complete tasks at times during session.

## 2020-12-17 ENCOUNTER — HOSPITAL ENCOUNTER (OUTPATIENT)
Dept: PHYSICAL THERAPY | Facility: CLINIC | Age: 60
Setting detail: THERAPIES SERIES
Discharge: HOME | End: 2020-12-17
Payer: COMMERCIAL

## 2020-12-17 PROCEDURE — 97110 THERAPEUTIC EXERCISES: CPT

## 2020-12-17 PROCEDURE — 97161 PT EVAL LOW COMPLEX 20 MIN: CPT

## 2020-12-17 NOTE — CONSULTS
[] Kathy Howe        Outpatient Physical                Therapy       [address]       Phone: [phone]       Fax: [phone] [x] [facility]       Promotion at [address]       Phone: [phone]       Fax: [phone] [] [facility]      for [facility] Promotion     [address]      Phone: [phone]      Fax:  [phone]     Physical Therapy Lower Extremity Evaluation    Date:  2020  Patient: Zandra Romero  : 1960  MRN: [id number]  Physician: Dr. Dennis Borges   Insurance: [facility] Froylan Yoder after visit)  Medical Diagnosis: Z47.1- s/p left SAGRARIO   Rehab Codes: M25.552, M25.652, R26.2NEC, M62.552  Onset Date: 11/3/2020   Next 's appt: 2021    Subjective:   CC: left hip/leg pain  HPI: (onset date): Pt is a 61y.o. year old female with who is 6 weeks s/p left SAGRARIO with anterior approach by Dr. Adelfo Calderon. Pt reports she was discharged to home post-op day 1 and has not completed any home physical therapy or outpatient physical therapy since discharge. Pt notes she lives in a 1 story home with 1 step to enter. Pt notes she lives with her daughter and her daughter is helping her with bathing, dressing, meal prep, home care, and driving. Pt notes she also utlizes DME such as cane/walker and sock aides. Pt notes prior to surgery she was independent with all ADLs, driving and would intermittently utilize an AD to walk prior to surgery. Pt notes she has an extensive surgical hx including back fusion and R hip replacement and revision. Pt states since her surgery, she has had changes in pain and today is \"not a good day. \" Pt notes she is having sharp/stabbing pains that occur intermittently and she states her surgeons believes it is from her low back. Pt states her pain can range from 4-6/10 but can experience 10+/10 pain when experiencing the sharp pains.  Pt notes the sharp/stabbing pains will be random. Pt states her pain is located in her left buttock, lateral hip, anterior hip, and thigh. Pt states her pain is also described as dull, aching, burning, and numb. Pt reports she has some increased sensitivity along the lateral aspect of the leg. Pt notes she will sometimes have pains in the calf and front of her shin on her left leg. Pt also notes she experiences popping in the hip as well. Pt notes intermittent swelling in the left hip and when it has increased swelling, it \"feels like a ball. \" Pt reports she has an increase in R hip pain with movement and positioning. Pt notes she is havng difficulty sleeping because she cannot find a comfortable position and sometimes sitting/standing make the pain worse. Pt notes her pain is improved iwthrepositioning herself, icing, and medication. Pt reports she continues to take oxycodone about every 4 hours. Pt reports she was taking xarelto for DVT prophylaxis. Pt notes she wears her thigh high compression stockings every once and a while when she is having some left leg pain. Pt states she has not had any recent falls due to pain. Pt reports recreationally she enjoys going to [employer], watching movies, and sitting with her family.      Comorbidities:   [] Obesity [] Dialysis  [x] Other: HTN   [] Asthma/COPD [] Dementia [x] Other: arthritis    [] Stroke [] Sleep apnea [x] Other: fibromyalgia    [] Vascular disease [] Rheumatic disease [] Other:     Tests: [] X-Ray: [] MRI:  [] Other:    Medications: [x] Refer to full medical record [] None [] Other:  Allergies:      [x] Refer to full medical record [] None [] Other:    Function:  Hand Dominance  [x] Right  [] Left  Patient lives with: Live with daughter    In what type of home [x]  One story   [] Two story   [] Split level   Number of stairs to enter 1 steps   With handrail on the []  Right to enter   [] Left to enter   Bathroom has a []  Tub only  [x] Tub/shower combo   [] Walk in shower    []  Grab bars  Shower chair  Toilet riser   Washing machine is on [x]  Main level   [] Second level   [] Basement   Job Status []  Normal duty   [] Light duty   [] Off due to condition    []  Retired   [x] Not employed   [] Disability  [] Other:  []  Return to work:       ADL/IADL Previous level of function Current level of function Who currently assists the patient with task   Bathing  [x] Independent  [] Assist [] Independent  [x] Assist daughter   Dress/grooming [x] Independent  [] Assist [] Independent  [x] Assist daughter   Transfer/mobility [x] Independent  [] Assist [] Independent  [x] Assist cane   Feeding [x] Independent  [] Assist [x] Independent  [] Assist    Toileting [x] Independent  [] Assist [x] Independent  [] Assist    Driving [x] Independent  [] Assist [] Independent  [x] Assist daughter   Housekeeping [x] Independent  [] Assist [] Independent  [x] Assist daughter   Grocery shop/meal prep [x] Independent  [] Assist [] Independent  [x] Assist daughter          Yes  No Comments   Fatigue [x] [] Cannot find sleep= cant find a comfortable position   Fever/chills/sweats [] [x]    Malaise  [] [x]    Mental status change [] [x]    Paresthesias/numbness/weakness [x] []    Unexplained weight changes [] [x]    Lightheaded/dizziness [] [x]    Shortness of breath [] [x]      Objective:       OBSERVATION No Deficit Deficit Not Tested Comments   Posture           Palpation (LLE) [] [x] [] Scar: horizontal  - closed and healing appropriately  - puckering of scar noted  - decreased tissue mobility     Palpation:  - tenderness throughout posterior calf, posterior thigh, and anterior thigh    Good pedal pulse on left   Sensation [] [x] [] Left thigh with greater sensitivity    Edema [x] [] []  no observed edema this date         Neurological [] [] [x]     Gait [] [x] [] Decreased gait speed, asymmetrical stance time on left, asymmetrical step length on left        Function:   TU\" with cane, left trunk lean during stance phase   Bed mobility: Slow to move from supine<=>sit with difficulty moving the LLE  Sit <=>stand: increased use of UEs to stand, valgus collapse when sitting/standing, RLE dominance        ROM  ° A/P STRENGTH     Left Right Left Right   Hip Flex (0-120)  64  80 2+ 5   ABD (0-45)  12p  20p       Knee Flex (0-135) 113 WFL 3- 5   Ext (10-0) full full 2+ 5   Ankle DF (0-20)     4- 5   PF (0-50)     WFL WFL     poor quad and glute set on the left     FUNCTION Normal Difficult Unable Comments   Sitting [] [x] []     Standing [] [x] []     Ambulation [] [x] []     Groom/Dress [] [x] []     Lift/Carry [] [x] []     Stairs [] [x] []     Bending [] [x] []     Squat [] [x] []     Kneel [] [x] []        Outcome Measure:  LEFI: 15/52, 29% function       Assessment: Jayesh Dee s a 61y.o. year old female with who is 6 weeks s/p left SAGRARIO with anterior approach by Dr. Edita Mead. Pt reports she was discharged to home post-op day 1 and has not completed any home physical therapy or outpatient physical therapy since discharge. Pt notes she lives in a 1 story home with 1 step to enter. Pt notes she lives with her daughter and her daughter is helping her with bathing, dressing, meal prep, home care, and driving. Pt notes she also utlizes DME such as cane/walker and sock aides. Pt notes prior to surgery she was independent with all ADLs, driving and would intermittently utilize an AD to walk prior to surgery. Pt notes she has an extensive surgical hx including back fusion and R hip replacement and revision. Pt presents with signs and symptoms consistent with left SAGRARIO. Pt presents with increased left hip pain which resulted in pt's inability to sit, stand, or lay for extended periods of time which impeded objective collection. Pt presents with decreased A/PROM, and decreased LLE resulting in altered gait mechanics and ability to complete transfer efficiently.  Pt presents with diffuse LLE tenderness and will continue to be monitored for DVTs and was educated on the importance of compression stocking utilization. At this time, pt will benefit from skilled therapeutic interventions including therapeutic exercise, gait training, and neuromuscular re-education in order to improve left hip pain, restore motion, and regain strength to return to previous level of mobility and function. Problems:  left hip pain  [x] ? Pain: 4-6/10  [x] ? ROM: decreased left hip A/PROM  [x] ? Strength: generalized decreasae in LLE strength  [x] ? Function:  LEFI: 15/52, 29% function  [x] ? Balance: not tested, decreased WBing status on the LLE  [x] Edema: intermittent  [x] Gait Deviations: TU\" with straight cane          Short Term Goals: MEET IN 6 VISITS Status   Pain: Pt will report less than or equal to 4-5/10 left hip pain with therapeutic strengthening and ROM exercises in order to improve muscle activation to assist with transfers, bed mobility, and ambulation. Ongoing   ROM: Pt will improve left hip  AROM to 70° hip flexion in order to promote an efficient gait pattern and improve ability to ascend a curb height step to enter/exit the house, and assist with ability to step in/out of tub. Ongoing   Gait: Pt will be able to complete the TUG in less than or equal to 20\" with the least restrictive assistive device with increased stance time on the LLE to promote an efficient gait pattern. Ongoing    Strength: Pt will be able to complete supine to sit and sit to supine without UE assist to assist with getting in/out of bed and in/out of the tub. Ongoing   Function: Pt will score greater than or equal to 25/52 on the LEFI in order to demonstrate improved function with ADLs and work related tasks. Ongoing   HEP: Pt will be independent in with HEP. Ongoing   Fall Prevention: Pt will acknowledge fall prevention interventions in order to prevent falls at home, work, or in the community.  MET 2020   Long Term Goal: MEET IN 12 VISITS     Pain: Pt will report less than or equal to 2-3/10 left hip pain when sitting, standing, walking, and completing transfers in order to improve tolerance to walking on even and uneven ground, negotiating stairs, performing repeated sit to stands, and completing ADLs in order to progress to prior level of activity Ongoing   ROM: Pt will improve left hip AROM to 80° and 20 degrees passive abduction  in order to climb stairs, ambulate, get in/out of car/tub and progress to prior level of activity. Ongoing   Strength: Pt will demonstrate grossly 4+/5 LLE strength in order to promote promote dynamic stability and strength to assist with standing, walking, transfers, and ADL completion. Ongoing   Gait: Pt will be able to complete the TUG in less than or equal to 17\" with the least restrictive assistive device with increased stance time on the LLE to promote an efficient gait pattern. Ongoing    Outcome Measure: Pt will score greater than or equal to 35/52 on the LEFI in order to demonstrate improved function with ADLs and work related tasks. Ongoing                                 Patient goals: \"to walk better and hopefully decrease pain\"    Rehab Potential:  [x] Good  [] Fair  [] Poor   Suggested Professional Referral:  [x] No  [] Yes:  Barriers to Goal Achievement[de-identified]  [] No  [x] Yes: PMH  Domestic Concerns:  [x] No  [] Yes:    Pt. Education:  [x] Plans/Goals, Risks/Benefits discussed  [x] Home exercise program  Method of Education: [x] Verbal  [x] Demo  [x] Written  HEP: provided to increase frequency of muscle activation exercises  Scar massage: educated to avoid massage directly over the incision  Fall prevention handout  Comprehension of Education:  [x] Verbalizes understanding. [x] Demonstrates understanding. [x] Needs Review. [] Demonstrates/verbalizes understanding of HEP/Ed previously given.     Treatment Plan:  [x] Therapeutic Exercise  [] Modalities:  [x] Therapeutic Activity  [] Ultrasound  [] Electrical Stimulation  [x] Gait Training   [] Massage [] Lumbar/Cervical Traction  [x] Neuromuscular Re-education [x] Cold/hotpack [] Iontophoresis: 4 mg/mL  [x] Instruction in HEP             Dexamethasone Sodium  [x] Manual Therapy             Phosphate 40-80 mAmin  [] Aquatic Therapy                   [x] Vasocompression/    [] Other:            Game Ready   []  Medication allergies reviewed for use of    Dexamethasone Sodium Phosphate 4mg/ml     with iontophoresis treatments. Pt is not allergic.       Frequency: 2 x/week for 12 visits    Todays Treatment:  Modalities:   Precautions: anterior approach, no SLR  Exercises:  Exercise Reps/ Time Weight/ Level Comments   Quad sets x  Towel roll under knee   glute sets x  Sitting or supine   Seated iso hip ADd x     Scar massage x  Demo of scar massage to the left hip incision   Cane adjustment x  Re-adjusted height of cane to be more appropriate for use    Education  x  Educated pt on the importance of wearing compression stockings daily to prevent DVT   Other:    Specific Instructions for next treatment: SAGRARIO protocol: improve quad, glute, HS activation, ROM, strengthening, gait, balance     Evaluation Complexity:  History (Personal factors, comorbidities) [] 0 [] 1-2 [x] 3+   Exam (limitations, restrictions) [] 1-2 [] 3 [x] 4+   Clinical presentation (progression) [x] Stable [] Evolving  [] Unstable   Decision Making [x] Low [] Moderate [] High    [x] Low Complexity [] Moderate Complexity [] High Complexity         Treatment Charges: Mins Units   Evaluation  [x] Low  [] Mod  [] High ----- 1   []  Modalities     [x]  Ther Exercise 8 1   []  Manual Therapy     []  Ther Activities     []  Aquatics     []  Vasocompression     []  Other       Time in: 100 p    Time Out: 135 p    Electronically signed by: Reese Merino, PT        Physician Signature:________________________________Date:__________________  By signing above or cosigning this note, I have reviewed this plan of care and certify a need for medically necessary rehabilitation services.      *PLEASE SIGN ABOVE AND FAX BACK ALL PAGES*

## 2020-12-17 NOTE — FLOWSHEET NOTE
[facility] Fall Risk Assessment    Patient Name:  Xander Yu  : 1960        Risk Factor Scale  Score   History of Falls [x] Yes  [] No 25  0 25   Secondary Diagnosis [x] Yes- s/p Left SAGRARIO  [] No 15  0 15   Ambulatory Aid [] Furniture  [x] Crutches/cane/walker  [] None/bedrest/wheelchair/nurse 30  15  0 15   IV/Heparin Lock [] Yes  [x] No 20  0 0   Gait/Transferring [] Impaired  [x] Weak  [] Normal/bedrest/immobile 20  10  0 10   Mental Status [] Forgets limitations  [x] Oriented to own ability 15  0 0      Total: 65     Based on the Assessment score: check the appropriate box.     []  No intervention needed   Low =   Score of 0-24    []  Use standard prevention interventions Moderate =  Score of 24-44   [] Give patient handout and discuss fall prevention strategies   [] Establish goal of education for patient/family RE: fall prevention strategies    [x]  Use high risk prevention interventions High = Score of 45 and higher   [x] Give patient handout and discuss fall prevention strategies   [x] Establish goal of education for patient/family Re: fall prevention strategies   [x] Discuss lifeline / other resources    Electronically signed by:   Alessandra Law PT  Date: 2020

## 2020-12-17 NOTE — PLAN OF CARE
[] [facility]) - [facility] &  Therapy  [address]  [phone]  F: [phone] [x] [address]  [place] [address]   [address]  P: [phone]  F: [phone] [] [place]  [address] [facility]  P: [phone]  F: [phone] [] [address]  [facility]   [address]   [place]: [phone]  F: [phone]        Physical Therapy Plan of Care    Date:  2020  Patient: Tank Shipley  : 1960  MRN: [id number]  Physician: Dr. Liban Reyes                                  Insurance: Peter Monte Willam Albarado after visit)  Medical Diagnosis: Z47.1- s/p left SAGRARIO                   Rehab Codes: M25.552, M25.652, R26.2NEC, M62.552  Onset Date: 11/3/2020                      Next 's appt: 2021     Subjective:   CC: left hip/leg pain  HPI: (onset date): Pt is a 61y.o. year old female with who is 6 weeks s/p left SAGRARIO with anterior approach by Dr. Erik King. Pt reports she was discharged to home post-op day 1 and has not completed any home physical therapy or outpatient physical therapy since discharge. Pt notes she lives in a 1 story home with 1 step to enter. Pt notes she lives with her daughter and her daughter is helping her with bathing, dressing, meal prep, home care, and driving. Pt notes she also utlizes DME such as cane/walker and sock aides. Pt notes prior to surgery she was independent with all ADLs, driving and would intermittently utilize an AD to walk prior to surgery. Pt notes she has an extensive surgical hx including back fusion and R hip replacement and revision. Pt states since her surgery, she has had changes in pain and today is \"not a good day. \" Pt notes she is having sharp/stabbing pains that occur intermittently and she states her surgeons believes it is from her low back.  Pt states her pain can range from 4-6/10 but can experience 10+/10 pain when experiencing the sharp pains. Pt notes the sharp/stabbing pains will be random. Pt states her pain is located in her left buttock, lateral hip, anterior hip, and thigh. Pt states her pain is also described as dull, aching, burning, and numb. Pt reports she has some increased sensitivity along the lateral aspect of the leg. Pt notes she will sometimes have pains in the calf and front of her shin on her left leg. Pt also notes she experiences popping in the hip as well. Pt notes intermittent swelling in the left hip and when it has increased swelling, it \"feels like a ball. \" Pt reports she has an increase in R hip pain with movement and positioning. Pt notes she is havng difficulty sleeping because she cannot find a comfortable position and sometimes sitting/standing make the pain worse. Pt notes her pain is improved iwthrepositioning herself, icing, and medication. Pt reports she continues to take oxycodone about every 4 hours. Pt reports she was taking xarelto for DVT prophylaxis. Pt notes she wears her thigh high compression stockings every once and a while when she is having some left leg pain. Pt states she has not had any recent falls due to pain. Pt reports recreationally she enjoys going to [employer], watching movies, and sitting with her family.      Comorbidities:   []? Obesity []? Dialysis  [x]? Other: HTN   []? Asthma/COPD []? Dementia [x]? Other: arthritis    []? Stroke []? Sleep apnea [x]? Other: fibromyalgia    []? Vascular disease []? Rheumatic disease []? Other:         ADL/IADL Previous level of function Current level of function Who currently assists the patient with task   Bathing  [x]? Independent  []? Assist []? Independent  [x]? Assist daughter   Dress/grooming [x]? Independent  []? Assist []? Independent  [x]? Assist daughter   Transfer/mobility [x]? Independent  []? Assist []? Independent  [x]? Assist cane   Feeding [x]? hip pain  [x]? ? Pain: 4-6/10  [x]? ? ROM: decreased left hip A/PROM  [x]? ? Strength: generalized decreasae in LLE strength  [x]? ? Function:  LEFI: 15/52, 29% function  [x]? ? Balance: not tested, decreased WBing status on the LLE  [x]? Edema: intermittent  [x]?  Gait Deviations: TU\" with straight cane           Short Term Goals: MEET IN 6 VISITS Status   Pain: Pt will report less than or equal to 4-5/10 left hip pain with therapeutic strengthening and ROM exercises in order to improve muscle activation to assist with transfers, bed mobility, and ambulation. Ongoing   ROM: Pt will improve left hip  AROM to 70° hip flexion in order to promote an efficient gait pattern and improve ability to ascend a curb height step to enter/exit the house, and assist with ability to step in/out of tub. Ongoing   Gait: Pt will be able to complete the TUG in less than or equal to 20\" with the least restrictive assistive device with increased stance time on the LLE to promote an efficient gait pattern. Ongoing    Strength: Pt will be able to complete supine to sit and sit to supine without UE assist to assist with getting in/out of bed and in/out of the tub. Ongoing   Function: Pt will score greater than or equal to 25/52 on the LEFI in order to demonstrate improved function with ADLs and work related tasks. Ongoing   HEP: Pt will be independent in with HEP. Ongoing   Fall Prevention: Pt will acknowledge fall prevention interventions in order to prevent falls at home, work, or in the community.  MET 2020   Long Term Goal: MEET IN 12 VISITS     Pain: Pt will report less than or equal to 2-3/10 left hip pain when sitting, standing, walking, and completing transfers in order to improve tolerance to walking on even and uneven ground, negotiating stairs, performing repeated sit to stands, and completing ADLs in order to progress to prior level of activity Ongoing   ROM: Pt will improve left hip AROM to 80° and 20 degrees passive abduction  in order to climb stairs, ambulate, get in/out of car/tub and progress to prior level of activity. Ongoing   Strength: Pt will demonstrate grossly 4+/5 LLE strength in order to promote promote dynamic stability and strength to assist with standing, walking, transfers, and ADL completion. Ongoing   Gait: Pt will be able to complete the TUG in less than or equal to 17\" with the least restrictive assistive device with increased stance time on the LLE to promote an efficient gait pattern. Ongoing    Outcome Measure: Pt will score greater than or equal to 35/52 on the LEFI in order to demonstrate improved function with ADLs and work related tasks. Ongoing                                 Patient goals: \"to walk better and hopefully decrease pain\"     Rehab Potential:  [x]? Good  []? Fair  []? Poor    Suggested Professional Referral:  [x]? No  []? Yes:  Barriers to Goal Achievement[de-identified]  []? No  [x]? Yes: PMH  Domestic Concerns:  [x]? No  []? Yes:     Treatment Plan:  [x]? Therapeutic Exercise                     []? Modalities:  [x]? Therapeutic Activity                       []? Ultrasound              []? Electrical Stimulation  [x]? Gait Training                                  []? Massage                []? Lumbar/Cervical Traction  [x]? Neuromuscular Re-education        [x]? Cold/hotpack          []? Iontophoresis: 4 mg/mL  [x]? Instruction in HEP                                                                   Dexamethasone Sodium  [x]? Manual Therapy                                                                      Phosphate 40-80 mAmin  []? Aquatic Therapy                            [x]? Vasocompression/               []? Other:                                                                   Game Ready            []? Medication allergies reviewed for use of               Dexamethasone Sodium Phosphate 4mg/ml                with iontophoresis treatments.               Pt is not allergic.        Frequency: 2 x/week for 12 visits      Electronically signed by: Kenny Lyman PT        Physician Signature:________________________________Date:__________________  By signing above or cosigning this note, I have reviewed this plan of care and certify a need for medically necessary rehabilitation services.      *PLEASE SIGN ABOVE AND FAX BACK ALL PAGES*

## 2020-12-29 ENCOUNTER — TELEPHONE (OUTPATIENT)
Dept: INTERNAL MEDICINE | Age: 60
End: 2020-12-29

## 2021-01-08 ENCOUNTER — HOSPITAL ENCOUNTER (OUTPATIENT)
Dept: PHYSICAL THERAPY | Facility: CLINIC | Age: 61
Setting detail: THERAPIES SERIES
Discharge: HOME | End: 2021-01-08
Payer: COMMERCIAL

## 2021-01-08 PROCEDURE — 97110 THERAPEUTIC EXERCISES: CPT

## 2021-01-08 PROCEDURE — 97016 VASOPNEUMATIC DEVICE THERAPY: CPT

## 2021-01-08 RX ORDER — COLCHICINE 0.6 MG/1
TABLET ORAL
Qty: 30 TABLET | Refills: 1 | Status: SHIPPED | OUTPATIENT
Start: 2021-01-08 | End: 2021-03-24

## 2021-01-08 NOTE — FLOWSHEET NOTE
[] [facility]) - [facility] &  Therapy  [address]  [phone]  F: [phone] [] [address]  [facility]   [address]  P: [phone]  F: [phone] [] [address] &  Therapy  [address] [facility]  P: [phone]  F: [phone] [] [address]  P: [phone]  F: [phone] [] [address]  [facility]   [address]   [place]: [phone]  F: [phone]      Physical Therapy Daily Treatment Note    Date:  2021  Patient Name:  Madalyn Tovar    :  1960  MRN: [id number]  hysician: Dr. Mireya Paul  Insurance: Dottie Raymond Peaks after visit)  Medical Diagnosis: Z47.1- s/p left SAGRARIO                   Rehab Codes: M25.552, M25.652, R26.2NEC, M62.552  Onset Date: 11/3/2020                        Next 's appt: 2021  Visit# / total visits: ; Cancels/No Shows: 0/0    Subjective:    Pain:  [x] Yes  [] No Location: L SAGRARIO Pain Rating: (0-10 scale) 6/10  Pain altered Tx:  [x] No  [] Yes  Action:  Comments: pt arrives reporting overall pain this date.      Objective:  Modalities: vasocompression end session L hip 34 degrees, min pressure  Precautions: SAGRARIO anterior aproach  Exercise Reps/Time Weight/ level Comments   Nustep  10 min  lv 1          SUPINE      Quad set 20x5\"     Hs set 20x5\"     Glute set 20x     Hip abd - horizontal 10x2  With assistance from therapist   Heel slides 10x2     SAQ 10x2     Hip add - hooklying      Hip abd - hooklying            SIDELYING      Hip abd            PRONE      Hip extension    With pillows under pelvis--To neutral if anterior approach         SEATED       Hip add 20x ball    March 10x  Leaning back                     STANDING      Heel toe raises 20x     Mini squats 10x     2 way hip 10x ea  Bilateral  Flexion/abduction   Gastroc stretch 3x30\" slant    TKE      Marches      Step up 15x 4\"    Step down      Step lateral      Lateral side steps                  Scar massage x  Demo of scar massage to the left hip incision   Cane adjustment x  Re-adjusted height of cane to be more appropriate for use    Education x  Educated pt on the importance of wearing compression stockings daily to prevent DVT        Specific Instructions for next treatment: SAGRARIO protocol: improve quad, glute, HS activation, ROM, strengthening, gait, balance     Treatment Charges: Mins Units   []  Modalities     []  Ther Exercise 47 3   []  Manual Therapy     []  Ther Activities     []  Aquatics     [x]  Vasocompression 15 1   []  Other     Total Treatment time 62 4       Assessment: [x] Progressing toward goals. Pt apprehensive to all exercises this date due to fear of dislocation. Pt required encouragement from clinician to complete exercises with good carry over. Completed exercises slow and deliberately. Tactile cueing implemented to ensure proper quad, glute and HS muscle recruitment. Pt with intermittent notes of sharp pain in anterior portion of hip and down into distal quad. Ended with vasocompression for pain control. [] No change. [] Other:  [] Patient would continue to benefit from skilled physical therapy services in order to: left hip pain, restore motion, and regain strength to return to previous level of mobility and function.     Problems:  left hip pain  [x]? ? ? Pain: 4-6/10  [x]? ? ? ROM: decreased left hip A/PROM  [x]? ? ? Strength: generalized decreasae in LLE strength  [x]? ? ? Function:  LEFI: 15/52, 29% function  [x]? ? ? Balance: not tested, decreased WBing status on the LLE  [x]? ? Edema: intermittent  [x]? ? Gait Deviations: TU\" with straight cane           Short Term Goals: MEET IN 6 VISITS Status   Pain: Pt will report less than or equal to 4-5/10 left hip pain with therapeutic strengthening and ROM exercises in order to improve muscle activation to assist with transfers, bed mobility, and ambulation. Ongoing   ROM: Pt will improve left hip  AROM to 70° hip flexion in order to promote an efficient gait pattern and improve ability to ascend a curb height step to enter/exit the house, and assist with ability to step in/out of tub. Ongoing   Gait: Pt will be able to complete the TUG in less than or equal to 20\" with the least restrictive assistive device with increased stance time on the LLE to promote an efficient gait pattern. Ongoing    Strength: Pt will be able to complete supine to sit and sit to supine without UE assist to assist with getting in/out of bed and in/out of the tub.   Ongoing   Function: Pt will score greater than or equal to 25/52 on the LEFI in order to demonstrate improved function with ADLs and work related tasks. Ongoing   HEP: Pt will be independent in with HEP. Ongoing   Fall Prevention: Pt will acknowledge fall prevention interventions in order to prevent falls at home, work, or in the community. MET 12/17/2020   Long Term Goal: MEET IN 12 VISITS     Pain: Pt will report less than or equal to 2-3/10 left hip pain when sitting, standing, walking, and completing transfers in order to improve tolerance to walking on even and uneven ground, negotiating stairs, performing repeated sit to stands, and completing ADLs in order to progress to prior level of activity Ongoing   ROM: Pt will improve left hip AROM to 80° and 20 degrees passive abduction  in order to climb stairs, ambulate, get in/out of car/tub and progress to prior level of activity.  Ongoing   Strength: Pt will demonstrate grossly 4+/5 LLE strength in order to promote promote dynamic stability and strength to assist with standing, walking, transfers, and ADL completion.  Ongoing   Gait: Pt will be able to complete the TUG in less than or equal to 17\" with the least restrictive assistive device with increased stance time on the LLE to promote an efficient gait pattern.  Ongoing    Outcome Measure: Pt will score greater than or equal to 35/52 on the LEFI in order to demonstrate improved function with ADLs and work related tasks. Ongoing                                 Patient goals: \"to walk better and hopefully decrease pain\"      Pt. Education:  [x] Yes  [] No  [x] Reviewed Prior HEP/Ed  Method of Education: [x] Verbal  [] Demo  [] Written  Comprehension of Education:  [x] Verbalizes understanding. [] Demonstrates understanding. [x] Needs review. [] Demonstrates/verbalizes HEP/Ed previously given. Plan: [x] Continue current frequency toward long and short term goals. [x] Specific Instructions for subsequent treatments: progress as able in SAGRARIO protocol.       Time In:1100      Time Out: 1212    Electronically signed by:  John Crowell PTA

## 2021-01-12 ENCOUNTER — HOSPITAL ENCOUNTER (OUTPATIENT)
Dept: PHYSICAL THERAPY | Facility: CLINIC | Age: 61
Setting detail: THERAPIES SERIES
Discharge: HOME | End: 2021-01-12
Payer: COMMERCIAL

## 2021-01-12 NOTE — FLOWSHEET NOTE
[] [address]  [address]    [phone]  F: [phone]   [] [address]  [facility]   [address]  P: [phone]  F: [phone]  [] [address]  [address] [facility]  P: [phone]  F: [phone] [] [address]  P: [phone]  F: [phone]  [] [address]  [facility]   [address]   [place]: [phone]  F: [phone]   [] [address]  [place]: [phone]   F: [phone]     Physical Therapy Cancel/No Show note    Date: 2021  Patient: John Markham  : 1960  MRN: [id number]    Cancels/No Shows to date:     For today's appointment patient:    [x]  Cancelled this weeks appointments.      [] Rescheduled appointment    [] No-show     Reason given by patient:    []  Patient ill    [x]  Conflicting appointment    [] No transportation      [] Conflict with work    [] No reason given    [] Weather related    [] TEMCL-17    [] Other:      Comments:        [x] Next appointment was confirmed  @ 2 pm     Electronically signed by: Flaco Graham PTA

## 2021-01-15 ENCOUNTER — APPOINTMENT (OUTPATIENT)
Dept: PHYSICAL THERAPY | Facility: CLINIC | Age: 61
End: 2021-01-15
Payer: COMMERCIAL

## 2021-01-19 ENCOUNTER — HOSPITAL ENCOUNTER (OUTPATIENT)
Dept: PHYSICAL THERAPY | Facility: CLINIC | Age: 61
Setting detail: THERAPIES SERIES
Discharge: HOME | End: 2021-01-19
Payer: COMMERCIAL

## 2021-01-19 PROCEDURE — 97110 THERAPEUTIC EXERCISES: CPT

## 2021-01-19 PROCEDURE — 97016 VASOPNEUMATIC DEVICE THERAPY: CPT

## 2021-01-19 NOTE — FLOWSHEET NOTE
Heel slides 10x2     SAQ 10x2     Hip add - hooklying 20x ball     Hip abd - hooklying            SIDELYING      Hip abd            PRONE      Hip extension    With pillows under pelvis--To neutral if anterior approach         SEATED       Hip add 20x ball NOT today. March 20x  Leaning back   LAQ 20x     HS curls 10x orange                STANDING      Heel toe raises 20x     Mini squats 10x     2 way hip 10x ea  Bilateral  Flexion/abduction, Not today   Gastroc stretch 3x30\" slant    HS curls 20x      TKE      Marches 20x ea     Step up 15x 4\"    Step down 10x 4\"    Step lateral      Lateral side steps                  Scar massage x  Demo of scar massage to the left hip incision   Cane adjustment x  Re-adjusted height of cane to be more appropriate for use    Education x  Educated pt on the importance of wearing compression stockings daily to prevent DVT        Specific Instructions for next treatment: SAGRARIO protocol: improve quad, glute, HS activation, ROM, strengthening, gait, balance     Treatment Charges: Mins Units   []  Modalities     []  Ther Exercise 45 3   []  Manual Therapy     []  Ther Activities     []  Aquatics     [x]  Vasocompression 15 1   []  Other     Total Treatment time 60 4       Assessment: [x] Progressing toward goals. Continues to be apprehensive to exercises this date. Frequently noting an increase in pain in hip and knee. Pt reports the feeling of hip \"slipping out\" with hip abduction during standing exercises therefore held this date. Pt was able to complete descending stairs this date with out incident however required a rest break due to fatigue. Time spent discussing etiology with patient this date and benefits of strengthening to reduce pain and instability. Ended with vasocompression for pain control. [] No change.      [] Other:  [] Patient would continue to benefit from skilled physical therapy services in order to: left hip pain, restore motion, and regain strength to return to previous level of mobility and function.     Problems:  left hip pain  [x]? ? ? Pain: 4-6/10  [x]? ? ? ROM: decreased left hip A/PROM  [x]? ? ? Strength: generalized decreasae in LLE strength  [x]? ? ? Function:  LEFI: 15/52, 29% function  [x]? ? ? Balance: not tested, decreased WBing status on the LLE  [x]? ? Edema: intermittent  [x]? ? Gait Deviations: TU\" with straight cane           Short Term Goals: MEET IN 6 VISITS Status   Pain: Pt will report less than or equal to 4-5/10 left hip pain with therapeutic strengthening and ROM exercises in order to improve muscle activation to assist with transfers, bed mobility, and ambulation. Ongoing   ROM: Pt will improve left hip  AROM to 70° hip flexion in order to promote an efficient gait pattern and improve ability to ascend a curb height step to enter/exit the house, and assist with ability to step in/out of tub. Ongoing   Gait: Pt will be able to complete the TUG in less than or equal to 20\" with the least restrictive assistive device with increased stance time on the LLE to promote an efficient gait pattern. Ongoing    Strength: Pt will be able to complete supine to sit and sit to supine without UE assist to assist with getting in/out of bed and in/out of the tub.   Ongoing   Function: Pt will score greater than or equal to 25/52 on the LEFI in order to demonstrate improved function with ADLs and work related tasks. Ongoing   HEP: Pt will be independent in with HEP. Ongoing   Fall Prevention: Pt will acknowledge fall prevention interventions in order to prevent falls at home, work, or in the community.  MET 2020   Long Term Goal: MEET IN 12 VISITS     Pain: Pt will report less than or equal to 2-3/10 left hip pain when sitting, standing, walking, and completing transfers in order to improve tolerance to walking on even and uneven ground, negotiating stairs, performing repeated sit to stands, and completing ADLs in order to progress to prior level of activity Ongoing   ROM: Pt will improve left hip AROM to 80° and 20 degrees passive abduction  in order to climb stairs, ambulate, get in/out of car/tub and progress to prior level of activity. Ongoing   Strength: Pt will demonstrate grossly 4+/5 LLE strength in order to promote promote dynamic stability and strength to assist with standing, walking, transfers, and ADL completion.  Ongoing   Gait: Pt will be able to complete the TUG in less than or equal to 17\" with the least restrictive assistive device with increased stance time on the LLE to promote an efficient gait pattern.  Ongoing    Outcome Measure: Pt will score greater than or equal to 35/52 on the LEFI in order to demonstrate improved function with ADLs and work related tasks. Ongoing                                 Patient goals: \"to walk better and hopefully decrease pain\"      Pt. Education:  [x] Yes  [] No  [x] Reviewed Prior HEP/Ed  Method of Education: [x] Verbal  [] Demo  [] Written  Comprehension of Education:  [x] Verbalizes understanding. [] Demonstrates understanding. [x] Needs review. [] Demonstrates/verbalizes HEP/Ed previously given. Plan: [x] Continue current frequency toward long and short term goals. [x] Specific Instructions for subsequent treatments: progress as able in SAGRARIO protocol.       Time In:2:00 pm     Time Out: 3:15 pm     Electronically signed by:  Marisela Castillo PTA

## 2021-01-22 ENCOUNTER — HOSPITAL ENCOUNTER (OUTPATIENT)
Dept: PHYSICAL THERAPY | Facility: CLINIC | Age: 61
Setting detail: THERAPIES SERIES
Discharge: HOME | End: 2021-01-22
Payer: COMMERCIAL

## 2021-01-22 PROCEDURE — 97110 THERAPEUTIC EXERCISES: CPT

## 2021-01-22 PROCEDURE — 97016 VASOPNEUMATIC DEVICE THERAPY: CPT

## 2021-01-22 NOTE — FLOWSHEET NOTE
[] [address]  [address]  [phone]  F: [phone] [x] [address]  [facility]   [address]  P: [phone]  F: [phone] [] [address]  Therapy  [address] [facility]  P: [phone]  F: [phone] [] [address]  P: [phone]  F: [phone] [] [address]  [facility]   [address]   [place]: [phone]  F: [phone]      Physical Therapy Daily Treatment Note    Date:  2021  Patient Name:  Olayinka Prince    :  1960  MRN: [id number]  hysician: Dr. Dino Henning  [facility]  Medical Diagnosis: Z47.1- s/p left SAGRARIO                   Rehab Codes: M25.552, M25.652, R26.2NEC, M62.552  Onset Date: 11/3/2020                        Next 's appt: 2021  Visit# / total visits: ; Cancels/No Shows: 1/0    Subjective:    Pain:  [x] Yes  [] No Location: L SAGRARIO Pain Rating: (0-10 scale) 4-5/10  Pain altered Tx:  [x] No  [] Yes  Action:  Comments: Pt arrives noting she is having trouble sleeping and finding a good position. Pt notes she took a Flexeril and it helped her sleep but makes her feel \" weird \" and limits her function in the morning.      Objective:  Modalities: vasocompression end session L hip 34 degrees, min pressure  Precautions: SAGRARIO anterior aproach  Exercise Reps/Time Weight/ level Comments   Nustep  10 min  lv 1          SUPINE      Quad set 20x5\"     Hs set 20x5\"     Glute set 20x5\"     Hip abd - horizontal 10x2  With assistance from therapist   Heel slides 10x2     SAQ 10x2     Hip add - hooklying 20x ball     Hip abd - hooklying 20x Lime    Bridge 10x2           SIDELYING      Hip abd 10x2  AA Pillow in A/PROM  [x]? ? ? Strength: generalized decreasae in LLE strength  [x]? ? ? Function:  LEFI: 15/52, 29% function  [x]? ? ? Balance: not tested, decreased WBing status on the LLE  [x]? ? Edema: intermittent  [x]? ? Gait Deviations: TU\" with straight cane           Short Term Goals: MEET IN 6 VISITS Status   Pain: Pt will report less than or equal to 4-5/10 left hip pain with therapeutic strengthening and ROM exercises in order to improve muscle activation to assist with transfers, bed mobility, and ambulation. Ongoing   ROM: Pt will improve left hip  AROM to 70° hip flexion in order to promote an efficient gait pattern and improve ability to ascend a curb height step to enter/exit the house, and assist with ability to step in/out of tub. Ongoing   Gait: Pt will be able to complete the TUG in less than or equal to 20\" with the least restrictive assistive device with increased stance time on the LLE to promote an efficient gait pattern. Ongoing    Strength: Pt will be able to complete supine to sit and sit to supine without UE assist to assist with getting in/out of bed and in/out of the tub.   Ongoing   Function: Pt will score greater than or equal to 25/52 on the LEFI in order to demonstrate improved function with ADLs and work related tasks. Ongoing   HEP: Pt will be independent in with HEP. Ongoing   Fall Prevention: Pt will acknowledge fall prevention interventions in order to prevent falls at home, work, or in the community.  MET 2020   Long Term Goal: MEET IN 12 VISITS     Pain: Pt will report less than or equal to 2-3/10 left hip pain when sitting, standing, walking, and completing transfers in order to improve tolerance to walking on even and uneven ground, negotiating stairs, performing repeated sit to stands, and completing ADLs in order to progress to prior level of activity Ongoing   ROM: Pt will improve left hip AROM to 80° and 20 degrees passive abduction  in order to climb stairs, ambulate, get in/out of car/tub and progress to prior level of activity. Ongoing   Strength: Pt will demonstrate grossly 4+/5 LLE strength in order to promote promote dynamic stability and strength to assist with standing, walking, transfers, and ADL completion.  Ongoing   Gait: Pt will be able to complete the TUG in less than or equal to 17\" with the least restrictive assistive device with increased stance time on the LLE to promote an efficient gait pattern.  Ongoing    Outcome Measure: Pt will score greater than or equal to 35/52 on the LEFI in order to demonstrate improved function with ADLs and work related tasks. Ongoing                                 Patient goals: \"to walk better and hopefully decrease pain\"      Pt. Education:  [x] Yes  [] No  [x] Reviewed Prior HEP/Ed  Method of Education: [x] Verbal  [] Demo  [] Written  Comprehension of Education:  [x] Verbalizes understanding. [] Demonstrates understanding. [x] Needs review. [] Demonstrates/verbalizes HEP/Ed previously given. Plan: [x] Continue current frequency toward long and short term goals. [x] Specific Instructions for subsequent treatments: progress as able in SAGRARIO protocol.       Time In: 1:32pm     Time Out: 245 pm     Electronically signed by:  Anai Cummings PTA

## 2021-01-26 ENCOUNTER — HOSPITAL ENCOUNTER (OUTPATIENT)
Dept: PHYSICAL THERAPY | Facility: CLINIC | Age: 61
Setting detail: THERAPIES SERIES
Discharge: HOME | End: 2021-01-26
Payer: COMMERCIAL

## 2021-01-26 NOTE — FLOWSHEET NOTE
[] [facility]) - [facility] &  Therapy  [address]    [phone]  F: [phone]   [x] [address]  [facility] [address]   [address]  P: [phone]  F: [phone]  [] [address]  Therapy  [address]  P: [phone]  F: [phone] [] [address]  P: [phone]  F: [phone]  [] [address]  [address] [facility] [address]   [address]   [place]: [phone]  F: [phone]   [] [facility]  [address] [facility] [address]  [place]: [phone]   F: [phone]     Physical Therapy Cancel/No Show note    Date: 2021  Patient: Suzanne Aguilar  : 1960  MRN: [id number]    Cancels/No Shows to date: 0    For today's appointment patient:    [x]  Cancelled     [] Rescheduled appointment    [] No-show     Reason given by patient:    []  Patient ill    []  Conflicting appointment    [] No transportation      [] Conflict with work    [] No reason given    [x] Weather related    [] COVID-19    [] Other:      Comments:        [x] Next appointment was confirmed     Electronically signed by: Jennifer Aguilar PTA

## 2021-01-28 ENCOUNTER — VIRTUAL VISIT (OUTPATIENT)
Dept: INTERNAL MEDICINE | Age: 61
End: 2021-01-28
Payer: COMMERCIAL

## 2021-01-28 DIAGNOSIS — G89.29 CHRONIC RIGHT SHOULDER PAIN: ICD-10-CM

## 2021-01-28 DIAGNOSIS — F33.1 MODERATE EPISODE OF RECURRENT MAJOR DEPRESSIVE DISORDER (HCC): ICD-10-CM

## 2021-01-28 DIAGNOSIS — M25.511 CHRONIC RIGHT SHOULDER PAIN: ICD-10-CM

## 2021-01-28 DIAGNOSIS — R73.02 IGT (IMPAIRED GLUCOSE TOLERANCE): ICD-10-CM

## 2021-01-28 DIAGNOSIS — I10 ESSENTIAL HYPERTENSION: Primary | ICD-10-CM

## 2021-01-28 PROCEDURE — 99442 PR PHYS/QHP TELEPHONE EVALUATION 11-20 MIN: CPT | Performed by: INTERNAL MEDICINE

## 2021-01-28 RX ORDER — HYDROCODONE BITARTRATE AND ACETAMINOPHEN 5; 325 MG/1; MG/1
1 TABLET ORAL EVERY 6 HOURS PRN
COMMUNITY
End: 2021-01-28 | Stop reason: CLARIF

## 2021-01-28 RX ORDER — HYDROCODONE BITARTRATE AND ACETAMINOPHEN 7.5; 325 MG/1; MG/1
1 TABLET ORAL EVERY 6 HOURS PRN
COMMUNITY
End: 2021-03-16 | Stop reason: ALTCHOICE

## 2021-01-28 RX ORDER — CYCLOBENZAPRINE HCL 10 MG
1 TABLET ORAL DAILY
COMMUNITY
Start: 2021-01-12 | End: 2021-01-28 | Stop reason: SINTOL

## 2021-01-28 RX ORDER — AMLODIPINE BESYLATE 10 MG/1
10 TABLET ORAL DAILY
Qty: 30 TABLET | Refills: 5 | Status: SHIPPED | OUTPATIENT
Start: 2021-01-28 | End: 2021-07-08 | Stop reason: SDUPTHER

## 2021-01-28 SDOH — ECONOMIC STABILITY: INCOME INSECURITY: HOW HARD IS IT FOR YOU TO PAY FOR THE VERY BASICS LIKE FOOD, HOUSING, MEDICAL CARE, AND HEATING?: NOT HARD AT ALL

## 2021-01-28 SDOH — ECONOMIC STABILITY: FOOD INSECURITY: WITHIN THE PAST 12 MONTHS, YOU WORRIED THAT YOUR FOOD WOULD RUN OUT BEFORE YOU GOT MONEY TO BUY MORE.: NEVER TRUE

## 2021-01-28 SDOH — ECONOMIC STABILITY: TRANSPORTATION INSECURITY
IN THE PAST 12 MONTHS, HAS THE LACK OF TRANSPORTATION KEPT YOU FROM MEDICAL APPOINTMENTS OR FROM GETTING MEDICATIONS?: NO

## 2021-01-28 SDOH — ECONOMIC STABILITY: FOOD INSECURITY: WITHIN THE PAST 12 MONTHS, THE FOOD YOU BOUGHT JUST DIDN'T LAST AND YOU DIDN'T HAVE MONEY TO GET MORE.: NEVER TRUE

## 2021-01-28 NOTE — PATIENT INSTRUCTIONS
Return To Clinic 3/16/2021 . After Visit Summary  mailed to patient. It is very important for your care that you keep your appointment. If for some reason you are unable to keep your appointment it is equally important that you call our office at [phone] to cancel your appointment and reschedule. Failure to do so may result in your termination from our practice.      -Xray order given to pt, this test does not need to be scheduled, please take order with you to Main Registration at hospital and have completed before your next appointment.     -Khadijah Abebe

## 2021-01-28 NOTE — PROGRESS NOTES
Wicho Allen is a 61 y.o. female evaluated via telephone on 1/28/2021. Consent:  She and/or health care decision maker is aware that that she may receive a bill for this telephone service, depending on her insurance coverage, and has provided verbal consent to proceed: Yes      Documentation:  I communicated with the patient and/or health care decision maker about chronic health issue. She is overall doing well except for her right shoulder. She has been having some popping and pain on lifting the right shoulder. She had a previous rotator cuff tear repair and also some bursitis work by Dr. Willi Betancur in 2011. She had an arthroscopy on her left shoulder in 2015. She is no longer seeing Dr. Lucia Graves.  She recently had a right total hip arthroplasty done and is following up with physical therapy and pain management. She takes Norco 3 times a day and pain is controlled. She says she is able to raise her arm and lift it above her head but but it is popping and there is some pain. No other concerns. She has not done mammogram in 3 years. She 5 agrees to have it done. She had labs done in September which were reviewed with her. Details of this discussion including any medical advice provided: Patient advised to get x-rays done she is also requesting a C-spine x-ray along with her shoulder. We can refer her to therapy or to orthopedics after that depending on her x-rays. She probably has osteoarthritis and may need physical therapy. She is continuing with pain management for now and is on Norco.  Hypertension was well controlled in the past with Norvasc and she will continue on that. Labs including A1c next visit as she is very close to diabetes. Advised to schedule mammogram.      I affirm this is a Patient Initiated Episode with a Patient who has not had a related appointment within my department in the past 7 days or scheduled within the next 24 hours.     Patient identification was verified at the start of the visit: Yes    Total Time: minutes: 11-20 minutes    Note: not billable if this call serves to triage the patient into an appointment for the relevant concern      Socorro Griggs

## 2021-01-29 ENCOUNTER — HOSPITAL ENCOUNTER (OUTPATIENT)
Dept: PHYSICAL THERAPY | Facility: CLINIC | Age: 61
Setting detail: THERAPIES SERIES
Discharge: HOME | End: 2021-01-29
Payer: COMMERCIAL

## 2021-01-29 PROCEDURE — 97110 THERAPEUTIC EXERCISES: CPT

## 2021-01-29 NOTE — FLOWSHEET NOTE
[] [facility]) - [facility] &  Therapy  [address]  [phone]  F: [phone] [x] [address]  [place] [address]   [address]  P: [phone]  F: [phone] [] [address]  Therapy  [address]  P: [phone]  F: [phone] [] [address]  P: [phone]  F: [phone] [] [address]  [facility]   [address]   [place]: [phone]  F: [phone]      Physical Therapy Daily Treatment Note    Date:  2021  Patient Name:  Isra Adjutant    :  1960  MRN: [id number]  hysician: Dr. Deann Saunders  Insurance: [facility] after visit)  Medical Diagnosis: Z47.1- s/p left SAGRARIO                   Rehab Codes: M25.552, M25.652, R26.2NEC, M62.552  Onset Date: 11/3/2020                        Next 's appt: 21  Visit# / total visits: ; progress note needed at visit 12     Cancels/No Shows: 1/0    Subjective:    Pain:  [x] Yes  [] No Location: L SAGRARIO Pain Rating: (0-10 scale) 5/10  Pain altered Tx:  [x] No  [] Yes  Action:  Comments: Pt states she needs to be out of the clinic by 1 pm today. Pt reports she follows up with Dr. Sera Mullins on the . Pt notes she didn't feel comfortable walking outside with the icy weather earlier this week. Pt notes exercises are going \"okay\" at home but there is a lot of popping going on. Pt reports she slept really well last night (pt \"hit a joint\"). Pt reports she still is not driving yet. Pt also notes she has random pains that shoot across her left thigh and sometimes in her knee. Pt feels like she has something in her thigh. Pt reports she feels like her left leg is shorter than her R leg since surgery.      Objective:  Modalities: vasocompression end session L hip 34 degrees, min pressure- not 21   Precautions: SAGRARIO anterior approach  Bolded completed 21   Exercise Reps/Time Weight/ level Comments   Nustep  10 min  lv 1          SUPINE      Quad set 20x3\"     Hs set 20x5\"     Glute set 20x5\"     Hip abd - horizontal 10x2  With assistance from therapist   Heel slides 10x2     SAQ 10x2     Hip add - hooklying 20x ball     Hip abd - hooklying 30x Lime    Bridge 10x2           SIDELYING      Hip abd 10x AA Pillow in between knees         PRONE      Hip extension    With pillows under pelvis--To neutral if anterior approach         SEATED       Hip add 20x ball NOT today. March 20x ea  Leaning back   LAQ 20x  Feels discomfort in her thigh   HS curls 10x2 Lime                STANDING      Heel toe raises 20x     Mini squats 10x2     2 way hip 10x ea  Bilateral  Flexion/abduction   Gastroc stretch 3x30\" slant    HS curls 20x   Pain over the lateral/back of the leg with HS curls   TKE 20x5\" Lime    Marches 20x ea     Step up 20x 4\"    Step down 10x 4\"    Step lateral      Lateral side steps                  Scar massage x  Demo of scar massage to the left hip incision   Cane adjustment x  Re-adjusted height of cane to be more appropriate for use    Education x  Educated pt on the importance of wearing compression stockings daily to prevent DVT     Re-assessment 21  Leg Length (inferior to ASIS--> inferior medial malleolus)  RLE: 87.5 cm  LL: 86.5 cm     TU\" without cane    Hip Flexion: 72 degrees    LEFI: 26/72, 36.1% max function     Specific Instructions for next treatment: SAGRARIO protocol: improve quad, glute, HS activation, ROM, strengthening, gait, balance       Treatment Charges: Mins Units   []  Modalities     []  Ther Exercise 45 3   []  Manual Therapy     []  Ther Activities     []  Aquatics     []  Vasocompression     []  Other     Total Treatment time 45 3       Assessment: [x] Progressing toward goals.  Pt achieved all short term goals except for her pain goal this date. Pt is able to demonstrate 72 degrees of active hip flexion and completion of supine to sit/sit to supine without use of UEs to lift the LLE. Pt completed the TUG in 16\" without an AD, however, a shuffling gait is noted. Pt with complaints of LLE being shorter than the RLE and leg length measurement was taken. Pt does have a 1 cm difference between the R and left leg and was advised to discuss the possibility of a heel lift with the orthopedic surgeon. Pt was able to complete all left hip strengthening activities with minimal complaints of pain, however, weakness is observed. Pt required less assistance with sidelying hip abduction, however, minimal range continues to be noted. Pt did complain of some posterolateral left thigh pain with standing hamstring curls and reports of something \"shifting\" with OKC hip abduction on the left. Pt was able to demonstrate an appropriate gait pattern without antalgia following the reports of \"shifting. \" pt does present with a significnatly tight ITB and attempted to foam roll this date, however, pt is hypersensitive in this area. Pt was educated on desensitization with different textured items to assist with decreased skin sensitivity to allow for manual work over the lateral thigh. Pt verbalizes understanding. [] No change. [] Other:  [x] Patient would continue to benefit from skilled physical therapy services in order to: left hip pain, restore motion, and regain strength to return to previous level of mobility and function.     Problems:  left hip pain  [x]? ? ? Pain: 4-6/10  [x]? ? ? ROM: decreased left hip A/PROM  [x]? ? ? Strength: generalized decreasae in LLE strength  [x]? ? ? Function:  LEFI: 15/52, 29% function  [x]? ? ? Balance: not tested, decreased WBing status on the LLE  [x]? ? Edema: intermittent  [x]? ? Gait Deviations: TU\" with straight cane           Short Term Goals: MEET IN 6 VISITS Status   Pain: Pt will report less than or equal to 4-5/10 left hip pain with therapeutic strengthening and ROM exercises in order to improve muscle activation to assist with transfers, bed mobility, and ambulation. Intermittently met 1/29   ROM: Pt will improve left hip  AROM to 70° hip flexion in order to promote an efficient gait pattern and improve ability to ascend a curb height step to enter/exit the house, and assist with ability to step in/out of tub. MET 1/29  72°   Gait: Pt will be able to complete the TUG in less than or equal to 20\" with the least restrictive assistive device with increased stance time on the LLE to promote an efficient gait pattern. MET 1/29   Strength: Pt will be able to complete supine to sit and sit to supine without UE assist to assist with getting in/out of bed and in/out of the tub.   MET 1/29   Function: Pt will score greater than or equal to 25/52 on the LEFI in order to demonstrate improved function with ADLs and work related tasks. MET 1/29   HEP: Pt will be independent in with HEP. MET 1/29   Fall Prevention: Pt will acknowledge fall prevention interventions in order to prevent falls at home, work, or in the community. MET 12/17/2020   Long Term Goal: MEET IN 12 VISITS     Pain: Pt will report less than or equal to 2-3/10 left hip pain when sitting, standing, walking, and completing transfers in order to improve tolerance to walking on even and uneven ground, negotiating stairs, performing repeated sit to stands, and completing ADLs in order to progress to prior level of activity Ongoing   ROM: Pt will improve left hip AROM to 80° and 20 degrees passive abduction  in order to climb stairs, ambulate, get in/out of car/tub and progress to prior level of activity.  Ongoing   Strength: Pt will demonstrate grossly 4+/5 LLE strength in order to promote promote dynamic stability and strength to assist with standing, walking, transfers, and ADL completion.  Ongoing   Gait: Pt will be able to complete the TUG in less than or equal to 17\" with the least restrictive assistive device with increased stance time on the LLE to promote an efficient gait pattern.  Ongoing    Outcome Measure: Pt will score greater than or equal to 35/52 on the LEFI in order to demonstrate improved function with ADLs and work related tasks. Ongoing                                 Patient goals: \"to walk better and hopefully decrease pain\"      Pt. Education:  [x] Yes  [] No  [x] Reviewed Prior HEP/Ed  Method of Education: [x] Verbal  [] Demo  [] Written  1/29: lateral thigh desensitization   Comprehension of Education:  [x] Verbalizes understanding. [] Demonstrates understanding. [x] Needs review. [] Demonstrates/verbalizes HEP/Ed previously given. Plan: [x] Continue current frequency toward long and short term goals. [x] Specific Instructions for subsequent treatments: progress as able in SAGRARIO protocol.       Time In: 1159 am     Time Out: 1256 pm     Electronically signed by:  Jose Luis Ahmadi PT
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
## 2021-02-01 ENCOUNTER — TELEPHONE (OUTPATIENT)
Dept: INTERNAL MEDICINE | Age: 61
End: 2021-02-01

## 2021-02-01 DIAGNOSIS — Z12.31 BREAST CANCER SCREENING BY MAMMOGRAM: Primary | ICD-10-CM

## 2021-02-05 ENCOUNTER — HOSPITAL ENCOUNTER (OUTPATIENT)
Dept: PHYSICAL THERAPY | Facility: CLINIC | Age: 61
Setting detail: THERAPIES SERIES
Discharge: HOME | End: 2021-02-05
Payer: COMMERCIAL
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
## 2021-02-09 ENCOUNTER — HOSPITAL ENCOUNTER (OUTPATIENT)
Dept: PHYSICAL THERAPY | Facility: CLINIC | Age: 61
Setting detail: THERAPIES SERIES
Discharge: HOME | End: 2021-02-09
Payer: COMMERCIAL

## 2021-02-09 PROCEDURE — 97110 THERAPEUTIC EXERCISES: CPT

## 2021-02-09 NOTE — FLOWSHEET NOTE
[] [facility]) - [facility] &  Therapy  [address]  [phone]  F: [phone] [x] [address]  [facility]   [address]  P: [phone]  F: [phone] [] [address] &  Therapy  [address] [facility]  P: [phone]  F: [phone] [] [address]  P: [phone]  F: [phone] [] [address]  [facility]   [address]   [place]: [phone]  F: [phone]      Physical Therapy Daily Treatment Note    Date:  2021  Patient Name:  Leo Peraza    :  1960  MRN: [id number]  hysician: Dr. Silvino Knight  Insurance: [facility] Crain after visit)  Medical Diagnosis: Z47.1- s/p left SAGRARIO                   Rehab Codes: M25.552, M25.652, R26.2NEC, M62.552  Onset Date: 11/3/2020                        Next 's appt: TBD  Visit# / total visits: ; progress note needed at visit 12 (until 21)    Cancels/No Shows: 1/0    Subjective:    Pain:  [x] Yes  [] No Location: L SAGRARIO Pain Rating: (0-10 scale) 5-6/10  Pain altered Tx:  [x] No  [] Yes  Action:  Comments: Pt reports she did not follow up with her surgeon because he wants her to finish therapy prior to returning. Pt reports she just feels like she is moving slow today. Pt reports when she kicks her left leg out to the side, it doesn't feel like it is there. Pt reports that \"missing feeling\" is how it felt when the R one dislocated.      Objective:  Modalities: vasocompression end session L hip 34 degrees, min pressure- not 21   Precautions: SAGRARIO anterior approach  Bolded completed 21   Exercise Reps/Time Weight/ level Comments   Nustep  6' lv 1          SUPINE      Quad set 20x3\"     Hs set 20x5\"     Glute set 20x5\"     Hip abd - horizontal 10x2  With assistance from therapist   Heel slides 10x2     SAQ 10x2     Posterior pelvic tilt 10x2  Unable to complete the second set fully without head/neck flexion   Hip add - hooklying 20x ball     Hip abd - hooklying 30x Lime    Bridge 10x2           SIDELYING      Hip abd 10x AA Pillow in between knees         PRONE      Quad/hip flexor stretch 3x30\" manual With pillows under pelvis--To neutral if anterior approach         SEATED       Sit to stands 10x  Elevated mat table   March 20x ea  Leaning back   LAQ 20x     HS curls 10x2 Lime                STANDING      Calf stretch 2x30\"     Heel toe raises 20x     Mini squats 10x2     2 way hip 10x ea  Bilateral  Flexion/abduction   HS curls 20x   Pain over the lateral/back of the leg with HS curls   TKE 20x5\" Lime    Marches 20x ea     QL lifts 10x     Step up 20x 4\"    Step down 10x 4\"    Step lateral 10x 4\"    Side steps 5 passes ea  In // bars  With use of mirror for LLE awareness     Re-assessment 21  Leg Length (inferior to ASIS--> inferior medial malleolus)  RLE: 87.5 cm  LL: 86.5 cm     TU\" without cane    Hip Flexion: 72 degrees    LEFI: 26/72, 36.1% max function     Specific Instructions for next treatment: SAGRARIO protocol: improve quad, glute, HS activation, ROM, strengthening, gait, balance       Treatment Charges: Mins Units   []  Modalities     [x]  Ther Exercise 43 3   []  Manual Therapy     []  Ther Activities     []  Aquatics     []  Vasocompression     []  Other     Total Treatment time 43 3       Assessment: [x] Progressing toward goals. Pt with one incident of quad muscle spasm on the left when completing posterior pelvic tilts. Unable to feel a stretch with supine hip flexor stretch, however, felt a stretch in prone. Fair tolerance to prone hip flexor stretch with minimal relaxation noted.  Pt also demonstrate increased difficulty with completion of posterior pelvic tilt without neck flexion during the secondary set due to muscle fatiguing. Pt with consistent complaints of decreased proprioception of her LLE during all standing hip abduction and supine hip abduction activities this date. In order to improve sensory awareness, use of a mirror during side steps was implemented for visual feedback. Pt was able to complete all exercises throughout the session with minimal rest breaks. Time spent educating pt on progressions during her rehab along with continuing to be aware of joint placement. Pt has a hx of a R hip dislocation, therefore, fear of dislocating the left hip continues to be a current barrier in pt's rehab. Reassurance throughout the session is provided to improve confidence with movements and strengthening activities. Pt deferred the use of vasocompression following treatment this date and denies an increase in left hip pain at the end of the session. [] No change. [] Other:  [x] Patient would continue to benefit from skilled physical therapy services in order to: left hip pain, restore motion, and regain strength to return to previous level of mobility and function.     Problems:  left hip pain  [x]? ? ? Pain: 4-6/10  [x]? ? ? ROM: decreased left hip A/PROM  [x]? ? ? Strength: generalized decreasae in LLE strength  [x]? ? ? Function:  LEFI: 15/52, 29% function  [x]? ? ? Balance: not tested, decreased WBing status on the LLE  [x]? ? Edema: intermittent  [x]? ? Gait Deviations: TU\" with straight cane           Short Term Goals: MEET IN 6 VISITS Status   Pain: Pt will report less than or equal to 4-5/10 left hip pain with therapeutic strengthening and ROM exercises in order to improve muscle activation to assist with transfers, bed mobility, and ambulation. Intermittently met    ROM: Pt will improve left hip  AROM to 70° hip flexion in order to promote an efficient gait pattern and improve ability to ascend a curb height step to enter/exit the house, and assist with ability to step in/out of tub.  MET   72°   Gait: Prior HEP/Ed  Method of Education: [x] Verbal  [] Demo  [] Written  1/29: lateral thigh desensitization   Comprehension of Education:  [x] Verbalizes understanding. [] Demonstrates understanding. [x] Needs review. [] Demonstrates/verbalizes HEP/Ed previously given. Plan: [x] Continue current frequency toward long and short term goals. [x] Specific Instructions for subsequent treatments: progress as able in SAGRARIO protocol.       Time In: 207p     Time Out: 300 pm     Electronically signed by:  Fidel Zazueta PT

## 2021-02-12 ENCOUNTER — HOSPITAL ENCOUNTER (OUTPATIENT)
Dept: PHYSICAL THERAPY | Facility: CLINIC | Age: 61
Setting detail: THERAPIES SERIES
Discharge: HOME | End: 2021-02-12
Payer: COMMERCIAL

## 2021-02-12 PROCEDURE — 97110 THERAPEUTIC EXERCISES: CPT

## 2021-02-12 NOTE — FLOWSHEET NOTE
[] [facility]) [facility] &  Therapy  [address]  [phone]  F: [phone] [x] [address]  [place] [address]   [address]  P: [phone]  F: [phone] [] [address] &  Therapy  [address]  P: [phone]  F: [phone] [] [address]  P: [phone]  F: [phone] [] [address]  [facility]   [address]   [place]: [phone]  F: [phone]      Physical Therapy Daily Treatment Note    Date:  2021  Patient Name:  Marcelino Powell    :  1960  MRN: [id number]  hysician: Dr. Hazel Castillo  Insurance: [facility] after visit)  Medical Diagnosis: Z47.1- s/p left SAGRARIO                   Rehab Codes: M25.552, M25.652, R26.2NEC, M62.552  Onset Date: 11/3/2020                        Next 's appt: TBD  Visit# / total visits: ; progress note needed at visit 12 (until 21)    Cancels/No Shows: 1/0    Subjective:    Pain:  [x] Yes  [] No Location: L SAGRARIO Pain Rating: (0-10 scale) 5-6/10  Pain altered Tx:  [x] No  [] Yes  Action:  Comments: Pt arrives denying changes this date.      Objective:  Modalities: vasocompression end session L hip 34 degrees, min pressure- not 21   Precautions: SAGRARIO anterior approach  Bolded completed 21   Exercise Reps/Time Weight/ level Comments   Nustep  10' lv 1 Time increased          SUPINE      Quad set 20x3\"     Hs set 20x5\"     Glute set 20x5\"     Hip abd - horizontal 10x2  With assistance from therapist, muscle guarding noted, limited ROM   Heel slides 10x2     SAQ 10x2     Posterior pelvic tilt 10x2  Unable to complete the fully without head/neck flexion, max VC for posture   Hip add - hooklying 20x ball     Hip abd - hooklying 30x Lime    Bridge 10x2           SIDELYING      Hip abd 10x AA Pillow in between knees         PRONE      Quad/hip flexor stretch 3x30\" manual To neutral per anterior approach, noted guarding and stretch pain         SEATED       Sit to stands 10x  3 reps to pt tolerance with out UE support, 7 with BUE   March 20x ea  Leaning back per protocol   LAQ 30x  Reps increased 2/12   HS curls 10x2 Lime                STANDING      Calf stretch 2x30\"     Heel toe raises 20x  Noted hip flexion/forward bending with toe raises, VC and tactile to correct   Mini squats 10x2     2 way hip 10x ea  Bilateral  Flexion/abduction   HS curls 20x   VC for speed and control   TKE 20x5\" Lime    Marches 20x ea     QL lifts 10x     Step up 20x 4\"    Step down 10x 4\"    Step lateral 10x 4\"    Side steps 5 passes ea  In // bars       Re-assessment 21  Leg Length (inferior to ASIS--> inferior medial malleolus)  RLE: 87.5 cm  LL: 86.5 cm     TU\" without cane    Hip Flexion: 72 degrees    LEFI: 26/72, 36.1% max function     Specific Instructions for next treatment: SAGRARIO protocol: improve quad, glute, HS activation, ROM, strengthening, gait, balance       Treatment Charges: Mins Units   []  Modalities     [x]  Ther Exercise 50 3   []  Manual Therapy     []  Ther Activities     []  Aquatics     []  Vasocompression     []  Other     Total Treatment time 50 3       Assessment: [x] Progressing toward goals. Pt agreeable and tolerant to treatment today. Noted muscle guarding to horiz abd and exercise was conducted PROM initially. Difficulty with pelvic tilts due to confusion and lack of isolated contraction therefor utilized tactile cueing to improve execution. Pt reports compliant with HEP 1x day in the evening. Stretching in AM. Guarding to prone quad stretch. Dull pain throughout treatment however pt able to perform routines as charted above. Pt reports a feeling of \"missing' in all hip adb.  Declined vaso post treatment, notes she is taking ibuprofen 1 hr prior to session. [] No change. [] Other:  [x] Patient would continue to benefit from skilled physical therapy services in order to: left hip pain, restore motion, and regain strength to return to previous level of mobility and function.     Problems:  left hip pain  [x]? ? ? Pain: 4-6/10  [x]? ? ? ROM: decreased left hip A/PROM  [x]? ? ? Strength: generalized decreasae in LLE strength  [x]? ? ? Function:  LEFI: 15, 29% function  [x]? ? ? Balance: not tested, decreased WBing status on the LLE  [x]? ? Edema: intermittent  [x]? ? Gait Deviations: TU\" with straight cane           Short Term Goals: MEET IN 6 VISITS Status   Pain: Pt will report less than or equal to 4-5/10 left hip pain with therapeutic strengthening and ROM exercises in order to improve muscle activation to assist with transfers, bed mobility, and ambulation. Intermittently met    ROM: Pt will improve left hip  AROM to 70° hip flexion in order to promote an efficient gait pattern and improve ability to ascend a curb height step to enter/exit the house, and assist with ability to step in/out of tub. MET   72°   Gait: Pt will be able to complete the TUG in less than or equal to 20\" with the least restrictive assistive device with increased stance time on the LLE to promote an efficient gait pattern. MET    Strength: Pt will be able to complete supine to sit and sit to supine without UE assist to assist with getting in/out of bed and in/out of the tub.   MET    Function: Pt will score greater than or equal to 25/52 on the LEFI in order to demonstrate improved function with ADLs and work related tasks. MET    HEP: Pt will be independent in with HEP. MET    Fall Prevention: Pt will acknowledge fall prevention interventions in order to prevent falls at home, work, or in the community.  MET 2020   Long Term Goal: MEET IN 12 VISITS     Pain: Pt will report less than or equal to 2-3/10 left hip pain when sitting, standing, walking, and completing transfers in order to improve tolerance to walking on even and uneven ground, negotiating stairs, performing repeated sit to stands, and completing ADLs in order to progress to prior level of activity Ongoing   ROM: Pt will improve left hip AROM to 80° and 20 degrees passive abduction  in order to climb stairs, ambulate, get in/out of car/tub and progress to prior level of activity. Ongoing   Strength: Pt will demonstrate grossly 4+/5 LLE strength in order to promote promote dynamic stability and strength to assist with standing, walking, transfers, and ADL completion.  Ongoing   Gait: Pt will be able to complete the TUG in less than or equal to 17\" with the least restrictive assistive device with increased stance time on the LLE to promote an efficient gait pattern.  Ongoing    Outcome Measure: Pt will score greater than or equal to 35/52 on the LEFI in order to demonstrate improved function with ADLs and work related tasks. Ongoing                                 Patient goals: \"to walk better and hopefully decrease pain\"      Pt. Education:  [x] Yes  [] No  [x] Reviewed Prior HEP/Ed  Method of Education: [x] Verbal  [x] Demo mat routine and quad sets  [] Written  1/29: lateral thigh desensitization   Comprehension of Education:  [x] Verbalizes understanding. [x] Demonstrates understanding. [x] Needs review. [x] Demonstrates/verbalizes HEP/Ed previously given. Plan: [x] Continue current frequency toward long and short term goals. [x] Specific Instructions for subsequent treatments: progress as able in SAGRARIO protocol. Time In: 2:00p     Time Out: 300 pm     Electronically signed by:  ELAYNE Collins   Treatment directly supervised and documentation approved by Alba Bello PTA.

## 2021-02-16 ENCOUNTER — APPOINTMENT (OUTPATIENT)
Dept: PHYSICAL THERAPY | Facility: CLINIC | Age: 61
End: 2021-02-16
Payer: COMMERCIAL

## 2021-02-19 ENCOUNTER — HOSPITAL ENCOUNTER (OUTPATIENT)
Dept: PHYSICAL THERAPY | Facility: CLINIC | Age: 61
Setting detail: THERAPIES SERIES
Discharge: HOME | End: 2021-02-19
Payer: COMMERCIAL

## 2021-02-22 ENCOUNTER — HOSPITAL ENCOUNTER (OUTPATIENT)
Age: 61
Discharge: HOME | End: 2021-02-24
Payer: COMMERCIAL

## 2021-02-22 ENCOUNTER — HOSPITAL ENCOUNTER (OUTPATIENT)
Dept: GENERAL RADIOLOGY | Age: 61
Discharge: HOME | End: 2021-02-24
Payer: COMMERCIAL

## 2021-02-22 DIAGNOSIS — M54.12 CERVICAL RADICULAR PAIN: ICD-10-CM

## 2021-02-22 DIAGNOSIS — M25.511 CHRONIC RIGHT SHOULDER PAIN: ICD-10-CM

## 2021-02-22 DIAGNOSIS — G89.29 CHRONIC RIGHT SHOULDER PAIN: ICD-10-CM

## 2021-02-22 DIAGNOSIS — M54.16 ACUTE LUMBAR RADICULOPATHY: ICD-10-CM

## 2021-02-22 PROCEDURE — 72040 X-RAY EXAM NECK SPINE 2-3 VW: CPT

## 2021-02-22 PROCEDURE — 73030 X-RAY EXAM OF SHOULDER: CPT

## 2021-02-22 PROCEDURE — 72120 X-RAY BEND ONLY L-S SPINE: CPT

## 2021-02-25 ENCOUNTER — TELEPHONE (OUTPATIENT)
Dept: INTERNAL MEDICINE | Age: 61
End: 2021-02-25

## 2021-02-25 NOTE — TELEPHONE ENCOUNTER
Patient is requesting the results of      X-Ray    This was done on 2/22/21  Test were preformed at [address] [place] [address]: Pt had other xrays done at the same time for another doctor. Pt would like to go back to her old neurologist if possible. Dr George Best? At [facility].

## 2021-02-26 ENCOUNTER — HOSPITAL ENCOUNTER (OUTPATIENT)
Dept: PHYSICAL THERAPY | Facility: CLINIC | Age: 61
Setting detail: THERAPIES SERIES
Discharge: HOME | End: 2021-02-26
Payer: COMMERCIAL

## 2021-02-26 PROCEDURE — 97110 THERAPEUTIC EXERCISES: CPT

## 2021-02-26 NOTE — FLOWSHEET NOTE
[] [facility]) - [facility] &  Therapy  [address]  [phone]  F: [phone] [x] [address]  [facility] [address]   [address]  P: [phone]  F: [phone] [] [address] &  Therapy  [address]  P: [phone]  F: [phone] [] [address]  P: [phone]  F: [phone] [] [address]  [facility]   [address]   [place]: [phone]  F: [phone]      Physical Therapy Daily Treatment Note    Date:  2021  Patient Name:  Reina Shay    :  1960  MRN: [id number]  hysician: Dr. Lauren Cifuentes  Insurance: [facility] Alyssa Killian after visit)  Medical Diagnosis: Z47.1- s/p left SAGRARIO                   Rehab Codes: M25.552, M25.652, R26.2NEC, M62.552  Onset Date: 11/3/2020                        Next 's appt: 3/23/21  Visit# / total visits: ; progress note needed at visit 12 (until 21)    Cancels/No Shows: 3/    Subjective:    Pain:  [x] Yes  [] No Location: L SAGRARIO Pain Rating: (0-10 scale) 5/10  Pain altered Tx:  [x] No  [] Yes  Action:  Comments: Pt arrives amb with straight cane and 18 min late to therapy this date but able to accommodate. No new changes noted with L hip, however, mentions herniated disc in neck. Reports taking new medication starting on Tuesday noting it makes her tired and her body is still adjusting to the change.      Objective:  Modalities: vasocompression end session L hip 34 degrees, min pressure- not 21   Precautions: SAGRARIO anterior approach  Bolded completed 21   Exercise Reps/Time Weight/ level Comments   Nustep  10' lv 1 Time increased          SUPINE      Quad set 20x3\"     Hs set 20x5\"     Glute set 20x5\"     Hip abd - horizontal 10x2  With assistance from therapist, muscle guarding noted, limited ROM   Heel slides 10x2     SAQ 10x2     Posterior pelvic tilt 10x2  Unable to complete the fully without head/neck flexion, max VC for posture   Hip add - hooklying 20x ball     Hip abd - hooklying 30x Lime    Bridge 10x2           SIDELYING      Hip abd 10x AA Pillow in between knees   Clamshells       PRONE      Quad/hip flexor stretch 3x30\" manual To neutral per anterior approach, noted guarding and stretch pain         SEATED       Sit to stands 10x  3 reps to pt tolerance with out UE support, 7 with BUE   March 20x ea  Leaning back per protocol   LAQ 30x  Reps increased 2/12   HS curls 10x2 Lime    Quad stretch  3x20\" Manual           STANDING      Calf stretch 2x30\"     Heel toe raises 20x  Noted hip flexion/forward bending with toe raises, VC and tactile to correct   Mini squats 10x2     2 way hip 10x ea  Bilateral  Flexion/abduction   HS curls 20x   VC for speed and control   TKE 20x5\" Lime    Marches 20x ea     QL lifts 10x     Step up 20x 4\"    Step down 15x 4\"    Step lateral 15x 4\"    Side steps 5 passes ea  In // bars         Re-assessment 2/26/21- completed by Della Horvath, PT   Leg Length (inferior to ASIS--> inferior medial malleolus)  RLE: 87.5 cm  LL: 86.5 cm     TUG: 15\" without cane-- continues to have a shuffle gait pattern    LEFI: 34/80, 42.5% max function       Left hip ROM  ° A/P Left hip STRENGTH     Eval 2/26 Eval 2/26   Hip Flex (0-120)  64  80 2+ 4+   ABD (0-45)  12p 14 passive   3-   ER    4+   Knee Flex (0-135) 113 WFL 3- 4+   Ext (10-0) full full 2+ 4+        Specific Instructions for next treatment: SAGRARIO protocol: improve quad, glute, HS activation, ROM, strengthening, gait, balance       Treatment Charges: Mins Units   []  Modalities     [x]  Ther Exercise 57 4   []  Manual Therapy     []  Ther Activities     []  Aquatics     []  Vasocompression     []  Other     Total Treatment time 57 4       Assessment: [x] Progressing toward goals. Initiated session with supine exercises. Pt with difficulty completing supine hip abduction on her own, therefore, first set completed with AAROM. Quad stretch completed in seated this date. Able to increase reps for step downs and lateral step ups on 4\" step. Cueing required throughout session to ensure proper positioning and avoid compensatory strategies. Mentions something is 'missing' with standing hip abduction completed with LLE. Extra time spent on goal reassessment this date. Pt encouraged to complete daily HEP to further increase ROM and LE strength. [] No change. [] Other:  [x] Patient would continue to benefit from skilled physical therapy services in order to: left hip pain, restore motion, and regain strength to return to previous level of mobility and function.     Problems:  left hip pain  [x]? ? ? Pain: 4-6/10  [x]? ? ? ROM: decreased left hip A/PROM  [x]? ? ? Strength: generalized decreasae in LLE strength  [x]? ? ? Function:  LEFI: 15/52, 29% function  [x]? ? ? Balance: not tested, decreased WBing status on the LLE  [x]? ? Edema: intermittent  [x]? ? Gait Deviations: TU\" with straight cane           Short Term Goals: MEET IN 6 VISITS Status   Pain: Pt will report less than or equal to 4-5/10 left hip pain with therapeutic strengthening and ROM exercises in order to improve muscle activation to assist with transfers, bed mobility, and ambulation. Intermittently met    ROM: Pt will improve left hip  AROM to 70° hip flexion in order to promote an efficient gait pattern and improve ability to ascend a curb height step to enter/exit the house, and assist with ability to step in/out of tub. MET   72°   Gait: Pt will be able to complete the TUG in less than or equal to 20\" with the least restrictive assistive device with increased stance time on the LLE to promote an efficient gait pattern.   MET    Strength: Pt will be able to complete supine to sit and sit to supine without UE assist to assist with getting in/out of bed and in/out of the tub.   MET 1/29   Function: Pt will score greater than or equal to 25/52 on the LEFI in order to demonstrate improved function with ADLs and work related tasks. MET 1/29   HEP: Pt will be independent in with HEP. MET 1/29   Fall Prevention: Pt will acknowledge fall prevention interventions in order to prevent falls at home, work, or in the community. MET 12/17/2020   Long Term Goal: MEET IN 12 VISITS     Pain: Pt will report less than or equal to 2-3/10 left hip pain when sitting, standing, walking, and completing transfers in order to improve tolerance to walking on even and uneven ground, negotiating stairs, performing repeated sit to stands, and completing ADLs in order to progress to prior level of activity Ongoing 2/26/21   ROM: Pt will improve left hip AROM to 80° and 20 degrees passive abduction  in order to climb stairs, ambulate, get in/out of car/tub and progress to prior level of activity. Ongoing 2/26/21   Strength: Pt will demonstrate grossly 4+/5 LLE strength in order to promote promote dynamic stability and strength to assist with standing, walking, transfers, and ADL completion.  Ongoing 2/26/21   Gait: Pt will be able to complete the TUG in less than or equal to 17\" with the least restrictive assistive device with increased stance time on the LLE to promote an efficient gait pattern.  MET 2/26/21   Outcome Measure: Pt will score greater than or equal to 35/52 on the LEFI in order to demonstrate improved function with ADLs and work related tasks. Ongoing 2/26/21                                 Patient goals: \"to walk better and hopefully decrease pain\"      Pt. Education:  [x] Yes  [] No  [x] Reviewed Prior HEP/Ed  Method of Education: [x] Verbal  [x] Demo   [] Written  1/29: lateral thigh desensitization   Comprehension of Education:  [x] Verbalizes understanding. [x] Demonstrates understanding. [x] Needs review.   [x] Demonstrates/verbalizes HEP/Ed previously given. Plan: [x] Continue current frequency toward long and short term goals. - authorization extension will be requested to finish remaining visits   [x] Specific Instructions for subsequent treatments: progress as able in SAGRARIO protocol.       Time In: 1:18 pm     Time Out: 2:15 pm     Electronically signed by: Divya Sweeney PTA

## 2021-03-01 NOTE — PROGRESS NOTES
pattern     LEFI: 34/80, 42.5% max function      Left hip ROM  ° A/P Left hip STRENGTH     Eval  Eval    Hip Flex (0-120)  64  80 2+ 4+   ABD (0-45)  12p 14 passive   3-   ER       4+   Knee Flex (0-135) 113 WFL 3- 4+   Ext (10-0) full full 2+ 4+          Assessment:  Mode Laura is 4 months s/p left SAGRARIO with anterior approach. Pt had a delayed start to PT following her surgery and due to insurance, once initial evaluation was completed, follow-up visits were not initiated until 2021. Pt was authorized for 12 PT visits, however, her authorization date has ended and an extension is warranted. Pt demonstrates an improvement in LLE strength and ROM since initial evaluation, however, pt continues to have fear of abduction movements. Pt with considerable LLE hip musculature weakness, especially the lateral hip musculature to promote stability to the left hip. Pt also has demonstrated improvements in gait mechanics, however, a shuffle gait continues to be noted which could be due to footwear and also LLD. Pt's left SAGRARIO diagnosis is also complicated by pt's PMH and chronic low back pain. At this time, pt would benefit from the completion of the authorized physical therapy visits in order to improve hip and core strength to assist with confidence with LLE hip movements and overall ADL/mobility completion. []? No change. []? Other:  [x]? Patient would continue to benefit from skilled physical therapy services in order to: left hip pain, restore motion, and regain strength to return to previous level of mobility and function.      Problems:  left hip pain  [x]? ?? ? Pain: 4-6/10  [x]? ?? ? ROM: decreased left hip A/PROM  [x]? ?? ? Strength: generalized decreasae in LLE strength  [x]??? ? Function:  LEFI: 15/52, 29% function  [x]? ?? ? Balance: not tested, decreased WBing status on the LLE  [x]? ?? Edema: intermittent  [x]? ?? Gait Deviations: TU\" with straight cane           Short Term Goals: MEET IN 6 VISITS Status   Pain: Pt will report less than or equal to 4-5/10 left hip pain with therapeutic strengthening and ROM exercises in order to improve muscle activation to assist with transfers, bed mobility, and ambulation. Intermittently met 1/29   ROM: Pt will improve left hip  AROM to 70° hip flexion in order to promote an efficient gait pattern and improve ability to ascend a curb height step to enter/exit the house, and assist with ability to step in/out of tub. MET 1/29  72°   Gait: Pt will be able to complete the TUG in less than or equal to 20\" with the least restrictive assistive device with increased stance time on the LLE to promote an efficient gait pattern. MET 1/29   Strength: Pt will be able to complete supine to sit and sit to supine without UE assist to assist with getting in/out of bed and in/out of the tub.   MET 1/29   Function: Pt will score greater than or equal to 25/52 on the LEFI in order to demonstrate improved function with ADLs and work related tasks. MET 1/29   HEP: Pt will be independent in with HEP. MET 1/29   Fall Prevention: Pt will acknowledge fall prevention interventions in order to prevent falls at home, work, or in the community. MET 12/17/2020   Long Term Goal: MEET IN 12 VISITS     Pain: Pt will report less than or equal to 2-3/10 left hip pain when sitting, standing, walking, and completing transfers in order to improve tolerance to walking on even and uneven ground, negotiating stairs, performing repeated sit to stands, and completing ADLs in order to progress to prior level of activity Ongoing 2/26/21   ROM: Pt will improve left hip AROM to 80° and 20 degrees passive abduction  in order to climb stairs, ambulate, get in/out of car/tub and progress to prior level of activity.  Ongoing 2/26/21   Strength: Pt will demonstrate grossly 4+/5 LLE strength in order to promote promote dynamic stability and strength to assist with standing, walking, transfers, and ADL completion.  Ongoing 2/26/21   Gait: Pt will be able to complete the TUG in less than or equal to 17\" with the least restrictive assistive device with increased stance time on the LLE to promote an efficient gait pattern.  MET 2/26/21   Outcome Measure: Pt will score greater than or equal to 35/52 on the LEFI in order to demonstrate improved function with ADLs and work related tasks. Ongoing 2/26/21                                 Patient goals: \"to walk better and hopefully decrease pain\"      Treatment Plan:  [x] Therapeutic Exercise   29014  [] Iontophoresis: 4 mg/mL Dexamethasone Sodium Phosphate  mAmin  31452   [] Therapeutic Activity  99468 [x] Vasopneumatic cold with compression  40192    [] Gait Training   40138 [] Ultrasound   76763   [] Neuromuscular Re-education  92613 [] Electrical Stimulation Unattended  42085   [] Manual Therapy  26791 [] Electrical Stimulation Attended  66691   [x] Instruction in HEP  [] Lumbar/Cervical Traction  00180   [] Aquatic Therapy   46249 [] Cold/hotpack    [] Massage   01249      [] Dry Needling, 1 or 2 muscles  83385   [] Biofeedback, first 15 minutes   89445  [] Biofeedback, additional 15 minutes   88945 [] Dry Needling, 3 or more muscles  69447       Patient Status:     [x] Continue per initial plan of care. [] Additional visits necessary. [] Other:    Electronically signed by Cori Nguyne PT on 3/1/2021 at 8:18 AM      If you have any questions or concerns, please don't hesitate to call. Thank you for your referral.    Physician Signature:________________________________Date:__________________  By signing above or cosigning this note, I have reviewed this plan of care and certify a need for medically necessary rehabilitation services.      *PLEASE SIGN ABOVE AND FAX BACK ALL PAGES*

## 2021-03-04 NOTE — TELEPHONE ENCOUNTER
Neurologists do not see for herniated disks or do injections in spine. It would be NS for evaluation and usually Pain management does MRI and injections so I am confused. Can I get her notes from Pain management?  Thanks

## 2021-03-16 ENCOUNTER — OFFICE VISIT (OUTPATIENT)
Dept: INTERNAL MEDICINE | Age: 61
End: 2021-03-16
Payer: COMMERCIAL

## 2021-03-16 VITALS
DIASTOLIC BLOOD PRESSURE: 66 MMHG | HEART RATE: 57 BPM | BODY MASS INDEX: 30.51 KG/M2 | WEIGHT: 189 LBS | SYSTOLIC BLOOD PRESSURE: 130 MMHG

## 2021-03-16 DIAGNOSIS — M54.12 CERVICAL RADICULOPATHY: Primary | ICD-10-CM

## 2021-03-16 DIAGNOSIS — E78.00 PURE HYPERCHOLESTEROLEMIA: ICD-10-CM

## 2021-03-16 DIAGNOSIS — F33.1 MODERATE EPISODE OF RECURRENT MAJOR DEPRESSIVE DISORDER (HCC): ICD-10-CM

## 2021-03-16 DIAGNOSIS — R73.02 IGT (IMPAIRED GLUCOSE TOLERANCE): ICD-10-CM

## 2021-03-16 DIAGNOSIS — I10 ESSENTIAL HYPERTENSION: ICD-10-CM

## 2021-03-16 PROCEDURE — G8484 FLU IMMUNIZE NO ADMIN: HCPCS | Performed by: INTERNAL MEDICINE

## 2021-03-16 PROCEDURE — G8427 DOCREV CUR MEDS BY ELIG CLIN: HCPCS | Performed by: INTERNAL MEDICINE

## 2021-03-16 PROCEDURE — 3017F COLORECTAL CA SCREEN DOC REV: CPT | Performed by: INTERNAL MEDICINE

## 2021-03-16 PROCEDURE — 99211 OFF/OP EST MAY X REQ PHY/QHP: CPT | Performed by: INTERNAL MEDICINE

## 2021-03-16 PROCEDURE — G8417 CALC BMI ABV UP PARAM F/U: HCPCS | Performed by: INTERNAL MEDICINE

## 2021-03-16 PROCEDURE — 99214 OFFICE O/P EST MOD 30 MIN: CPT | Performed by: INTERNAL MEDICINE

## 2021-03-16 PROCEDURE — 4004F PT TOBACCO SCREEN RCVD TLK: CPT | Performed by: INTERNAL MEDICINE

## 2021-03-16 RX ORDER — TIZANIDINE 2 MG/1
2 TABLET ORAL 3 TIMES DAILY
COMMUNITY
Start: 2021-02-23 | End: 2021-07-08 | Stop reason: ALTCHOICE

## 2021-03-16 RX ORDER — ALPRAZOLAM 0.25 MG/1
0.25 TABLET ORAL
Qty: 2 TABLET | Refills: 0 | Status: SHIPPED | OUTPATIENT
Start: 2021-03-16 | End: 2021-03-16

## 2021-03-16 RX ORDER — HYDROMORPHONE HYDROCHLORIDE 4 MG/1
4 TABLET ORAL 3 TIMES DAILY
COMMUNITY
Start: 2021-02-23 | End: 2021-07-08 | Stop reason: ALTCHOICE

## 2021-03-16 ASSESSMENT — ENCOUNTER SYMPTOMS
WHEEZING: 0
SHORTNESS OF BREATH: 0
BACK PAIN: 1
COUGH: 0
ABDOMINAL PAIN: 0
CONSTIPATION: 1

## 2021-03-16 NOTE — LETTER
[address] [address]  [address] [facility] [address]  Phone: [phone]  Fax: [phone]    Tasia Hurtado MD        March 16, 2021     Patient: Mode Laura   YOB: 1960   Date of Visit: 3/16/2021       To Whom It May Concern: It is my medical opinion that Lisa Cuenca is unable to perform jury duty at this time. If you have any questions or concerns, please don't hesitate to call.     Sincerely,        Tasia Hurtado MD

## 2021-03-16 NOTE — PROGRESS NOTES
[facility]    Progress Note    Date of patient's visit: 3/16/2021    Patient's Name:  Marcelino Powell    YOB: 1960            Patient Care Team:  Marilu Reese MD as PCP - General (Internal Medicine)  Marilu Reese MD as PCP - REHABILITATION [facility] Empaneled Provider  Arin Anderson MD as Consulting Physician (Rheumatology)  Morenita Peraza MD as Anesthesiologist (Pain Management)  Zen Christopher MD (Orthopedic Surgery)  Minerva Chapa MD as Surgeon (Orthopedic Surgery)  Abelino Arana MD as Anesthesiologist (Pain Management)    REASON FOR VISIT: Routine outpatient follow     Chief Complaint   Patient presents with    Hypertension    Back Pain     pt c/o having back pain and pinched nerve, had xray and would like to discuss results. has MRI scheduled on 3/22          HISTORY OF PRESENT ILLNESS:    History was obtained from the patient. Marcelino Powell is a 61 y.o. is here for complains of pain in the neck area radiating to her right upper back and down into her right arm with numbness of her right hand. She has difficulty holding objects with her right hand as it feels it is going to fall. She has been going to pain management. X-rays show some degenerative changes of her C-spine and right shoulder. She is scheduled to have an MRI next week. She is claustrophobic. She is requesting something to help calm her down. She was told she may need injections into her C-spine but she is refusing. She wants to do therapy. She does not want surgery either on the neck even if there is a herniated disc. She wants to see physical therapy which has helped. She does have a lot of numbness and have advised her to get an EMG as well. She says she will go back to see her old neurologist who had previously done EMG on her lower extremities for lumbar radiculopathy. She had hip surgery done. She finished her physical therapy.   She has a follow-up with her orthopedic physician soon.    She does not like taking muscle relaxants or narcotics as they make her very tired and sleepy. She was recently started on hydromorphone. She used to be on Norco in the past.  She does not want to stay on Dilaudid for too long as she is worried about dependence. But she has been on narcotics at high doses for some time now. Patient has arthritis in several joints. She has been called for jury duty and she feels she cannot do it. She is requesting an excuse. She states she is taking all her medications. Blood pressure is controlled. Cervical xray  Impression   Mild cervical spondylotic changes.       Similar lumbar spondylotic changes with previous L4-L5 fusion.       Mild degenerative changes of the right shoulder. Past Medical History:   Diagnosis Date    CAD (coronary artery disease)     pt denies, CARDIAC CATH O.K., STRESS O.K.    Depression 7/30/2013    Fibromyalgia     Full dentures     GERD (gastroesophageal reflux disease)     Gout     History of blood transfusion 1978    History of hematuria     micro    Hyperlipidemia 7/30/2013    Hypertension     Obesity 7/30/2013    Osteoarthritis     Pain management     sees [facility]    Wears glasses        Past Surgical History:   Procedure Laterality Date    CARDIAC CATHETERIZATION      Several years ago (Written 10/15/2020). No stents placed per pt.      COLONOSCOPY      polyps removed    FOOT SURGERY  1968    HYSTERECTOMY      HYSTERECTOMY, TOTAL ABDOMINAL  1997    JOINT REPLACEMENT      NERVE BLOCK  2/5/14    caudal #1  celestone 9mg    NERVE BLOCK  2/11/14    caudal #2  celestone 9mg    NERVE BLOCK  02/18/14    caudal# 3, celestone 9 mg    OTHER SURGICAL HISTORY      SPINAL ENDOSCOPY X3    SHOULDER ARTHROSCOPY Left 06/13/15    SHOULDER SURGERY  Right; 2011    SPINE SURGERY  2009    TOTAL HIP ARTHROPLASTY Right 10/12/2016    TOTAL HIP ARTHROPLASTY Left 11/3/2020    LEFT HIP TOTAL ARTHROPLASTY Positive for constipation. Negative for abdominal pain. Endocrine: Negative for polydipsia and polyuria. Musculoskeletal: Positive for arthralgias, back pain and neck pain. Neurological: Positive for numbness. Negative for dizziness, syncope, weakness and headaches. Hematological: Negative for adenopathy. Does not bruise/bleed easily. Psychiatric/Behavioral: Positive for dysphoric mood. The patient is nervous/anxious. PHYSICAL EXAM:     Vitals:    03/16/21 1604   BP: 130/66   Site: Left Upper Arm   Position: Sitting   Cuff Size: Small Adult   Pulse: 57   Weight: 189 lb (85.7 kg)     Body mass index is 30.51 kg/m². BP Readings from Last 3 Encounters:   03/16/21 130/66   11/04/20 118/62   11/03/20 (!) 171/92        Wt Readings from Last 3 Encounters:   03/16/21 189 lb (85.7 kg)   11/03/20 173 lb 11.6 oz (78.8 kg)   10/15/20 173 lb 11.6 oz (78.8 kg)       Physical Exam  Vitals signs and nursing note reviewed. Constitutional:       Appearance: Normal appearance. HENT:      Head: Normocephalic and atraumatic. Eyes:      Extraocular Movements: Extraocular movements intact. Conjunctiva/sclera: Conjunctivae normal.      Pupils: Pupils are equal, round, and reactive to light. Neck:      Musculoskeletal: Normal range of motion. Neck rigidity present. Cardiovascular:      Rate and Rhythm: Normal rate and regular rhythm. Pulmonary:      Effort: Pulmonary effort is normal.      Breath sounds: Normal breath sounds. Musculoskeletal:        Arms:       Right lower leg: No edema. Left lower leg: No edema. Lymphadenopathy:      Cervical: No cervical adenopathy. Skin:     General: Skin is warm and dry. Neurological:      General: No focal deficit present. Mental Status: She is alert and oriented to person, place, and time. Cranial Nerves: No cranial nerve deficit. Motor: No weakness.                LABORATORY FINDINGS:    CBC:  Lab Results   Component Value Date    WBC 6.4 10/13/2020    HGB 13.0 10/13/2020     10/13/2020     BMP:    Lab Results   Component Value Date     10/13/2020    K 4.0 10/13/2020     10/13/2020    CO2 25 10/13/2020    BUN 12 10/13/2020    CREATININE 0.71 10/13/2020    GLUCOSE 99 10/13/2020     HEMOGLOBIN A1C:   Lab Results   Component Value Date    LABA1C 6.3 10/13/2020     MICROALBUMIN URINE:   Lab Results   Component Value Date    MICROALBUR 31 08/18/2017     FASTING LIPID PANEL:  Lab Results   Component Value Date    CHOL 224 (H) 10/15/2020    HDL 68 10/15/2020    TRIG 109 10/15/2020     Lab Results   Component Value Date    LDLCHOLESTEROL 134 (H) 10/15/2020       LIVER PROFILE:  Lab Results   Component Value Date    ALT 10 10/06/2017    AST 13 10/06/2017    PROT 7.0 10/06/2017    BILITOT 0.30 10/06/2017    LABALBU 3.8 10/06/2017      THYROID FUNCTION:   Lab Results   Component Value Date    TSH 0.13 10/13/2020      URINEANALYSIS: No results found for: LABURIN  ASSESSMENT AND PLAN:    1. Cervical radiculopathy  Advised to get MRI and EMG done. She does not want injections into her spine or any surgical procedures. Advised physical therapy. She is already seeing pain management. - Maykel Rosado MD, Neurology, [facility] Physical Therapy [facility]  - ALPRAZochintan Walker) 0.25 MG tablet; Take 1 tablet by mouth once as needed for Anxiety for up to 1 dose. Dispense: 2 tablet; Refill: 0    2. Essential hypertension  Continue current medications. Well-controlled. 3. IGT (impaired glucose tolerance)  Monitor yearly. 4. Moderate episode of recurrent major depressive disorder ([facility] [address]  Does not want to take medications or go to therapy. 5. Pure hypercholesterolemia  We will check labs next visit. FOLLOW UP AND INSTRUCTIONS:   Return in about 3 months (around 6/16/2021).     Yasmin Myron received counseling on the following healthy behaviors: nutrition, exercise and medication adherence    Reviewed prior labs and health maintenance. Discussed use, benefit, and side effects of prescribed medications. Barriers to medication compliance addressed. All patient questions answered. Pt voiced understanding.      Patient given educational materials - see patient instructions    Marbin Garcia  Attending Physician, [address] [place] [address], Internal Medicine Residency Program  [address] [place] [address]  3/16/2021, 4:23 PM

## 2021-03-22 ENCOUNTER — HOSPITAL ENCOUNTER (OUTPATIENT)
Dept: MRI IMAGING | Age: 61
Discharge: HOME | End: 2021-03-24
Payer: COMMERCIAL

## 2021-03-22 DIAGNOSIS — M54.12 CERVICAL RADICULOPATHY: ICD-10-CM

## 2021-03-22 PROCEDURE — 72141 MRI NECK SPINE W/O DYE: CPT

## 2021-03-24 RX ORDER — COLCHICINE 0.6 MG/1
TABLET ORAL
Qty: 30 TABLET | Refills: 1 | Status: SHIPPED | OUTPATIENT
Start: 2021-03-24 | End: 2021-05-12

## 2021-03-30 ENCOUNTER — HOSPITAL ENCOUNTER (OUTPATIENT)
Dept: PHYSICAL THERAPY | Facility: CLINIC | Age: 61
Setting detail: THERAPIES SERIES
Discharge: HOME | End: 2021-03-30
Payer: COMMERCIAL

## 2021-03-30 NOTE — FLOWSHEET NOTE
[] [facility] - [facility] &  Therapy  [address]    [phone]  F: [phone]   [x] [address]  [facility] [address]   [address]  P: [phone]  F: [phone]  [] [address] &  Therapy  [address]  P: [phone]  F: [phone] [] [address]  P: [phone]  F: [phone]  [] [address] [facility]   [address]   [place]: [phone]  F: [phone]   [] [place] [address] [facility] [address]  [place]: [phone]   F: [phone]     Physical Therapy Cancel/No Show note    Date: 3/30/2021  Patient: Nicole Ceron  : 1960  MRN: [id number]    Cancels/No Shows to date:     For today's appointment patient:    [x]  Cancelled    [] Rescheduled appointment    [] No-show     Reason given by patient:    []  Patient ill    [x]  Conflicting appointment    [] No transportation      [] Conflict with work    [] No reason given    [] Weather related    [] COVID-19    [] Other:      Comments:        [x] Next appointment was confirmed    Electronically signed by: Kay Landry PT

## 2021-04-06 ENCOUNTER — HOSPITAL ENCOUNTER (OUTPATIENT)
Dept: PHYSICAL THERAPY | Facility: CLINIC | Age: 61
Setting detail: THERAPIES SERIES
Discharge: HOME | End: 2021-04-06
Payer: COMMERCIAL

## 2021-04-06 NOTE — FLOWSHEET NOTE
[] [facility]) - [facility] &  Therapy  [address]    [phone]  F: [phone]   [x] [address]  [facility]   [address]  P: [phone]  F: [phone]  [] [address] &  Therapy  [address]  P: [phone]  F: [phone] [] [address]  P: [phone]  F: [phone]  [] [address]  [facility]   [address]   [place]: [phone]  F: [phone]   [] [place] [address] [facility] [address]  [place]: [phone]   F: [phone]     Physical Therapy Cancel/No Show note    Date: 2021  Patient: Orlando Lagos  : 1960  MRN: [id number]    Cancels/No Shows to date:     For today's appointment patient:    [x]  Cancelled    [] Rescheduled appointment    [] No-show     Reason given by patient:    []  Patient ill    []  Conflicting appointment    [x] No transportation      [] Conflict with work    [] No reason given    [] Weather related    [] [id number]    [] Other:      Comments:        [x] Next appointment was confirmed    Electronically signed by: Maci Corona PT

## 2021-04-09 ENCOUNTER — TELEPHONE (OUTPATIENT)
Dept: INTERNAL MEDICINE | Age: 61
End: 2021-04-09

## 2021-04-09 NOTE — LETTER
[address] [address]  Phone: [phone]  Fax: [phone]    Cortez Azar MD        April 9, 2021    [address]  [address]  [id number]      Dear Hugo Tripathi: This letter is a reminder that you may have diagnostic testing that has not been completed. It is important to your well-being that these test(s) are performed. Please find the outstanding test(s) attached. If you could please have these completed before your next appointment. You can have the test completed at any [facility] facility or Lab. Please see the order for scheduling instructions. Any testing that needs completed other than blood work or xray's please call [phone] to schedule an appointment. Otherwise can be done at any [facility]. Please call our office at Dept: [phone] for additional information on the outstanding tests or let us know if they have been completed so we may update your chart. If you have any questions or concerns, please don't hesitate to call.     Sincerely,        Cortez Azar MD

## 2021-04-12 NOTE — TELEPHONE ENCOUNTER
knees     Osteoarthritis of both shoulders     DJD (degenerative joint disease), lumbosacral     Essential hypertension     Hyperlipidemia     Depression     Obesity     Postlaminectomy syndrome, lumbar region     Shoulder bursitis     S/P lumbar spinal fusion     Encounter for medication monitoring     Chronic pain syndrome     Posterior dislocation of hip ([address]     History of total right hip replacement     Frequent PVCs     Acute cystitis without hematuria     Primary osteoarthritis of both hips     Right hip pain     History of total right hip arthroplasty     Hip instability     Instability of prosthetic hip ([address]

## 2021-04-13 ENCOUNTER — HOSPITAL ENCOUNTER (OUTPATIENT)
Dept: PHYSICAL THERAPY | Facility: CLINIC | Age: 61
Setting detail: THERAPIES SERIES
Discharge: HOME | End: 2021-04-13
Payer: COMMERCIAL

## 2021-04-13 PROCEDURE — 97162 PT EVAL MOD COMPLEX 30 MIN: CPT

## 2021-04-13 PROCEDURE — 97110 THERAPEUTIC EXERCISES: CPT

## 2021-04-13 NOTE — CONSULTS
[] [facility]        Outpatient Physical                Therapy       [address]       Phone: [phone]       Fax: [phone] [x] [facility]       Promotion at [address] [facility]       Phone: [phone]       Fax: [phone] [] [facility]      [facility] Prom[facility]    [address]     Phone: [phone]     Fax:  [phone]     Physical Therapy Spine Evaluation    Date:  2021  Patient: Denton Barrera  : 1960  MRN: [id number]  Physician: Chris Araya MD   Insurance: [facility] [place] [facility])  Medical Diagnosis: M54.12 (ICD-10-CM) - Cervical radiculopathy  Rehab Codes: M54.2, R29.3, M62.81  Onset Date: 2021  Next 's appt. : 21      Subjective:   CC: neck pain   HPI: (onset date): Pt is a 61 y.o. female with complaints of neck pain with radiation down to the R hand. Pt reports she went to pain management and got x-rays and an MRI and options of injections vs neck surgery were provided. Pt reports she wanted to try PT first as she is leery about having neck injections or surgeries. Pt reports she does have a hx of neck pain and she went to therapy in the past where they did traction and she thinks this helped. Pt notes her neck pain has been worsening over the past 2-3 months. Pt states her pain on average is a 6-7/10 and can increase to 10/10. Pt notes the lowest her pain gets to is a 4/10. Pt describes the pain as constant, dull, aching, shooting, burning, and numb. Pt reports at times her head and neck feel very heavy and get \"tired. \" Pt states she does get headaches that are in the front and the back of the head. Pt notes she does have numbness and tingling into the R hand and has had some instances of dropping items. Pt notes the pain/numbness/tingling can go into all the fingers but primarily the first 3. Pt notes pain down the backside of her arm as well.  Pt does not feel she has lost any fine motor control. Pt notes her pain is worsened when trying to lift things, positions, and movement. Pt also notes she does wake up due to pain and has difficulty falling asleep. Pt notes she has stopped driving because of her pain and doesn't lift her grand kids. Pt states she is able to complete house work but has to stop and take breaks. Pt notes her pain is improved with medication and laying down. Pt does have a pertinent hx of B shoulder repairs and lumbar surgery. Comorbidities:   [] Obesity [] Dialysis  [x] Other: HTN   [] Asthma/COPD [] Dementia [x] Other: hx of B shoulder repairs   [] Stroke [] Sleep apnea [x] Other: B [place]   [] Vascular disease [] Rheumatic disease [x] Other: OA  Current smoker- on way to quitting     Tests: [] X-Ray: [x] MRI:  [] Other:  Narrative   EXAMINATION:   MRI OF THE CERVICAL SPINE WITHOUT CONTRAST 3/22/2021 7:46 am       TECHNIQUE:   Multiplanar multisequence MRI of the cervical spine was performed without the   administration of intravenous contrast.       COMPARISON:   None.       HISTORY:   ORDERING SYSTEM PROVIDED HISTORY: Cervical radiculopathy   TECHNOLOGIST PROVIDED HISTORY:   Reason for Exam: right sided neck pain   Acuity: Chronic   Type of Exam: Subsequent/Follow-up   Additional signs and symptoms: right arm pain and neck pain for several months   Relevant Medical/Surgical History: no known injury       FINDINGS:   BONES/ALIGNMENT: There is normal alignment of the spine. The vertebral body   heights are maintained.  The bone marrow signal appears unremarkable.  No   there is no disc herniation or foraminal narrowing.  There is mild spinal   canal narrowing.  Fracture or destructive osseous lesion.  There is   degenerative disc disease with disc desiccation.  The intervertebral disc   heights are grossly maintained.  There is congenitally narrow cervical spinal   canal.       SPINAL CORD: No abnormal cord signal is seen.       SOFT TISSUES: No paraspinal mass identified.       C2-C3: There is no disc herniation or foraminal narrowing.  There is mild   spinal canal narrowing.       C3-C4: There is disc bulge with mild spinal canal narrowing and mild left   foraminal narrowing.       C4-C5: There is disc bulge with mild spinal canal narrowing, mild left and   minimal right foraminal narrowing.       C5-C6: There is disc bulge with moderate left foraminal narrowing.  No spinal   canal narrowing.       C6-C7: There is no disc herniation, spinal canal or foraminal stenosis.       C7-T1: There is no significant disc protrusion, spinal canal stenosis or   neural foraminal narrowing.       T1-2: There is central disc protrusion, indenting the ventral spinal cord,   with minimal spinal canal narrowing, mild left and minimal right foraminal   narrowing.       T1-2: There is central disc protrusion, narrowing the ventral CSF space,   without spinal canal or foraminal narrowing.           Impression   Degenerative disc disease as described above, exacerbating congenitally   narrow cervical spinal canal.       Spinal canal narrowing, mild at C2-3, C3-4, C4-5, minimal at T1-2.       Foraminal narrowing, moderate at left C5-6, mild at left C3-4, left C4-5 and   left T1-2. Medications: [x] Refer to full medical record [] None [] Other:  Allergies:      [x] Refer to full medical record [] None [] Other:    Function:  Hand Dominance  [x] Right  [] Left  Patient lives with: Live alone    In what type of home [x]? One story   []? Two story   []? Split level   Job Status []? Normal duty   []? Light duty   []? Off due to condition    []? Retired   [x]? Not employed   []? Disability  []? Other:  []?   Return to work:          ADL/IADL Previous level of function Current level of function   Bathing  [x] Independent  [] Assist [x] Independent  [] Assist   Dress/grooming [x] Independent  [] Assist [x] Independent  [] Assist   Transfer/mobility [x] Independent  [] Assist [x] Independent  [] Assist   Feeding [] Independent  [] Assist [x] Independent  [] Assist   Toileting [] Independent  [] Assist [x] Independent  [] Assist   Driving [x] Independent  [] Assist [] Independent  [x] Assist   Housekeeping [x] Independent  [] Assist [x] Independent  [] Assist   Grocery shop/meal prep [x] Independent  [] Assist [x] Independent  [] Assist          Yes  No Comments   Fatigue [] [x]    Fever/chills/sweats [] [x]    Malaise  [] [x]    Mental status change [] [x]    Paresthesias/numbness/weakness [x] [] LLE   Unexplained weight changes [] [x] Gained 11-13 lbs    Lightheaded/dizziness [] [x]    Shortness of breath [] [x]        Objective:    OBSERVATION No Deficit Deficit Not Tested Comments   Posture       Forward Head [] [x] []    Rounded Shoulders [] [x] []    Kyphosis [] [x] []    Palpation [] [x] [] Diffuse tenderness along the cervical and thoracic spine with tenderness along R subclavius, R SC joint, R SCM    Increased guarding noted   Sensation [x] [] []    Neurological [x] [] [] Negative berger's and clonus         STRENGTH  ROM    Left Right Cervical    C5 Shld Abd 5 4+ Flexion 38   Shld Flexion 5 4 Extension 35- movement hinges at lower cervical spine   Shld IR 4+ 4+ Rotation COMP WITH EXT L52   r 45   Shld ER 5 4 Sidebend L15 R20   C6 Elb Flex 5 5     C7 Elb Ext WFL WFL      (average) 45 lbs   52.7      IR behind back: increased pain   Upper trap compensation with scap retraction on R    Cervical MMT in sitting  Flexion: 4-/5  Extension: 3-/5 (poor)  Rotation and SB: very poor strength/control     TESTS (+/-) LEFT RIGHT Not Tested   ULTT: ulnar  - []   ULTT: median   +    ULTT: radial  +    Cerv. Comp - + []   Cerv. Distraction   []   Cerv.  Alar - - []   Vertebral Artery - - []   Adsons  - []   Gwenette Goodell  + at 21\" []   Deep nec flexor endurance test: not completed due to inability to maintain cervical retraction     FUNCTION Normal Difficult Unable   Sitting [] [x] []   Standing [] [x] progress to prior level of activity. Flexion: 45 degrees  Extension: 45 degrees  Rotation: 60 degrees B  SB: 20 degrees   ? Strength: Pt will be able to complete 10 cervical retractions in sitting and supine with proper muscle activation in order to demonstrate improved deep neck flexor activation to assist with head/neck stability. ? Function: Pt will score less than or equal to 40% on the NDI in order to demonstrate improved function on ADLs. Independent with Home Exercise Programs  Demonstrate Knowledge of fall prevention- not needed per Julita Verde  LTG: (to be met in 12 treatments)  ? Pain: Pt will report less than or equal to 2-3/10 neck pain with sitting and completing tasks, lifting, driving, and sleeping in order to improve QOL. ? ROM: Pt will demonstrate cervical ROM with less than or equal to 1-2/10 increase in pain in order to improve ability to drive, perform ADLs, and progress to prior level of activity. Flexion: 50 degrees  Extension: 50 degrees  Rotation: 70 degrees B   ? Strength: Pt will demonstrate 5/5 BUE strength along with \"good\" cervical muscle strength testing in sitting in order to improve ability to lift and maintain neutral head alignment without fatigue when completing ADLs. Pt will be able to complete the deep cervical neck flexor test for greater than or equal to 10\" with appropriate muscle activation for improved postural strength for ADL completion. ? Function: Pt will score less than or equal to 30% on the NDI in order to demonstrate improved function with ADLs.       Patient goals: \"just to decrease pain\"    Rehab Potential:  [x] Good  [] Fair  [] Poor   Suggested Professional Referral:  [x] No  [] Yes:  Barriers to Goal Achievement[de-identified]  [] No  [x] Yes: PMH, chronicity of symptoms   Domestic Concerns:  [x] No  [] Yes:    Pt. Education:  [x] Plans/Goals, Risks/Benefits discussed  [x] Home exercise program  Method of Education: [x] Verbal  [x] Demo  [x] Written- see exercise log for specific exercises   Comprehension of Education:  [x] Verbalizes understanding. [x] Demonstrates understanding. [x] Needs Review. [] Demonstrates/verbalizes understanding of HEP/Ed previously given. Treatment Plan:  [x] Therapeutic Exercise    [] Modalities:  [x] Therapeutic Activity    [] Ultrasound  [] Electrical Stimulation  [] Gait Training     []Massage       [x] Lumbar/Cervical Traction  [x] Neuromuscular Re-education [x] Cold/hotpack [] Iontophoresis: 4 mg/mL  [x] Instruction in HEP             Dexamethasone Sodium  [x] Manual Therapy             Phosphate 40-80 mAmin  [] Aquatic Therapy   [] Other:    []  Medication allergies reviewed for use of    Dexamethasone Sodium Phosphate 4mg/ml     with iontophoresis treatments. Pt is not allergic. Frequency:  2 x/week for 12 visits    Todays Treatment:  Modalities:   Exercises:  Exercise Reps/ Time Weight/ Level Comments   Chin tucks  x     Scapular retractions  x     Edcuation x  Increased time educating pt on proper execution of exercises and focusing on posture.  Pt also educated on progressive improvements in symptoms with compliance with HEp               Other:    Specific Instructions for next treatment: MANUAL THERAPY, POSTURAL STRENGTHENING     Evaluation Complexity:  History (Personal factors, comorbidities) [] 0 [] 1-2 [x] 3+   Exam (limitations, restrictions) [] 1-2 [] 3 [x] 4+   Clinical presentation (progression) [] Stable [x] Evolving  [] Unstable   Decision Making [] Low [x] Moderate [] High    [] Low Complexity [x] Moderate Complexity [] High Complexity         Treatment Charges: Mins Units   Evaluation  [] Low  [x] Mod  [] High ----- 1   []  Modalities     [x]  Ther Exercise 8 1   []  Manual Therapy     []  Ther Activities     []  Aquatics     []  Vasocompression     []  Other     Estimated Medicare Cost (as of 4/13/21): $106.04  Time in:  316      Time out: 400    Electronically signed by: Tim Michelle, PT        Physician Signature:________________________________Date:__________________  By signing above or cosigning this note, I have reviewed this plan of care and certify a need for medically necessary rehabilitation services.      *PLEASE SIGN ABOVE AND FAX BACK ALL PAGES*

## 2021-04-14 RX ORDER — OMEPRAZOLE 20 MG/1
CAPSULE, DELAYED RELEASE ORAL
Qty: 30 CAPSULE | Refills: 5 | Status: SHIPPED | OUTPATIENT
Start: 2021-04-14 | End: 2021-10-07

## 2021-04-16 NOTE — PLAN OF CARE
[] [facility]) - [facility] &  Therapy  [address]  [phone]  F: [phone] [x] [address]  [facility]   [address]  P: [phone]  F: [phone] [] [address] &  Therapy  [address]  P: [phone]  F: [phone] [] [address]  P: [phone]  F: [phone] [] [address]  [address] [facility]   [address]   [place]: [phone]  F: [phone]        Physical Therapy Plan of Care    Date:  4/15/2021  Patient: Jerman Moya  : 1960  MRN: [id number]  Physician: Rupesh Hartman MD                               Insurance: [facility] [employer] after eval)  Medical Diagnosis: M54.12 (ICD-10-CM) - Cervical radiculopathy             Rehab Codes: M54.2, R29.3, M62.81  Onset Date: 2021              Next 's appt. : 21    Subjective:   CC: neck pain   HPI: (onset date): Pt is a 61 y.o. female with complaints of neck pain with radiation down to the R hand. Pt reports she went to pain management and got x-rays and an MRI and options of injections vs neck surgery were provided. Pt reports she wanted to try PT first as she is leery about having neck injections or surgeries. Pt reports she does have a hx of neck pain and she went to therapy in the past where they did traction and she thinks this helped. Pt notes her neck pain has been worsening over the past 2-3 months. Pt states her pain on average is a 6-7/10 and can increase to 10/10. Pt notes the lowest her pain gets to is a 4/10. Pt describes the pain as constant, dull, aching, shooting, burning, and numb. Pt reports at times her head and neck feel very heavy and get \"tired. \" Pt states she does get headaches that are in the front and the back of the head.  Pt notes Pain: 4-7/10         [x]? ? ROM: decreased all planes                      [x]? ? Strength:  Decreased deep neck flexor and postural strength  [x]? ? Function: NDI: 25/50, 50% impairment   [x]? Postural Deviations: forward head and rounded shoulder posture                            STG: (to be met in 6 treatments)  1. ? Pain: Pt will report less than or equal to 4-5/10 neck pain with sitting and completing tasks, lifting, driving, and sleeping in order to improve QOL. 2. ? ROM: Pt will demonstrate cervical ROM with less than or equal to 1-2/10 increase in pain in order to improve ability to drive, perform ADLs, and progress to prior level of activity. a. Flexion: 45 degrees  b. Extension: 45 degrees  c. Rotation: 60 degrees B  d. SB: 20 degrees   3. ? Strength: Pt will be able to complete 10 cervical retractions in sitting and supine with proper muscle activation in order to demonstrate improved deep neck flexor activation to assist with head/neck stability. 4. ? Function: Pt will score less than or equal to 40% on the NDI in order to demonstrate improved function on ADLs. 5. Independent with Home Exercise Programs  6. Demonstrate Knowledge of fall prevention- not needed per Sruthi Phelps  LTG: (to be met in 12 treatments)  1. ? Pain: Pt will report less than or equal to 2-3/10 neck pain with sitting and completing tasks, lifting, driving, and sleeping in order to improve QOL. 7. ? ROM: Pt will demonstrate cervical ROM with less than or equal to 1-2/10 increase in pain in order to improve ability to drive, perform ADLs, and progress to prior level of activity. a. Flexion: 50 degrees  b. Extension: 50 degrees  c. Rotation: 70 degrees B   2. ? Strength: Pt will demonstrate 5/5 BUE strength along with \"good\" cervical muscle strength testing in sitting in order to improve ability to lift and maintain neutral head alignment without fatigue when completing ADLs. a.  Pt will be able to complete the deep cervical neck flexor test for greater than or equal to 10\" with appropriate muscle activation for improved postural strength for ADL completion. 3. ? Function: Pt will score less than or equal to 30% on the NDI in order to demonstrate improved function with ADLs.       Patient goals: \"just to decrease pain\"     Rehab Potential:  [x]? Good  []? Fair  []? Poor    Suggested Professional Referral:  [x]? No  []? Yes:  Barriers to Goal Achievement[de-identified]  []? No  [x]? Yes: PMH, chronicity of symptoms   Domestic Concerns:  [x]? No  []? Yes:     Pt. Education:  [x]? Plans/Goals, Risks/Benefits discussed  [x]? Home exercise program  Method of Education: [x]? Verbal  [x]? Demo  [x]? Written- see exercise log for specific exercises   Comprehension of Education:  [x]? Verbalizes understanding. [x]? Demonstrates understanding. [x]? Needs Review. []? Demonstrates/verbalizes understanding of HEP/Ed previously given.     Treatment Plan:  [x]? Therapeutic Exercise                     []? Modalities:  [x]? Therapeutic Activity                       []? Ultrasound              []? Electrical Stimulation  []? Gait Training                                  []?Massage                 [x]? Lumbar/Cervical Traction  [x]? Neuromuscular Re-education        [x]? Cold/hotpack          []? Iontophoresis: 4 mg/mL  [x]? Instruction in HEP                                                                   Dexamethasone Sodium  [x]? Manual Therapy                                                                      Phosphate 40-80 mAmin  []? Aquatic Therapy                             []? Other:     []? Medication allergies reviewed for use of               Dexamethasone Sodium Phosphate 4mg/ml                with iontophoresis treatments.               Pt is not allergic.     Frequency:  2 x/week for 12 visits  Electronically signed by: Linn Molina PT        Physician Signature:________________________________Date:__________________  By signing above or cosigning this note, I have reviewed this plan of care and certify a need for medically necessary rehabilitation services.      *PLEASE SIGN ABOVE AND FAX BACK ALL PAGES*

## 2021-04-19 ENCOUNTER — TELEPHONE (OUTPATIENT)
Dept: INTERNAL MEDICINE | Age: 61
End: 2021-04-19

## 2021-05-08 ENCOUNTER — HOSPITAL ENCOUNTER (OUTPATIENT)
Dept: MAMMOGRAPHY | Age: 61
Discharge: HOME | End: 2021-05-10
Payer: COMMERCIAL

## 2021-05-08 DIAGNOSIS — Z12.31 BREAST CANCER SCREENING BY MAMMOGRAM: ICD-10-CM

## 2021-05-08 PROCEDURE — 77067 SCR MAMMO BI INCL CAD: CPT

## 2021-05-08 PROCEDURE — 77063 BREAST TOMOSYNTHESIS BI: CPT

## 2021-05-10 NOTE — TELEPHONE ENCOUNTER
Request for Colchicine. Next Visit Date:  Future Appointments   Date Time Provider Opal Mia   6/29/2021  1:30 PM Harshal Benjamin MD [address] [facility]   Topic Date Due    Pneumococcal 0-64 years Vaccine (1 of 1 - PPSV23) Never done    COVID-19 Vaccine (1) Never done    Hepatitis C screen  07/28/2021 (Originally 1960)    HIV screen  10/13/2021 (Originally 6/27/1975)    DTaP/Tdap/Td vaccine (1 - Tdap) 03/16/2022 (Originally 6/27/1979)    Shingles Vaccine (1 of 2) 03/16/2022 (Originally 6/27/2010)    Flu vaccine (Season Ended) 09/01/2021    Annual Wellness Visit (AWV)  09/12/2021    A1C test (Diabetic or Prediabetic)  10/13/2021    Potassium monitoring  10/13/2021    Creatinine monitoring  10/13/2021    Breast cancer screen  05/08/2023    Colon cancer screen colonoscopy  12/29/2024    Lipid screen  10/15/2025    Hepatitis A vaccine  Aged Out    Hepatitis B vaccine  Aged Out    Hib vaccine  Aged Out    Meningococcal (ACWY) vaccine  Aged Out       Hemoglobin A1C (%)   Date Value   10/13/2020 6.3 (H)   09/03/2019 5.6   11/06/2018 6.0             ( goal A1C is < 7)   Microalb/Crt.  Ratio (mcg/mg creat)   Date Value   08/18/2017 21     LDL Cholesterol (mg/dL)   Date Value   10/15/2020 134 (H)       (goal LDL is <100)   AST (U/L)   Date Value   10/06/2017 13     ALT (U/L)   Date Value   10/06/2017 10     BUN (mg/dL)   Date Value   10/13/2020 12     BP Readings from Last 3 Encounters:   03/16/21 130/66   11/04/20 118/62   11/03/20 (!) 171/92          (goal 120/80)    All Future Testing planned in [facility]  Lab Frequency Next Occurrence   Uric Acid Once 08/06/2021   Uric Acid Once 07/09/2021         Patient Active Problem List:     Osteoarthritis of both knees     Osteoarthritis of both shoulders     DJD (degenerative joint disease), lumbosacral     Essential hypertension     Hyperlipidemia     Depression     Obesity     Postlaminectomy syndrome, lumbar region

## 2021-05-12 RX ORDER — COLCHICINE 0.6 MG/1
TABLET ORAL
Qty: 30 TABLET | Refills: 1 | Status: SHIPPED | OUTPATIENT
Start: 2021-05-12 | End: 2021-07-12

## 2021-07-08 ENCOUNTER — OFFICE VISIT (OUTPATIENT)
Dept: INTERNAL MEDICINE | Age: 61
End: 2021-07-08
Payer: COMMERCIAL

## 2021-07-08 VITALS
WEIGHT: 183 LBS | HEART RATE: 79 BPM | SYSTOLIC BLOOD PRESSURE: 126 MMHG | BODY MASS INDEX: 29.54 KG/M2 | DIASTOLIC BLOOD PRESSURE: 81 MMHG

## 2021-07-08 DIAGNOSIS — E78.00 PURE HYPERCHOLESTEROLEMIA: ICD-10-CM

## 2021-07-08 DIAGNOSIS — I10 ESSENTIAL HYPERTENSION: Primary | ICD-10-CM

## 2021-07-08 DIAGNOSIS — M75.51 BURSITIS OF RIGHT SHOULDER: ICD-10-CM

## 2021-07-08 DIAGNOSIS — R73.02 IGT (IMPAIRED GLUCOSE TOLERANCE): ICD-10-CM

## 2021-07-08 DIAGNOSIS — F33.1 MODERATE EPISODE OF RECURRENT MAJOR DEPRESSIVE DISORDER (HCC): ICD-10-CM

## 2021-07-08 LAB — HBA1C MFR BLD: 5.9 %

## 2021-07-08 PROCEDURE — 99213 OFFICE O/P EST LOW 20 MIN: CPT | Performed by: INTERNAL MEDICINE

## 2021-07-08 PROCEDURE — 3017F COLORECTAL CA SCREEN DOC REV: CPT | Performed by: INTERNAL MEDICINE

## 2021-07-08 PROCEDURE — 83036 HEMOGLOBIN GLYCOSYLATED A1C: CPT | Performed by: INTERNAL MEDICINE

## 2021-07-08 PROCEDURE — 99211 OFF/OP EST MAY X REQ PHY/QHP: CPT | Performed by: INTERNAL MEDICINE

## 2021-07-08 PROCEDURE — G8417 CALC BMI ABV UP PARAM F/U: HCPCS | Performed by: INTERNAL MEDICINE

## 2021-07-08 PROCEDURE — G8427 DOCREV CUR MEDS BY ELIG CLIN: HCPCS | Performed by: INTERNAL MEDICINE

## 2021-07-08 PROCEDURE — 4004F PT TOBACCO SCREEN RCVD TLK: CPT | Performed by: INTERNAL MEDICINE

## 2021-07-08 RX ORDER — AMLODIPINE BESYLATE 10 MG/1
10 TABLET ORAL DAILY
Qty: 30 TABLET | Refills: 5 | Status: SHIPPED | OUTPATIENT
Start: 2021-07-08 | End: 2021-11-23 | Stop reason: SDUPTHER

## 2021-07-08 RX ORDER — OXYCODONE HYDROCHLORIDE 5 MG/1
5 TABLET ORAL EVERY 6 HOURS PRN
COMMUNITY
Start: 2021-06-18 | End: 2022-09-20 | Stop reason: ALTCHOICE

## 2021-07-08 NOTE — PATIENT INSTRUCTIONS
-Pt due for 6 month f/u in January-- pt to call in [facility] to set up an appt--reminder in [facility] to contact patient as well--AVS given to patient    -Bloodwork orders given to patient, they will have them done before their next visit.     -Angela,CMA

## 2021-07-08 NOTE — PROGRESS NOTES
[facility]    Progress Note    Date of patient's visit: 7/8/2021    Patient's Name:  Xander Yu    YOB: 1960            Patient Care Team:  Nicolas Barrow MD as PCP - General (Internal Medicine)  Nicolas Barrow MD as PCP - [address] [place] [address] [facility]  Cooper Rosa MD as Consulting Physician (Rheumatology)  Refugio Covarrubias MD as Anesthesiologist (Pain Management)  Karol Stock MD (Orthopedic Surgery)  Dillon Alaniz MD as Surgeon (Orthopedic Surgery)  Ranjan Nixon MD as Anesthesiologist (Pain Management)    REASON FOR VISIT: Routine outpatient follow     Chief Complaint   Patient presents with    Hypertension    Health Maintenance     pneumo declined     Other     Pt wanted to know if ALS is hereditary or is there is testing that can be done to see if she has it.  Discuss Medications     Pt would like to discuss prilosec          HISTORY OF PRESENT ILLNESS:    History was obtained from the patient. Xander Yu is a 64 y.o. is here for aloe up. She continues to complain of upper back and neck pain on the right side. The right side hand still has some tingling and numbness. She feels the strength is improved. She never went to see neurology that she had requested and did not get EMG done. She also never followed up with physical therapy after a couple of visits. She is getting Percocet 4 times a day from her pain management. She does not want to see neurosurgeon or have surgery. She had an MRI done. She does have some moderate disc disease but very mild neural foraminal stenosis. She has chronic low back failed back surgery. She has been advised to get MRI of her lumbar spine by pain management as she was complaining of increasing low back pain. Patient says she is not sure what she wants to do with her neck and shoulder. She has been offered injections which she refuses. She is taking all her medications.   She also has some chronic shoulder pain and was seeing orthopedics in the past.  Blood pressure is well controlled. She had a mammogram which was normal.      Results for POC orders placed in visit on 07/08/21   POCT glycosylated hemoglobin (Hb A1C)   Result Value Ref Range    Hemoglobin A1C 5.9 %     MRI C spine 2021  Impression   Degenerative disc disease as described above, exacerbating congenitally   narrow cervical spinal canal.       Spinal canal narrowing, mild at C2-3, C3-4, C4-5, minimal at T1-2.       Foraminal narrowing, moderate at left C5-6, mild at left C3-4, left C4-5 and   left T1-2. Xrays 2021  Impression   Mild cervical spondylotic changes.       Similar lumbar spondylotic changes with previous L4-L5 fusion.       Mild degenerative changes of the right shoulder.             Past Medical History:   Diagnosis Date    CAD (coronary artery disease)     pt denies, CARDIAC CATH O.K., STRESS O.K.    Depression 7/30/2013    Fibromyalgia     Full dentures     GERD (gastroesophageal reflux disease)     Gout     History of blood transfusion 1978    History of hematuria     micro    Hyperlipidemia 7/30/2013    Hypertension     Obesity 7/30/2013    Osteoarthritis     Pain management     sees [address] [place] [address] pain clinic    Wears glasses        Past Surgical History:   Procedure Laterality Date    CARDIAC CATHETERIZATION      Several years ago (Written 10/15/2020). No stents placed per pt.      COLONOSCOPY      polyps removed    FOOT SURGERY  1968    HYSTERECTOMY      HYSTERECTOMY, TOTAL ABDOMINAL  1997    JOINT REPLACEMENT      NERVE BLOCK  2/5/14    caudal #1  celestone 9mg    NERVE BLOCK  2/11/14    caudal #2  celestone 9mg    NERVE BLOCK  02/18/14    caudal# 3, celestone 9 mg    OTHER SURGICAL HISTORY      SPINAL ENDOSCOPY X3    SHOULDER ARTHROSCOPY Left 06/13/15    SHOULDER SURGERY  Right; 2011    SPINE SURGERY  2009    TOTAL HIP ARTHROPLASTY Right 10/12/2016    TOTAL HIP ARTHROPLASTY Left 11/3/2020    LEFT HIP TOTAL ARTHROPLASTY ANTERIOR APPROACH- DEPUY performed by Neto Regan MD at [address] [Aspirin] Nausea Only    Dye [Iodides]     Eggs Or Egg-Derived Products     Ibuprofen Nausea Only    Neurontin [Gabapentin] Nausea Only    Shellfish-Derived Products     Shrimp Flavor      TROUBLE BREATHING    Tape Alejandra Belcher Tape]        MEDICATIONS:      Current Outpatient Medications on File Prior to Visit   Medication Sig Dispense Refill    oxyCODONE (ROXICODONE) 5 MG immediate release tablet Take 5 mg by mouth every 6 hours as needed.  colchicine (COLCRYS) 0.6 MG tablet take 1 tablet by mouth once daily 30 tablet 1    omeprazole (PRILOSEC) 20 MG delayed release capsule take 1 capsule by mouth once daily 30 to 60 MINUTES before FIRST MEAL OF THE DAY 30 capsule 5    amLODIPine (NORVASC) 10 MG tablet Take 1 tablet by mouth daily 30 tablet 5    Multiple Vitamins-Minerals (THERAPEUTIC MULTIVITAMIN-MINERALS) tablet Take 1 tablet by mouth daily 30 tablet 5     No current facility-administered medications on file prior to visit. HISTORY    Reviewed and no change from previous record. Kristi Potter  reports that she has been smoking cigarettes and e-cigarettes. She has a 6.25 pack-year smoking history. She has never used smokeless tobacco.    FAMILY HISTORY:    Reviewed and No change from previous visit    HEALTH MAINTENANCE DUE:      Health Maintenance Due   Topic Date Due    Pneumococcal 0-64 years Vaccine (1 of 2 - PPSV23) Never done       REVIEW OF SYSTEMS:    12 point review of symptoms completed and found to be normal except noted in the HPI    Review of Systems     Constitutional: Negative for chills, fatigue and fever. Respiratory: Negative for cough, shortness of breath and wheezing. Cardiovascular: Negative for chest pain, palpitations and leg swelling. Gastrointestinal: Positive for constipation.  Negative for abdominal HGB 13.0 10/13/2020     10/13/2020     BMP:    Lab Results   Component Value Date     10/13/2020    K 4.0 10/13/2020     10/13/2020    CO2 25 10/13/2020    BUN 12 10/13/2020    CREATININE 0.71 10/13/2020    GLUCOSE 99 10/13/2020     HEMOGLOBIN A1C:   Lab Results   Component Value Date    LABA1C 6.3 10/13/2020     MICROALBUMIN URINE:   Lab Results   Component Value Date    MICROALBUR 31 08/18/2017     FASTING LIPID PANEL:  Lab Results   Component Value Date    CHOL 224 (H) 10/15/2020    HDL 68 10/15/2020    TRIG 109 10/15/2020     Lab Results   Component Value Date    LDLCHOLESTEROL 134 (H) 10/15/2020       LIVER PROFILE:  Lab Results   Component Value Date    ALT 10 10/06/2017    AST 13 10/06/2017    PROT 7.0 10/06/2017    BILITOT 0.30 10/06/2017    LABALBU 3.8 10/06/2017      THYROID FUNCTION:   Lab Results   Component Value Date    TSH 0.13 10/13/2020      URINEANALYSIS: No results found for: LABURIN  ASSESSMENT AND PLAN:    1. Essential hypertension    - Basic Metabolic Panel; Future  - amLODIPine (NORVASC) 10 MG tablet; Take 1 tablet by mouth daily  Dispense: 30 tablet; Refill: 5    2. IGT (impaired glucose tolerance)    - POCT glycosylated hemoglobin (Hb A1C)  - Basic Metabolic Panel; Future    3. Moderate episode of recurrent major depressive disorder ([facility] [address]  Refusing therapy     4. Pure hypercholesterolemia    - Lipid Panel; Future    5. Bursitis of right shoulder    - Uric Acid; Future          FOLLOW UP AND INSTRUCTIONS:   Return in about 6 months (around 1/8/2022). April Service received counseling on the following healthy behaviors: nutrition, exercise and medication adherence    Reviewed prior labs and health maintenance. Discussed use, benefit, and side effects of prescribed medications. Barriers to medication compliance addressed. All patient questions answered. Pt voiced understanding.      Patient given educational materials - see patient instructions    Lakeshia MD Thien  Attending Physician, [address] [place] [address], Internal Medicine Residency Program  [address] [place] [address]  7/8/2021, 1:45 PM

## 2021-07-12 RX ORDER — COLCHICINE 0.6 MG/1
TABLET ORAL
Qty: 30 TABLET | Refills: 1 | Status: SHIPPED | OUTPATIENT
Start: 2021-07-12 | End: 2021-09-07

## 2021-07-20 ENCOUNTER — TELEPHONE (OUTPATIENT)
Dept: INTERNAL MEDICINE | Age: 61
End: 2021-07-20

## 2021-07-20 DIAGNOSIS — M54.12 CERVICAL RADICULOPATHY: Primary | ICD-10-CM

## 2021-07-26 ENCOUNTER — HOSPITAL ENCOUNTER (OUTPATIENT)
Dept: MRI IMAGING | Age: 61
Discharge: HOME | End: 2021-07-28
Payer: COMMERCIAL

## 2021-07-26 ENCOUNTER — HOSPITAL ENCOUNTER (OUTPATIENT)
Age: 61
Discharge: HOME | End: 2021-07-26
Payer: COMMERCIAL

## 2021-07-26 DIAGNOSIS — I10 ESSENTIAL HYPERTENSION: ICD-10-CM

## 2021-07-26 DIAGNOSIS — M75.51 BURSITIS OF RIGHT SHOULDER: ICD-10-CM

## 2021-07-26 DIAGNOSIS — E78.00 PURE HYPERCHOLESTEROLEMIA: ICD-10-CM

## 2021-07-26 DIAGNOSIS — M96.1 LUMBAR POST-LAMINECTOMY SYNDROME: ICD-10-CM

## 2021-07-26 DIAGNOSIS — R73.02 IGT (IMPAIRED GLUCOSE TOLERANCE): ICD-10-CM

## 2021-07-26 DIAGNOSIS — G89.4 CHRONIC PAIN SYNDROME: ICD-10-CM

## 2021-07-26 LAB
ANION GAP SERPL CALCULATED.3IONS-SCNC: 15 MMOL/L (ref 9–17)
BUN BLDV-MCNC: 11 MG/DL (ref 8–23)
BUN/CREAT BLD: ABNORMAL (ref 9–20)
CALCIUM SERPL-MCNC: 9.9 MG/DL (ref 8.6–10.4)
CHLORIDE BLD-SCNC: 109 MMOL/L (ref 98–107)
CHOLESTEROL/HDL RATIO: 3.5
CHOLESTEROL: 247 MG/DL
CO2: 21 MMOL/L (ref 20–31)
CREAT SERPL-MCNC: 0.65 MG/DL (ref 0.5–0.9)
CREAT SERPL-MCNC: 0.7 MG/DL (ref 0.5–0.9)
GFR AFRICAN AMERICAN: >60 ML/MIN
GFR AFRICAN AMERICAN: >60 ML/MIN
GFR NON-AFRICAN AMERICAN: >60 ML/MIN
GFR NON-AFRICAN AMERICAN: >60 ML/MIN
GFR SERPL CREATININE-BSD FRML MDRD: ABNORMAL ML/MIN/{1.73_M2}
GFR SERPL CREATININE-BSD FRML MDRD: ABNORMAL ML/MIN/{1.73_M2}
GFR SERPL CREATININE-BSD FRML MDRD: NORMAL ML/MIN/{1.73_M2}
GFR SERPL CREATININE-BSD FRML MDRD: NORMAL ML/MIN/{1.73_M2}
GLUCOSE BLD-MCNC: 98 MG/DL (ref 70–99)
HDLC SERPL-MCNC: 70 MG/DL
LDL CHOLESTEROL: 156 MG/DL (ref 0–130)
POTASSIUM SERPL-SCNC: 4.4 MMOL/L (ref 3.7–5.3)
SODIUM BLD-SCNC: 145 MMOL/L (ref 135–144)
TRIGL SERPL-MCNC: 106 MG/DL
VLDLC SERPL CALC-MCNC: ABNORMAL MG/DL (ref 1–30)

## 2021-07-26 PROCEDURE — 80061 LIPID PANEL: CPT

## 2021-07-26 PROCEDURE — 80048 BASIC METABOLIC PNL TOTAL CA: CPT

## 2021-07-26 PROCEDURE — 84550 ASSAY OF BLOOD/URIC ACID: CPT

## 2021-07-26 PROCEDURE — 82565 ASSAY OF CREATININE: CPT

## 2021-07-26 PROCEDURE — 36415 COLL VENOUS BLD VENIPUNCTURE: CPT

## 2021-07-26 PROCEDURE — 6360000004 HC RX CONTRAST MEDICATION: Performed by: NURSE PRACTITIONER

## 2021-07-26 PROCEDURE — A9579 GAD-BASE MR CONTRAST NOS,1ML: HCPCS | Performed by: NURSE PRACTITIONER

## 2021-07-26 PROCEDURE — 72158 MRI LUMBAR SPINE W/O & W/DYE: CPT

## 2021-07-26 RX ADMIN — GADOTERIDOL 17 ML: 279.3 INJECTION, SOLUTION INTRAVENOUS at 12:25

## 2021-07-27 LAB — URIC ACID: 5.5 MG/DL (ref 2.4–5.7)

## 2021-09-07 RX ORDER — COLCHICINE 0.6 MG/1
TABLET ORAL
Qty: 30 TABLET | Refills: 1 | Status: SHIPPED | OUTPATIENT
Start: 2021-09-07 | End: 2021-10-28 | Stop reason: SDUPTHER

## 2021-10-06 NOTE — TELEPHONE ENCOUNTER
Request for [facility] . Next Visit Date:  Future Appointments   Date Time Provider Opal Shafferi   11/5/2021 10:30 AM Margaret Stevens MD [address] [facility]   Topic Date Due    Hepatitis C screen  Never done    Flu vaccine (1) Never done    Annual Wellness Visit (AWV)  09/12/2021    HIV screen  10/13/2021 (Originally 6/27/1975)    Pneumococcal 0-64 years Vaccine (1 of 2 - PPSV23) 01/08/2022 (Originally 6/27/1966)    DTaP/Tdap/Td vaccine (1 - Tdap) 03/16/2022 (Originally 6/27/1979)    Shingles Vaccine (1 of 2) 03/16/2022 (Originally 6/27/2010)    A1C test (Diabetic or Prediabetic)  07/08/2022    Potassium monitoring  07/26/2022    Creatinine monitoring  07/26/2022    Breast cancer screen  05/08/2023    Colon cancer screen colonoscopy  12/29/2024    Lipid screen  07/26/2026    COVID-19 Vaccine  Completed    Hepatitis A vaccine  Aged Out    Hepatitis B vaccine  Aged Out    Hib vaccine  Aged Out    Meningococcal (ACWY) vaccine  Aged Out       Hemoglobin A1C (%)   Date Value   07/08/2021 5.9   10/13/2020 6.3 (H)   09/03/2019 5.6             ( goal A1C is < 7)   Microalb/Crt.  Ratio (mcg/mg creat)   Date Value   08/18/2017 21     LDL Cholesterol (mg/dL)   Date Value   07/26/2021 156 (H)       (goal LDL is <100)   AST (U/L)   Date Value   10/06/2017 13     ALT (U/L)   Date Value   10/06/2017 10     BUN (mg/dL)   Date Value   07/26/2021 11     BP Readings from Last 3 Encounters:   07/08/21 126/81   03/16/21 130/66   11/04/20 118/62          (goal 120/80)    All Future Testing planned in [facility]  Lab Frequency Next Occurrence   Uric Acid Once 07/09/2021         Patient Active Problem List:     Osteoarthritis of both knees     Osteoarthritis of both shoulders     DJD (degenerative joint disease), lumbosacral     Essential hypertension     Hyperlipidemia     Depression     Obesity     Postlaminectomy syndrome, lumbar region     Shoulder bursitis     S/P lumbar spinal fusion Encounter for medication monitoring     Chronic pain syndrome     Posterior dislocation of hip ([facility] [address]     History of total right hip replacement     Frequent PVCs     Acute cystitis without hematuria     Primary osteoarthritis of both hips     Right hip pain     History of total right hip arthroplasty     Hip instability     Instability of prosthetic hip ([facility] [address]

## 2021-10-07 RX ORDER — OMEPRAZOLE 20 MG/1
CAPSULE, DELAYED RELEASE ORAL
Qty: 90 CAPSULE | Refills: 1 | Status: SHIPPED | OUTPATIENT
Start: 2021-10-07 | End: 2022-03-22

## 2021-10-27 ENCOUNTER — TELEPHONE (OUTPATIENT)
Dept: INTERNAL MEDICINE | Age: 61
End: 2021-10-27

## 2021-10-28 ENCOUNTER — VIRTUAL VISIT (OUTPATIENT)
Dept: INTERNAL MEDICINE | Age: 61
End: 2021-10-28
Payer: COMMERCIAL

## 2021-10-28 DIAGNOSIS — Z11.4 SCREENING FOR HIV WITHOUT PRESENCE OF RISK FACTORS: ICD-10-CM

## 2021-10-28 DIAGNOSIS — Z71.89 ACP (ADVANCE CARE PLANNING): ICD-10-CM

## 2021-10-28 DIAGNOSIS — Z00.00 ROUTINE GENERAL MEDICAL EXAMINATION AT A HEALTH CARE FACILITY: ICD-10-CM

## 2021-10-28 DIAGNOSIS — Z11.59 NEED FOR HEPATITIS C SCREENING TEST: ICD-10-CM

## 2021-10-28 PROCEDURE — G0439 PPPS, SUBSEQ VISIT: HCPCS | Performed by: NURSE PRACTITIONER

## 2021-10-28 PROCEDURE — G0444 DEPRESSION SCREEN ANNUAL: HCPCS | Performed by: NURSE PRACTITIONER

## 2021-10-28 PROCEDURE — G8484 FLU IMMUNIZE NO ADMIN: HCPCS | Performed by: NURSE PRACTITIONER

## 2021-10-28 PROCEDURE — 3017F COLORECTAL CA SCREEN DOC REV: CPT | Performed by: NURSE PRACTITIONER

## 2021-10-28 RX ORDER — M-VIT,TX,IRON,MINS/CALC/FOLIC 27MG-0.4MG
1 TABLET ORAL DAILY
Qty: 30 TABLET | Refills: 5 | Status: CANCELLED | OUTPATIENT
Start: 2021-10-28

## 2021-10-28 RX ORDER — OMEPRAZOLE 20 MG/1
CAPSULE, DELAYED RELEASE ORAL
Qty: 90 CAPSULE | Refills: 1 | Status: CANCELLED | OUTPATIENT
Start: 2021-10-28

## 2021-10-28 RX ORDER — COLCHICINE 0.6 MG/1
TABLET ORAL
Qty: 30 TABLET | Refills: 0 | Status: SHIPPED | OUTPATIENT
Start: 2021-10-28 | End: 2021-12-20

## 2021-10-28 ASSESSMENT — PATIENT HEALTH QUESTIONNAIRE - PHQ9
SUM OF ALL RESPONSES TO PHQ QUESTIONS 1-9: 0
1. LITTLE INTEREST OR PLEASURE IN DOING THINGS: 0
SUM OF ALL RESPONSES TO PHQ QUESTIONS 1-9: 0
2. FEELING DOWN, DEPRESSED OR HOPELESS: 0
SUM OF ALL RESPONSES TO PHQ9 QUESTIONS 1 & 2: 0
SUM OF ALL RESPONSES TO PHQ QUESTIONS 1-9: 0

## 2021-10-28 ASSESSMENT — LIFESTYLE VARIABLES: HOW OFTEN DO YOU HAVE A DRINK CONTAINING ALCOHOL: 0

## 2021-10-28 NOTE — PROGRESS NOTES
Medicare Annual Wellness Visit  Are Name: Aaliyah Gao Date: 10/28/2021   MRN: [id number] Sex: Female   Age: 64 y.o. Ethnicity: Non- / Non    : 1960 Race: Other      Doretha Velasquezs is here for Medicare AWV (labs ordered vaccine declined)    Screenings for behavioral, psychosocial and functional/safety risks, and cognitive dysfunction are all negative except as indicated below. These results, as well as other patient data from the [address] form, are documented in Flowsheets linked to this Encounter. Allergies   Allergen Reactions    Asa [Aspirin] Nausea Only    Dye [Iodides]     Eggs Or Egg-Derived Products     Ibuprofen Nausea Only    Neurontin [Gabapentin] Nausea Only    Shellfish-Derived Products     Shrimp Flavor      TROUBLE BREATHING    Tape [Adhesive Tape]          Prior to Visit Medications    Medication Sig Taking? Authorizing Provider   colchicine (COLCRYS) 0.6 MG tablet take 1 tablet by mouth once daily Yes MARILYN Alex - CNP   omeprazole (PRILOSEC) 20 MG delayed release capsule take 1 capsule by mouth once daily 30 to 60 MINUTES before [address] Yes Margaret Stevens MD   oxyCODONE (ROXICODONE) 5 MG immediate release tablet Take 5 mg by mouth every 6 hours as needed.  Yes Historical Provider, MD   amLODIPine (NORVASC) 10 MG tablet Take 1 tablet by mouth daily Yes Margaret Stevens MD   Multiple Vitamins-Minerals (THERAPEUTIC MULTIVITAMIN-MINERALS) tablet Take 1 tablet by mouth daily Yes Margaret Stevens MD         Past Medical History:   Diagnosis Date    CAD (coronary artery disease)     pt denies, CARDIAC CATH O.K., STRESS O.K.    Depression 2013    Fibromyalgia     Full dentures     GERD (gastroesophageal reflux disease)     Gout     History of blood transfusion 1978    History of hematuria     micro    Hyperlipidemia 2013    Hypertension     Obesity 2013    Osteoarthritis     Pain management     [facility]    Wears glasses        Past Surgical History:   Procedure Laterality Date    CARDIAC CATHETERIZATION      Several years ago (Written 10/15/2020). No stents placed per pt.      COLONOSCOPY      polyps removed    FOOT SURGERY  1968    HYSTERECTOMY      HYSTERECTOMY, TOTAL ABDOMINAL  1997    JOINT REPLACEMENT      NERVE BLOCK  2/5/14    caudal #1  celestone 9mg    NERVE BLOCK  2/11/14    caudal #2  celestone 9mg    NERVE BLOCK  02/18/14    caudal# 3, celestone 9 mg    OTHER SURGICAL HISTORY      SPINAL ENDOSCOPY X3    SHOULDER ARTHROSCOPY Left 06/13/15    SHOULDER SURGERY  Right; 2011    SPINE SURGERY  2009    TOTAL HIP ARTHROPLASTY Right 10/12/2016    TOTAL HIP ARTHROPLASTY Left 11/3/2020    LEFT HIP TOTAL ARTHROPLASTY ANTERIOR APPROACH- DEPUY performed by Ina Holstein, MD at [address] History   Problem Relation Age of Onset    COPD Mother     Coronary Art Dis Father     Cancer Sister     Coronary Art Dis Sister     Cirrhosis Sister     Arthritis Brother     Asthma Brother     Arthritis Sister     Asthma Sister     Cancer Sister     Asthma Sister     Cancer Sister     Asthma Sister     Asthma Brother     Arthritis Brother        CareTeam (Including outside providers/suppliers regularly involved in providing care):   Patient Care Team:  India Walker MD as PCP - General (Internal Medicine)  India Walker MD as PCP - [address] [place] [address]  [facility] Provider  Ronan Beltre MD as Consulting Physician (Rheumatology)  Nena Nichols MD as Anesthesiologist (Pain Management)  Mary Zepeda MD (Orthopedic Surgery)  Nima Garrison MD as Surgeon (Orthopedic Surgery)  Jameson Barbour MD as Anesthesiologist (Pain Management)    Wt Readings from Last 3 Encounters:   07/08/21 183 lb (83 kg)   03/16/21 189 lb (85.7 kg)   11/03/20 173 lb 11.6 oz (78.8 kg)      Patient-Reported Vitals 10/28/2021   Patient-Reported Weight 183.0lb   Patient-Reported Height 5' 6 Patient-Reported Systolic 594   Patient-Reported Diastolic 82   Patient-Reported Pulse 72      There is no height or weight on file to calculate BMI. Based upon direct observation of the patient, evaluation of cognition reveals recent and remote memory intact. Wt Readings from Last 3 Encounters:   07/08/21 183 lb (83 kg)   03/16/21 189 lb (85.7 kg)   11/03/20 173 lb 11.6 oz (78.8 kg)     Temp Readings from Last 3 Encounters:   11/04/20 97.9 °F (36.6 °C) (Oral)   11/03/20 96.6 °F (35.9 °C)   10/15/20 96.8 °F (36 °C) (Temporal)     BP Readings from Last 3 Encounters:   07/08/21 126/81   03/16/21 130/66   11/04/20 118/62     Pulse Readings from Last 3 Encounters:   07/08/21 79   03/16/21 57   11/04/20 68       Patient's complete Health Risk Assessment and screening values have been reviewed and are found in Flowsheets. The following problems were reviewed today and where indicated follow up appointments were made and/or referrals ordered. Positive Risk Factor Screenings with Interventions:         Substance History:  Social History     Tobacco History     Smoking Status  Current Every Day Smoker Smoking Frequency  0.25 packs/day for 25 years (6.25 pk yrs) Smoking Tobacco Type  Cigarettes, E-Cigarettes    Smokeless Tobacco Use  Never Used          Alcohol History     Alcohol Use Status  Yes Drinks/Week  0 Standard drinks or equivalent per week Amount  0.0 standard drinks of alcohol/wk Comment  occasionally          Drug Use     Drug Use Status  Yes Types  Marijuana Comment  crack YRS AGO          Sexual Activity     Sexually Active  Yes               Alcohol Screening:       A score of 8 or more is associated with harmful or hazardous drinking. A score of 13 or more in women, and 15 or more in men, is likely to indicate alcohol dependence. Substance Abuse Interventions:  · Tobacco abuse:  patient agrees to try the following tobacco cessation strategies:  gradual reduction of tobacco use: Odalis Corado     [facility] and ACP:  General  In general, how would you say your health is?: Fair  In the past 7 days, have you experienced any of the following?  New or Increased Pain, New or Increased Fatigue, Loneliness, Social Isolation, Stress or Anger?: None of These  Do you get the social and emotional support that you need?: Yes  Do you have a Living Will?: (!) No  Advance Directives     Power of [facility] Will ACP-Advance Directive ACP-Power of     Not on File Not on File Not on File Not on File      General Health Risk Interventions:  · No Living Will: Advance Care Planning addressed with patient today and info sent with AVS    Health Habits/Nutrition:  Health Habits/Nutrition  Do you exercise for at least 20 minutes 2-3 times per week?: Yes  Have you lost any weight without trying in the past 3 months?: No  Do you eat only one meal per day?: No  Have you seen the dentist within the past year?: (!) No     Health Habits/Nutrition Interventions:  · Dental exam overdue:  patient encouraged to make appointment with his/her dentist       Personalized Preventive Plan   Current Health Maintenance Status  Immunization History   Administered Date(s) Administered    COVID-19, [place], PF, 30mcg/0.3mL 04/08/2021, 04/29/2021        Health Maintenance   Topic Date Due    Hepatitis C screen  Never done    HIV screen  Never done   [employer] Visit (AWV)  09/12/2021    COVID-19 Vaccine (3 - Pfizer booster) 10/29/2021    Pneumococcal 0-64 years Vaccine (1 of 2 - PPSV23) 01/08/2022 (Originally 6/27/1966)    DTaP/Tdap/Td vaccine (1 - Tdap) 03/16/2022 (Originally 6/27/1979)    Shingles Vaccine (1 of 2) 03/16/2022 (Originally 6/27/2010)    Flu vaccine (1) 10/28/2022 (Originally 9/1/2021)    A1C test (Diabetic or Prediabetic)  07/08/2022    Potassium monitoring  07/26/2022    Creatinine monitoring  07/26/2022    Breast cancer screen  05/08/2023    Colon cancer screen colonoscopy  12/29/2024    Lipid screen  07/26/2026    Hepatitis A vaccine  Aged Out    Hepatitis B vaccine  Aged Out    Hib vaccine  Aged Out    Meningococcal (ACWY) vaccine  Aged Out     Recommendations for [employer] Due: see orders and patient instructions/AVS.  . Recommended screening schedule for the next 5-10 years is provided to the patient in written form: see Patient Stephanie Hernández was seen today for medicare awv. Diagnoses and all orders for this visit:    ACP (advance care planning)    Need for hepatitis C screening test  -     Hepatitis C Antibody; Future    Screening for HIV without presence of risk factors  -     HIV Screen; Future    Routine general medical examination at a health care facility  -     Hepatitis C Antibody; Future  -     HIV Screen; Future  -     MO DEPRESSION SCREEN ANNUAL    Other orders  -     colchicine (COLCRYS) 0.6 MG tablet; take 1 tablet by mouth once daily               Edouard Alfredo is a 64 y.o. female being evaluated by a Virtual Visit (phone) encounter to address concerns as mentioned above. A caregiver was present when appropriate. Due to this being a TeleHealth encounter (During [facility]- public health emergency), evaluation of the following organ systems was limited: Vitals/Constitutional/EENT/Resp/CV/GI//MS/Neuro/Skin/Heme-Lymph-Imm. Pursuant to the emergency declaration under the [facility] [address] authority and the [place] [employer] ([employer]) and [employer] Act, this Virtual Visit was conducted with patient's (and/or legal guardian's) consent, to reduce the patient's risk of exposure to COVID-19 and provide necessary medical care. The patient (and/or legal guardian) has also been advised to contact this office for worsening conditions or problems, and seek emergency medical treatment and/or call 911 if deemed necessary.      Patient identification was verified at the start of the visit: Yes    Services were provided through phone to substitute for in-person clinic visit. Patient and provider were located at their individual homes. --MARILYN Ellington CNP on 10/28/2021 at 11:32 AM    An electronic signature was used to authenticate this note.

## 2021-10-28 NOTE — PATIENT INSTRUCTIONS
(Maybe you're afraid of having pain, losing your independence, or being kept alive by machines.)  · Where would you prefer to die? (Your home? A hospital? A nursing home?)  · Do you want to donate your organs when you die? · Do you want certain Voodoo practices performed before you die? When should you call for help? Be sure to contact your doctor if you have any questions. Where can you learn more? Go to [email or web address] and sign in to your [employer] account. Enter R264 in the [employer]hop box to learn more about \"Advance Directives: Care Instructions. \"     If you do not have an account, please click on the \"Sign Up Now\" link. Current as of: March 17, 2021               Content Version: 13.0  © 2006-2021 Healthwise, [employer]. Care instructions adapted under license by [facility] [place] ([facility]). If you have questions about a medical condition or this instruction, always ask your healthcare professional. [place] [address] any warranty or liability for your use of this information. Learning About Medical Power of   What is a medical power of ? A medical power of , also called a durable power of  for health care, is one type of the legal forms called advance directives. It lets you name the person you want to make treatment decisions for you if you can't speak or decide for yourself. The person you choose is called your health care agent. This person is also called a health care proxy or health care surrogate. A medical power of  may be called something else in your state. How do you choose a health care agent? Choose your health care agent carefully. This person may or may not be a family member. Talk to the person before you make your final decision. Make sure he or she is comfortable with this responsibility. It's a good idea to choose someone who:  · Is at least 25years old.   · Knows you well and understands what makes life meaningful for you. · Understands your Holiness and moral values. · Will do what you want, not what he or she wants. · Will be able to make difficult choices at a stressful time. · Will be able to refuse or stop treatment, if that is what you would want, even if you could die. · Will be firm and confident with health professionals if needed. · Will ask questions to get needed information. · Lives near you or agrees to travel to you if needed. Your family may help you make medical decisions while you can still be part of that process. But it's important to choose one person to be your health care agent in case you aren't able to make decisions for yourself. If you don't fill out the legal form and name a health care agent, the decisions your family can make may be limited. A health care agent may be called something else in your state. Who will make decisions for you if you don't have a health care agent? If you don't have a health care agent or a living will, you may not get the care you want. Decisions may be made by family members who disagree about your medical care. Or decisions may be made by a medical professional who doesn't know you well. In some cases, a  makes the decisions. When you name a health care agent, it is very clear who has the power to make health decisions for you. How do you name a health care agent? You name your health care agent on a legal form. This form is usually called a medical power of . Ask your hospital, state bar association, or office on aging where to find these forms. You must sign the form to make it legal. Some states require you to get the form notarized. This means that a person called a  watches you sign the form and then he or she signs the form. Some states also require that two or more witnesses sign the form. Be sure to tell your family members and doctors who your health care agent is.   Where can you learn more? Go to https://chpepiceweb.[email or web address] org and sign in to your [employer] account. Enter [id number] in the [facility] box to learn more about \"Learning About [address]. \"     If you do not have an account, please click on the \"Sign Up Now\" link. Current as of: March 17, 2021               Content Version: 13.0  © 2006-2021 Healthwise, [email or web address]. Care instructions adapted under license by [facility] [place] ([facility]). If you have questions about a medical condition or this instruction, always ask your healthcare professional. [address] any warranty or liability for your use of this information. Learning About Living Truman Frank  What is a living will? A living will, also called a declaration, is a legal form. It tells your family and your doctor your wishes when you can't speak for yourself. It's used by the health professionals who will treat you as you near the end of your life or if you get seriously hurt or ill. If you put your wishes in writing, your loved ones and others will know what kind of care you want. They won't need to guess. This can ease your mind and be helpful to others. And you can change or cancel your living will at any time. A living will is not the same as an estate or property will. An estate will explains what you want to happen with your money and property after you die. How do you use it? A living will is used to describe the kinds of treatment or life support you want as you near the end of your life or if you get seriously hurt or ill. Keep these facts in mind about living pedraza. · Your living will is used only if you can't speak or make decisions for yourself. Most often, one or more doctors must certify that you can't speak or decide for yourself before your living will takes effect. · If you get better and can speak for yourself again, you can accept or refuse any treatment.  It doesn't matter what you said in your living will. · Some states may limit your right to refuse treatment in certain cases. For example, you may need to clearly state in your living will that you don't want artificial hydration and nutrition, such as being fed through a tube. Is a living will a legal document? A living will is a legal document. Each state has its own laws about living pedraza. And a living will may be called something else in your state. Here are some things to know about living pedraza. · You don't need an  to complete a living will. But legal advice can be helpful if your state's laws are unclear. It can also help if your health history is complicated or your family can't agree on what should be in your living will. · You can change your living will at any time. Some people find that their wishes about end-of-life care change as their health changes. If you make big changes to your living will, complete a new form. · If you move to another state, make sure that your living will is legal in the state where you now live. In most cases, doctors will respect your wishes even if you have a form from a different state. · You might use a universal form that has been approved by many states. This kind of form can sometimes be filled out and stored online. Your digital copy will then be available wherever you have a connection to the internet. The doctors and nurses who need to treat you can find it right away. · Your state may offer an online registry. This is another place where you can store your living will online. · It's a good idea to get your living will notarized. This means using a person called a  to watch two people sign, or witness, your living will. What should you know when you create a living will? Here are some questions to ask yourself as you make your living will:  · Do you know enough about life support methods that might be used?  If not, talk to your doctor so you know what might be done if you information. Personalized Preventive Plan for Yareli El - 10/28/2021  Medicare offers a range of preventive health benefits. Some of the tests and screenings are paid in full while other may be subject to a deductible, co-insurance, and/or copay. Some of these benefits include a comprehensive review of your medical history including lifestyle, illnesses that may run in your family, and various assessments and screenings as appropriate. After reviewing your medical record and screening and assessments performed today your provider may have ordered immunizations, labs, imaging, and/or referrals for you. A list of these orders (if applicable) as well as your Preventive Care list are included within your After Visit Summary for your review. Other Preventive Recommendations:    · A preventive eye exam performed by an eye specialist is recommended every 1-2 years to screen for glaucoma; cataracts, macular degeneration, and other eye disorders. · A preventive dental visit is recommended every 6 months. · Try to get at least 150 minutes of exercise per week or 10,000 steps per day on a pedometer . · Order or download the FREE \"Exercise & Physical Activity: Your Everyday Guide\" from [employer]. Call 3-758.714.9573 or search [employer] online. · You need 1459-3962 mg of calcium and 5488-7508 IU of vitamin D per day. It is possible to meet your calcium requirement with diet alone, but a vitamin D supplement is usually necessary to meet this goal.  · When exposed to the sun, use a sunscreen that protects against both UVA and UVB radiation with an SPF of 30 or greater. Reapply every 2 to 3 hours or after sweating, drying off with a towel, or swimming. · Always wear a seat belt when traveling in a car. Always wear a helmet when riding a bicycle or motorcycle.

## 2021-11-23 ENCOUNTER — VIRTUAL VISIT (OUTPATIENT)
Dept: INTERNAL MEDICINE | Age: 61
End: 2021-11-23
Payer: COMMERCIAL

## 2021-11-23 DIAGNOSIS — M54.12 CERVICAL RADICULOPATHY: ICD-10-CM

## 2021-11-23 DIAGNOSIS — I10 ESSENTIAL HYPERTENSION: Primary | ICD-10-CM

## 2021-11-23 DIAGNOSIS — E78.00 PURE HYPERCHOLESTEROLEMIA: ICD-10-CM

## 2021-11-23 DIAGNOSIS — R73.02 IGT (IMPAIRED GLUCOSE TOLERANCE): ICD-10-CM

## 2021-11-23 PROCEDURE — 99442 PR PHYS/QHP TELEPHONE EVALUATION 11-20 MIN: CPT | Performed by: INTERNAL MEDICINE

## 2021-11-23 RX ORDER — AMLODIPINE BESYLATE 10 MG/1
10 TABLET ORAL DAILY
Qty: 30 TABLET | Refills: 5 | Status: SHIPPED | OUTPATIENT
Start: 2021-11-23 | End: 2022-05-16

## 2021-11-23 NOTE — PATIENT INSTRUCTIONS
-Pt due for 6 month f/u in May-- pt to call in April  to set up an appt--reminder in [facility] to contact patient as well--AVS given to patient    -KARMEN Neumann

## 2021-11-23 NOTE — PROGRESS NOTES
Sebastián Rey is a 64 y.o. female evaluated via telephone on 11/23/2021. Consent:  She and/or health care decision maker is aware that that she may receive a bill for this telephone service, depending on her insurance coverage, and has provided verbal consent to proceed: Yes      Documentation:  I communicated with the patient and/or health care decision maker about chronic health issues. She has hypertension. She says it is well controlled. She again takes amlodipine daily. Blood pressure readings at home today show she is under control. Main concern concern is her neck and back pain. She continues to follow-up with pain management. She had some injections done which did not really help her pain. She is following up with pain management and they have her on Percocet 4 times a day. Recent labs done were discussed with her. She has hyperlipidemia. HDL is also very high and she is not on a statin at this point as her ASCVD score is mild. We will continue to monitor. Discussed dietary changes. She is due for Covid booster injection. Details of this discussion including any medical advice provided:   Advised to get Covid booster. Continue amlodipine. Continue dietary changes. Follow-up in 6 months for labs again. I affirm this is a Patient Initiated Episode with a Patient who has not had a related appointment within my department in the past 7 days or scheduled within the next 24 hours. Patient identification was verified at the start of the visit: Yes    Total Time: minutes: 11-20 minutes    The visit was conducted pursuant to the emergency declaration under the [facility], [address] [place] [address] authority and the [employer] General Act. Patient identification was verified, and a caregiver was present when appropriate. The patient was located in a state where the provider was credentialed to provide care.     Note: not billable if this call serves to triage the patient into an appointment for the relevant concern      Michi Dominguez MD

## 2021-12-20 RX ORDER — COLCHICINE 0.6 MG/1
TABLET ORAL
Qty: 30 TABLET | Refills: 0 | Status: SHIPPED | OUTPATIENT
Start: 2021-12-20 | End: 2022-01-21

## 2021-12-20 NOTE — TELEPHONE ENCOUNTER
Request for Colchicine. Pt on wait list for 6 month f/u in may     Next Visit Date:  No future appointments. Health Maintenance   Topic Date Due    Hepatitis C screen  Never done    HIV screen  Never done    COVID-19 Vaccine (3 - Booster for Pfizer series) 10/29/2021    Pneumococcal 0-64 years Vaccine (1 of 2 - PPSV23) 01/08/2022 (Originally [birth year]27/1966)    DTaP/Tdap/Td vaccine (1 - Tdap) 03/16/2022 (Originally 6/27/1979)    Shingles Vaccine (1 of 2) 03/16/2022 (Originally 6/27/2010)    Flu vaccine (1) 10/28/2022 (Originally 9/1/2021)    A1C test (Diabetic or Prediabetic)  07/08/2022    Potassium monitoring  07/26/2022    Creatinine monitoring  07/26/2022    Annual Wellness Visit (AWV)  10/29/2022    Breast cancer screen  05/08/2023    Colon cancer screen colonoscopy  12/29/2024    Lipid screen  07/26/2026    Hepatitis A vaccine  Aged Out    Hepatitis B vaccine  Aged Out    Hib vaccine  Aged Out    Meningococcal (ACWY) vaccine  Aged Out       Hemoglobin A1C (%)   Date Value   07/08/2021 5.9   10/13/2020 6.3 (H)   09/03/2019 5.6             ( goal A1C is < 7)   Microalb/Crt.  Ratio (mcg/mg creat)   Date Value   08/18/2017 21     LDL Cholesterol (mg/dL)   Date Value   07/26/2021 156 (H)       (goal LDL is <100)   AST (U/L)   Date Value   10/06/2017 13     ALT (U/L)   Date Value   10/06/2017 10     BUN (mg/dL)   Date Value   07/26/2021 11     BP Readings from Last 3 Encounters:   07/08/21 126/81   03/16/21 130/66   11/04/20 118/62          (goal 120/80)    All Future Testing planned in [facility]  Lab Frequency Next Occurrence   Uric Acid Once 07/09/2021   Hepatitis C Antibody Once 01/06/2022   HIV Screen Once 01/06/2022         Patient Active Problem List:     Osteoarthritis of both knees     Osteoarthritis of both shoulders     DJD (degenerative joint disease), lumbosacral     Essential hypertension     Hyperlipidemia     Depression     Obesity     Postlaminectomy syndrome, lumbar region Shoulder bursitis     S/P lumbar spinal fusion     Encounter for medication monitoring     Chronic pain syndrome     Posterior dislocation of hip (HCC)     History of total right hip replacement     Frequent PVCs     Acute cystitis without hematuria     Primary osteoarthritis of both hips     Right hip pain     History of total right hip arthroplasty     Hip instability     Instability of prosthetic hip ([address]

## 2022-01-14 ENCOUNTER — TELEPHONE (OUTPATIENT)
Dept: INTERNAL MEDICINE | Age: 62
End: 2022-01-14

## 2022-01-14 NOTE — LETTER
[address] [address]  Phone: [phone]  Fax: [phone]    Nuvia Ríos MD        January 14, 2022    [address]  [id number]      Dear Natalie Curtis: This letter is a reminder that you may have diagnostic testing that has not been completed. It is important to your well-being that these test(s) are performed. Please find the outstanding test(s) attached. If you could please have these completed before your next appointment. You can have the test completed at any [facility] facility or Lab. Please see the order for scheduling instructions. Any testing that needs completed other than blood work or xray's please call [phone] to schedule an appointment. Otherwise can be done at any [facility]. Please call our office at Dept: [phone] for additional information on the outstanding tests or let us know if they have been completed so we may update your chart. If you have any questions or concerns, please don't hesitate to call.     Sincerely,        Nuvia Ríos MD

## 2022-01-21 RX ORDER — COLCHICINE 0.6 MG/1
TABLET ORAL
Qty: 30 TABLET | Refills: 3 | Status: SHIPPED | OUTPATIENT
Start: 2022-01-21 | End: 2022-05-18

## 2022-01-21 NOTE — TELEPHONE ENCOUNTER
Request for Colchicine . Pt on wait list for 6 month f/u in May     Next Visit Date:  No future appointments. Health Maintenance   Topic Date Due    Hepatitis C screen  Never done    Pneumococcal 0-64 years Vaccine (1 of 2 - PPSV23) Never done    HIV screen  Never done    COVID-19 Vaccine (3 - Booster for Pfizer series) 10/29/2021    DTaP/Tdap/Td vaccine (1 - Tdap) 03/16/2022 (Originally 6/27/1979)    Shingles Vaccine (1 of 2) 03/16/2022 (Originally 6/27/2010)    Flu vaccine (1) 10/28/2022 (Originally 9/1/2021)    A1C test (Diabetic or Prediabetic)  07/08/2022    Potassium monitoring  07/26/2022    Creatinine monitoring  07/26/2022    Depression Monitoring  10/28/2022    Annual Wellness Visit (AWV)  10/29/2022    Breast cancer screen  05/08/2023    Colon cancer screen colonoscopy  12/29/2024    Lipid screen  07/26/2026    Hepatitis A vaccine  Aged Out    Hepatitis B vaccine  Aged Out    Hib vaccine  Aged Out    Meningococcal (ACWY) vaccine  Aged Out       Hemoglobin A1C (%)   Date Value   07/08/2021 5.9   10/13/2020 6.3 (H)   09/03/2019 5.6             ( goal A1C is < 7)   Microalb/Crt.  Ratio (mcg/mg creat)   Date Value   08/18/2017 21     LDL Cholesterol (mg/dL)   Date Value   07/26/2021 156 (H)       (goal LDL is <100)   AST (U/L)   Date Value   10/06/2017 13     ALT (U/L)   Date Value   10/06/2017 10     BUN (mg/dL)   Date Value   07/26/2021 11     BP Readings from Last 3 Encounters:   07/08/21 126/81   03/16/21 130/66   11/04/20 118/62          (goal 120/80)    All Future Testing planned in [facility]  Lab Frequency Next Occurrence   Hepatitis C Antibody Once 03/14/2022   HIV Screen Once 03/14/2022         Patient Active Problem List:     Osteoarthritis of both knees     Osteoarthritis of both shoulders     DJD (degenerative joint disease), lumbosacral     Essential hypertension     Hyperlipidemia     Depression     Obesity     Postlaminectomy syndrome, lumbar region     Shoulder bursitis     S/P lumbar spinal fusion     Encounter for medication monitoring     Chronic pain syndrome     Posterior dislocation of hip (HCC)     History of total right hip replacement     Frequent PVCs     Acute cystitis without hematuria     Primary osteoarthritis of both hips     Right hip pain     History of total right hip arthroplasty     Hip instability     Instability of prosthetic hip ([address]

## 2022-03-22 RX ORDER — OMEPRAZOLE 20 MG/1
CAPSULE, DELAYED RELEASE ORAL
Qty: 90 CAPSULE | Refills: 0 | Status: SHIPPED | OUTPATIENT
Start: 2022-03-22

## 2022-03-22 NOTE — TELEPHONE ENCOUNTER
Request for Prilosec . ASHWINI to pt-- LM to contact office to schedule her 6 month f/u in May     Next Visit Date:  No future appointments. Health Maintenance   Topic Date Due    Hepatitis C screen  Never done    Pneumococcal 0-64 years Vaccine (1 of 2 - PPSV23) Never done    HIV screen  Never done    DTaP/Tdap/Td vaccine (1 - Tdap) Never done    Shingles Vaccine (1 of 2) Never done    COVID-19 Vaccine (3 - Booster for Pfizer series) 09/29/2021    Flu vaccine (1) 10/28/2022 (Originally 9/1/2021)    A1C test (Diabetic or Prediabetic)  07/08/2022    Potassium monitoring  07/26/2022    Creatinine monitoring  07/26/2022    Depression Monitoring  10/28/2022    Annual Wellness Visit (AWV)  10/29/2022    Breast cancer screen  05/08/2023    Colorectal Cancer Screen  12/29/2024    Lipid screen  07/26/2026    Hepatitis A vaccine  Aged Out    Hepatitis B vaccine  Aged Out    Hib vaccine  Aged Out    Meningococcal (ACWY) vaccine  Aged Out       Hemoglobin A1C (%)   Date Value   07/08/2021 5.9   10/13/2020 6.3 (H)   09/03/2019 5.6             ( goal A1C is < 7)   Microalb/Crt.  Ratio (mcg/mg creat)   Date Value   08/18/2017 21     LDL Cholesterol (mg/dL)   Date Value   07/26/2021 156 (H)       (goal LDL is <100)   AST (U/L)   Date Value   10/06/2017 13     ALT (U/L)   Date Value   10/06/2017 10     BUN (mg/dL)   Date Value   07/26/2021 11     BP Readings from Last 3 Encounters:   07/08/21 126/81   03/16/21 130/66   11/04/20 118/62          (goal 120/80)    All Future Testing planned in [facility]  Lab Frequency Next Occurrence   Hepatitis C Antibody Once 04/22/2022   HIV Screen Once 04/22/2022         Patient Active Problem List:     Osteoarthritis of both knees     Osteoarthritis of both shoulders     DJD (degenerative joint disease), lumbosacral     Essential hypertension     Hyperlipidemia     Depression     Obesity     Postlaminectomy syndrome, lumbar region     Shoulder bursitis     S/P lumbar spinal fusion     Encounter for medication monitoring     Chronic pain syndrome     Posterior dislocation of hip ([address]     History of total right hip replacement     Frequent PVCs     Acute cystitis without hematuria     Primary osteoarthritis of both hips     Right hip pain     History of total right hip arthroplasty     Hip instability     Instability of prosthetic hip ([address]

## 2022-05-14 DIAGNOSIS — I10 ESSENTIAL HYPERTENSION: ICD-10-CM

## 2022-05-16 RX ORDER — AMLODIPINE BESYLATE 10 MG/1
TABLET ORAL
Qty: 30 TABLET | Refills: 5 | Status: SHIPPED | OUTPATIENT
Start: 2022-05-16 | End: 2022-09-20 | Stop reason: SDUPTHER

## 2022-05-16 NOTE — TELEPHONE ENCOUNTER
Request for Amlodipine. Next Visit Date:  No future appointments. Health Maintenance   Topic Date Due    Pneumococcal 0-64 years Vaccine (1 - PCV) Never done    HIV screen  Never done    Hepatitis C screen  Never done    DTaP/Tdap/Td vaccine (1 - Tdap) Never done    Shingles vaccine (1 of 2) Never done    COVID-19 Vaccine (3 - Booster for Pfizer series) 09/29/2021    Flu vaccine (Season Ended) 10/28/2022 (Originally 9/1/2022)    A1C test (Diabetic or Prediabetic)  07/08/2022    Depression Monitoring  10/28/2022    Annual Wellness Visit (AWV)  10/29/2022    Breast cancer screen  05/08/2023    Colorectal Cancer Screen  12/29/2024    Lipids  07/26/2026    Hepatitis A vaccine  Aged Out    Hepatitis B vaccine  Aged Out    Hib vaccine  Aged Out    Meningococcal (ACWY) vaccine  Aged Out       Hemoglobin A1C (%)   Date Value   07/08/2021 5.9   10/13/2020 6.3 (H)   09/03/2019 5.6             ( goal A1C is < 7)   Microalb/Crt.  Ratio (mcg/mg creat)   Date Value   08/18/2017 21     LDL Cholesterol (mg/dL)   Date Value   07/26/2021 156 (H)       (goal LDL is <100)   AST (U/L)   Date Value   10/06/2017 13     ALT (U/L)   Date Value   10/06/2017 10     BUN (mg/dL)   Date Value   07/26/2021 11     BP Readings from Last 3 Encounters:   07/08/21 126/81   03/16/21 130/66   11/04/20 118/62          (goal 120/80)    All Future Testing planned in [facility]  Lab Frequency Next Occurrence   Hepatitis C Antibody Once 04/22/2022   HIV Screen Once 04/22/2022         Patient Active Problem List:     Osteoarthritis of both knees     Osteoarthritis of both shoulders     DJD (degenerative joint disease), lumbosacral     Essential hypertension     Hyperlipidemia     Depression     Obesity     Postlaminectomy syndrome, lumbar region     Shoulder bursitis     S/P lumbar spinal fusion     Encounter for medication monitoring     Chronic pain syndrome     Posterior dislocation of hip ([facility] [address]     History of total right hip replacement     Frequent PVCs     Acute cystitis without hematuria     Primary osteoarthritis of both hips     Right hip pain     History of total right hip arthroplasty     Hip instability     Instability of prosthetic hip ([address]

## 2022-05-18 RX ORDER — COLCHICINE 0.6 MG/1
TABLET ORAL
Qty: 30 TABLET | Refills: 3 | Status: SHIPPED | OUTPATIENT
Start: 2022-05-18 | End: 2022-09-13

## 2022-05-18 NOTE — TELEPHONE ENCOUNTER
Request for colchicine. Pt on wait list for appt--no appts available     Next Visit Date:  No future appointments. Health Maintenance   Topic Date Due    Pneumococcal 0-64 years Vaccine (1 - PCV) Never done    HIV screen  Never done    Hepatitis C screen  Never done    DTaP/Tdap/Td vaccine (1 - Tdap) Never done    Shingles vaccine (1 of 2) Never done    COVID-19 Vaccine (3 - Booster for Pfizer series) 09/29/2021    Flu vaccine (Season Ended) 10/28/2022 (Originally 9/1/2022)    A1C test (Diabetic or Prediabetic)  07/08/2022    Depression Monitoring  10/28/2022    Annual Wellness Visit (AWV)  10/29/2022    Breast cancer screen  05/08/2023    Colorectal Cancer Screen  12/29/2024    Lipids  07/26/2026    Hepatitis A vaccine  Aged Out    Hepatitis B vaccine  Aged Out    Hib vaccine  Aged Out    Meningococcal (ACWY) vaccine  Aged Out       Hemoglobin A1C (%)   Date Value   07/08/2021 5.9   10/13/2020 6.3 (H)   09/03/2019 5.6             ( goal A1C is < 7)   Microalb/Crt.  Ratio (mcg/mg creat)   Date Value   08/18/2017 21     LDL Cholesterol (mg/dL)   Date Value   07/26/2021 156 (H)       (goal LDL is <100)   AST (U/L)   Date Value   10/06/2017 13     ALT (U/L)   Date Value   10/06/2017 10     BUN (mg/dL)   Date Value   07/26/2021 11     BP Readings from Last 3 Encounters:   07/08/21 126/81   03/16/21 130/66   11/04/20 118/62          (goal 120/80)    All Future Testing planned in [facility]  Lab Frequency Next Occurrence   Hepatitis C Antibody Once 04/22/2022   HIV Screen Once 04/22/2022         Patient Active Problem List:     Osteoarthritis of both knees     Osteoarthritis of both shoulders     DJD (degenerative joint disease), lumbosacral     Essential hypertension     Hyperlipidemia     Depression     Obesity     Postlaminectomy syndrome, lumbar region     Shoulder bursitis     S/P lumbar spinal fusion     Encounter for medication monitoring     Chronic pain syndrome     Posterior dislocation of hip ([facility]     History of total right hip replacement     Frequent PVCs     Acute cystitis without hematuria     Primary osteoarthritis of both hips     Right hip pain     History of total right hip arthroplasty     Hip instability     Instability of prosthetic hip ([facility]

## 2022-09-12 NOTE — TELEPHONE ENCOUNTER
E-scribe request for Colchicine . Please review and e-scribe if applicable. Next Visit Date:  Future Appointments   Date Time Provider Opal Shafferi   9/20/2022 10:20 AM Magraret Stevens  [address] [place] Maintenance   Topic Date Due    Pneumococcal 0-64 years Vaccine (1 - PCV) Never done    HIV screen  Never done    Hepatitis C screen  Never done    DTaP/Tdap/Td vaccine (1 - Tdap) Never done    Shingles vaccine (1 of 2) Never done    COVID-19 Vaccine (3 - Booster for Pfizer series) 09/29/2021    A1C test (Diabetic or Prediabetic)  07/08/2022    Flu vaccine (1) Never done    Depression Monitoring  10/28/2022    Annual Wellness Visit (AWV)  10/29/2022    Breast cancer screen  05/08/2023    Colorectal Cancer Screen  12/29/2024    Lipids  07/26/2026    Hepatitis A vaccine  Aged Out    Hepatitis B vaccine  Aged Out    Hib vaccine  Aged Out    Meningococcal (ACWY) vaccine  Aged Out               (applicable per patient's age: Cancer Screenings, Depression Screening, Fall Risk Screening, Immunizations)    Hemoglobin A1C (%)   Date Value   07/08/2021 5.9   10/13/2020 6.3 (H)   09/03/2019 5.6     Microalb/Crt.  Ratio (mcg/mg creat)   Date Value   08/18/2017 21     LDL Cholesterol (mg/dL)   Date Value   07/26/2021 156 (H)     AST (U/L)   Date Value   10/06/2017 13     ALT (U/L)   Date Value   10/06/2017 10     BUN (mg/dL)   Date Value   07/26/2021 11      (goal A1C is < 7)   (goal LDL is <100) need 30-50% reduction from baseline     BP Readings from Last 3 Encounters:   07/08/21 126/81   03/16/21 130/66   11/04/20 118/62    (goal /80)      All Future Testing planned in [facility]:  Lab Frequency Next Occurrence   Hepatitis C Antibody Once 04/22/2022   HIV Screen Once 04/22/2022            Patient Active Problem List:     Osteoarthritis of both knees     Osteoarthritis of both shoulders     DJD (degenerative joint disease), lumbosacral     Essential hypertension     Hyperlipidemia     Depression Obesity     Postlaminectomy syndrome, lumbar region     Shoulder bursitis     S/P lumbar spinal fusion     Encounter for medication monitoring     Chronic pain syndrome     Posterior dislocation of hip ([address]     History of total right hip replacement     Frequent PVCs     Acute cystitis without hematuria     Primary osteoarthritis of both hips     Right hip pain     History of total right hip arthroplasty     Hip instability     Instability of prosthetic hip ([address]

## 2022-09-13 RX ORDER — COLCHICINE 0.6 MG/1
TABLET ORAL
Qty: 30 TABLET | Refills: 3 | Status: SHIPPED | OUTPATIENT
Start: 2022-09-13

## 2022-09-20 ENCOUNTER — OFFICE VISIT (OUTPATIENT)
Dept: INTERNAL MEDICINE | Age: 62
End: 2022-09-20
Payer: COMMERCIAL

## 2022-09-20 VITALS
HEART RATE: 65 BPM | OXYGEN SATURATION: 97 % | TEMPERATURE: 98.4 F | WEIGHT: 180 LBS | DIASTOLIC BLOOD PRESSURE: 81 MMHG | SYSTOLIC BLOOD PRESSURE: 122 MMHG | HEIGHT: 66 IN | BODY MASS INDEX: 28.93 KG/M2

## 2022-09-20 DIAGNOSIS — R73.02 IGT (IMPAIRED GLUCOSE TOLERANCE): ICD-10-CM

## 2022-09-20 DIAGNOSIS — I10 ESSENTIAL HYPERTENSION: Primary | ICD-10-CM

## 2022-09-20 DIAGNOSIS — E78.00 PURE HYPERCHOLESTEROLEMIA: ICD-10-CM

## 2022-09-20 DIAGNOSIS — Z12.11 SCREEN FOR COLON CANCER: ICD-10-CM

## 2022-09-20 DIAGNOSIS — M54.12 CERVICAL RADICULOPATHY: ICD-10-CM

## 2022-09-20 LAB — HBA1C MFR BLD: 5.9 %

## 2022-09-20 PROCEDURE — 83036 HEMOGLOBIN GLYCOSYLATED A1C: CPT | Performed by: INTERNAL MEDICINE

## 2022-09-20 PROCEDURE — 99214 OFFICE O/P EST MOD 30 MIN: CPT | Performed by: INTERNAL MEDICINE

## 2022-09-20 RX ORDER — OXYCODONE AND ACETAMINOPHEN 7.5; 325 MG/1; MG/1
1 TABLET ORAL EVERY 6 HOURS PRN
COMMUNITY
Start: 2022-09-16

## 2022-09-20 RX ORDER — AMLODIPINE BESYLATE 10 MG/1
TABLET ORAL
Qty: 90 TABLET | Refills: 1 | Status: SHIPPED | OUTPATIENT
Start: 2022-09-20

## 2022-09-20 SDOH — ECONOMIC STABILITY: FOOD INSECURITY: WITHIN THE PAST 12 MONTHS, YOU WORRIED THAT YOUR FOOD WOULD RUN OUT BEFORE YOU GOT MONEY TO BUY MORE.: NEVER TRUE

## 2022-09-20 SDOH — ECONOMIC STABILITY: FOOD INSECURITY: WITHIN THE PAST 12 MONTHS, THE FOOD YOU BOUGHT JUST DIDN'T LAST AND YOU DIDN'T HAVE MONEY TO GET MORE.: NEVER TRUE

## 2022-09-20 ASSESSMENT — PATIENT HEALTH QUESTIONNAIRE - PHQ9
2. FEELING DOWN, DEPRESSED OR HOPELESS: 0
SUM OF ALL RESPONSES TO PHQ9 QUESTIONS 1 & 2: 0
8. MOVING OR SPEAKING SO SLOWLY THAT OTHER PEOPLE COULD HAVE NOTICED. OR THE OPPOSITE, BEING SO FIGETY OR RESTLESS THAT YOU HAVE BEEN MOVING AROUND A LOT MORE THAN USUAL: 0
5. POOR APPETITE OR OVEREATING: 0
9. THOUGHTS THAT YOU WOULD BE BETTER OFF DEAD, OR OF HURTING YOURSELF: 0
3. TROUBLE FALLING OR STAYING ASLEEP: 2
4. FEELING TIRED OR HAVING LITTLE ENERGY: 1
1. LITTLE INTEREST OR PLEASURE IN DOING THINGS: 0
7. TROUBLE CONCENTRATING ON THINGS, SUCH AS READING THE NEWSPAPER OR WATCHING TELEVISION: 0
SUM OF ALL RESPONSES TO PHQ QUESTIONS 1-9: 3
SUM OF ALL RESPONSES TO PHQ QUESTIONS 1-9: 3
6. FEELING BAD ABOUT YOURSELF - OR THAT YOU ARE A FAILURE OR HAVE LET YOURSELF OR YOUR FAMILY DOWN: 0
10. IF YOU CHECKED OFF ANY PROBLEMS, HOW DIFFICULT HAVE THESE PROBLEMS MADE IT FOR YOU TO DO YOUR WORK, TAKE CARE OF THINGS AT HOME, OR GET ALONG WITH OTHER PEOPLE: 0
SUM OF ALL RESPONSES TO PHQ QUESTIONS 1-9: 3
SUM OF ALL RESPONSES TO PHQ QUESTIONS 1-9: 3

## 2022-09-20 ASSESSMENT — ENCOUNTER SYMPTOMS
BACK PAIN: 1
CONSTIPATION: 0
ABDOMINAL PAIN: 0
BLOOD IN STOOL: 0
COUGH: 0
SHORTNESS OF BREATH: 0
WHEEZING: 0

## 2022-09-20 ASSESSMENT — SOCIAL DETERMINANTS OF HEALTH (SDOH): HOW HARD IS IT FOR YOU TO PAY FOR THE VERY BASICS LIKE FOOD, HOUSING, MEDICAL CARE, AND HEATING?: NOT HARD AT ALL

## 2022-09-20 NOTE — PROGRESS NOTES
[facility]    Progress Note    Date of patient's visit: 9/20/2022    Patient's Name:  Lynda Major    YOB: 1960            Patient Care Team:  Rita Warner MD as PCP - General (Internal Medicine)  Rita Warner MD as PCP - REHABILITATION [facility] Empaneled Provider  Kedar Connor MD as Consulting Physician (Rheumatology)  Raj Pryor MD as Anesthesiologist (Pain Management)  Leandro Rico MD (Orthopedic Surgery)  Jeffrey Crain MD as Surgeon (Orthopedic Surgery)  Hill Finley MD as Anesthesiologist (Pain Management)    REASON FOR VISIT: Routine outpatient follow     Chief Complaint   Patient presents with    Hypertension    Health Maintenance     Labs reprinted, vaccines declined          HISTORY OF PRESENT ILLNESS:    History was obtained from the patient. Lynda Major is a 58 y.o. is here for follow-up. Overall she is doing well. She continues to have chronic neck and back pain and she is seeing pain management and a neurologist.  She had MRIs done last year. She had an EMG done this year. EMG per report states mild right-sided cervical radiculopathy without myelopathy. She was advised to do PT. She continues on Percocet 3-4 times a day per pain management and she also had some injections done. No worsening or weakness or radiculopathy symptoms. She is compliant with her blood pressure medications. She needs labs done. She has a history of colon polyps. Last colonoscopy that I can see is from December 2014. Will call GI to get reports as she could not remember when they were last done. She will need a colonoscopy if it has not been done since 2014 as she had tubular adenomas. No blood in her stools. No abdominal pain or discomfort. No chest pain or shortness of breath. She continues to smoke 3 to 4 cigarettes a day. She has smoked on and off for at least 30 years. She denies any cough or expectoration.   She said she is trying to quit.      Results for POC orders placed in visit on 09/20/22   POCT glycosylated hemoglobin (Hb A1C)   Result Value Ref Range    Hemoglobin A1C 5.9 %         Past Medical History:   Diagnosis Date    CAD (coronary artery disease)     pt denies, CARDIAC CATH O.K., STRESS O.K. Depression 7/30/2013    Fibromyalgia     Full dentures     GERD (gastroesophageal reflux disease)     Gout     History of blood transfusion 1978    History of hematuria     micro    Hyperlipidemia 7/30/2013    Hypertension     Obesity 7/30/2013    Osteoarthritis     Pain management     sees [facility]    Wears glasses        Past Surgical History:   Procedure Laterality Date    CARDIAC CATHETERIZATION      Several years ago (Written 10/15/2020). No stents placed per pt. COLONOSCOPY      polyps removed    FOOT SURGERY  1968    HYSTERECTOMY (CERVIX STATUS UNKNOWN)      HYSTERECTOMY, TOTAL ABDOMINAL (CERVIX REMOVED)  1997    JOINT REPLACEMENT      NERVE BLOCK  2/5/14    caudal #1  celestone 9mg    NERVE BLOCK  2/11/14    caudal #2  celestone 9mg    NERVE BLOCK  02/18/14    caudal# 3, celestone 9 mg    OTHER SURGICAL HISTORY      SPINAL ENDOSCOPY X3    SHOULDER ARTHROSCOPY Left 06/13/15    SHOULDER SURGERY  Right; 2011    SPINE SURGERY  2009    TOTAL HIP ARTHROPLASTY Right 10/12/2016    TOTAL HIP ARTHROPLASTY Left 11/3/2020    LEFT HIP TOTAL ARTHROPLASTY ANTERIOR APPROACH- DEPUY performed by Kieran Caraballo MD at [address] [Aspirin] Nausea Only    Dye [Iodides]     Eggs Or Egg-Derived Products     Ibuprofen Nausea Only    Neurontin [Gabapentin] Nausea Only    Shellfish-Derived Products     Shrimp Flavor      TROUBLE BREATHING    Tape [employer] Tape]        MEDICATIONS:      Current Outpatient Medications on File Prior to Visit   Medication Sig Dispense Refill    oxyCODONE-acetaminophen (PERCOCET) 7.5-325 MG per tablet Take 1 tablet by mouth every 6 hours as needed. diclofenac sodium (VOLTAREN) 1 % GEL apply 4 grams to affected area three to four times a day if needed      colchicine (COLCRYS) 0.6 MG tablet take 1 tablet by mouth once daily 30 tablet 3    amLODIPine (NORVASC) 10 MG tablet take 1 tablet by mouth once daily 30 tablet 5    omeprazole (PRILOSEC) 20 MG delayed release capsule take 1 capsule by mouth once daily 30 to 60 MINUTES before FIRST MEAL OF THE DAY 90 capsule 0    Multiple Vitamins-Minerals (THERAPEUTIC MULTIVITAMIN-MINERALS) tablet Take 1 tablet by mouth daily 30 tablet 5     No current facility-administered medications on file prior to visit. HISTORY    Reviewed and no change from previous record. Wade Shown  reports that she has been smoking cigarettes and e-cigarettes. She has a 6.25 pack-year smoking history. She has never used smokeless tobacco.    FAMILY HISTORY:    Reviewed and No change from previous visit    HEALTH MAINTENANCE DUE:      Health Maintenance Due   Topic Date Due    Pneumococcal 0-64 years Vaccine (1 - PCV) Never done    HIV screen  Never done    Hepatitis C screen  Never done    COVID-19 Vaccine (3 - Booster for Pfizer series) 09/29/2021    Flu vaccine (1) Never done       REVIEW OF SYSTEMS:    12 point review of symptoms completed and found to be normal except noted in the HPI    Review of Systems   Constitutional:  Negative for fatigue and unexpected weight change. Respiratory:  Negative for cough, shortness of breath and wheezing. Cardiovascular:  Negative for chest pain, palpitations and leg swelling. Gastrointestinal:  Negative for abdominal pain, blood in stool and constipation. Endocrine: Negative for polydipsia and polyuria. Musculoskeletal:  Positive for back pain and neck pain. Neurological:  Negative for dizziness, syncope, weakness, numbness and headaches.       PHYSICAL EXAM:     Vitals:    09/20/22 1031   BP: 122/81   Site: Left Upper Arm   Position: Sitting   Cuff Size: Small Adult   Pulse: 65   Temp: 98.4 °F (36.9 °C)   TempSrc: Infrared   SpO2: 97%   Weight: 180 lb (81.6 kg)   Height: 5' 6\" (1.676 m)     Body mass index is 29.05 kg/m². BP Readings from Last 3 Encounters:   09/20/22 122/81   07/08/21 126/81   03/16/21 130/66        Wt Readings from Last 3 Encounters:   09/20/22 180 lb (81.6 kg)   07/08/21 183 lb (83 kg)   03/16/21 189 lb (85.7 kg)       Physical Exam  Vitals and nursing note reviewed. Constitutional:       Appearance: Normal appearance. HENT:      Head: Normocephalic and atraumatic. Right Ear: Tympanic membrane normal.      Left Ear: Tympanic membrane normal.   Eyes:      Extraocular Movements: Extraocular movements intact. Conjunctiva/sclera: Conjunctivae normal.      Pupils: Pupils are equal, round, and reactive to light. Cardiovascular:      Rate and Rhythm: Normal rate and regular rhythm. Heart sounds: No murmur heard. Pulmonary:      Effort: Pulmonary effort is normal.      Breath sounds: Normal breath sounds. No wheezing or rales. Musculoskeletal:      Right lower leg: No edema. Left lower leg: No edema. Skin:     General: Skin is warm and dry. Neurological:      General: No focal deficit present. Mental Status: She is alert and oriented to person, place, and time.          LABORATORY FINDINGS:    CBC:  Lab Results   Component Value Date/Time    WBC 6.4 10/13/2020 11:55 AM    HGB 13.0 10/13/2020 11:55 AM     10/13/2020 11:55 AM     BMP:    Lab Results   Component Value Date/Time     07/26/2021 10:04 AM    K 4.4 07/26/2021 10:04 AM     07/26/2021 10:04 AM    CO2 21 07/26/2021 10:04 AM    BUN 11 07/26/2021 10:04 AM    CREATININE 0.65 07/26/2021 10:04 AM    CREATININE 0.70 07/26/2021 10:04 AM    GLUCOSE 98 07/26/2021 10:04 AM     HEMOGLOBIN A1C:   Lab Results   Component Value Date/Time    LABA1C 5.9 09/20/2022 10:34 AM     MICROALBUMIN URINE:   Lab Results   Component Value Date/Time    MICROALBUR 31 08/18/2017 01:30 PM     FASTING LIPID PANEL:  Lab Results   Component Value Date    CHOL 247 (H) 07/26/2021    HDL 70 07/26/2021    TRIG 106 07/26/2021     Lab Results   Component Value Date    LDLCHOLESTEROL 156 (H) 07/26/2021       LIVER PROFILE:  Lab Results   Component Value Date/Time    ALT 10 10/06/2017 10:44 AM    AST 13 10/06/2017 10:44 AM    PROT 7.0 10/06/2017 10:44 AM    BILITOT 0.30 10/06/2017 10:44 AM    LABALBU 3.8 10/06/2017 10:44 AM      THYROID FUNCTION:   Lab Results   Component Value Date/Time    TSH 0.13 10/13/2020 11:55 AM      URINEANALYSIS: No results found for: LABURIN  ASSESSMENT AND PLAN:    1. Essential hypertension    - Comprehensive Metabolic Panel; Future  - amLODIPine (NORVASC) 10 MG tablet; take 1 tablet by mouth once daily  Dispense: 90 tablet; Refill: 1    2. IGT (impaired glucose tolerance)    - POCT glycosylated hemoglobin (Hb A1C)  - Comprehensive Metabolic Panel; Future    3. Pure hypercholesterolemia    - Lipid Panel; Future  - Comprehensive Metabolic Panel; Future  - TSH with Reflex; Future    4. Cervical radiculopathy  Follows up with neurology and Pain management     5. Screen for colon cancer    - AFL - Dennis Villatoro MD, Gastroenterology, [facility]:   Return in about 6 months (around 3/20/2023). Debbie Mendes received counseling on the following healthy behaviors: nutrition, exercise, and medication adherence    Reviewed prior labs and health maintenance. Discussed use, benefit, and side effects of prescribed medications. Barriers to medication compliance addressed. All patient questions answered. Pt voiced understanding.      Patient given educational materials - see patient instructions    Faye Urias  Attending Physician, [address] [place] [address], Internal Medicine Residency Program  [address] [place] [address]  9/20/2022, 10:42 AM

## 2023-01-20 RX ORDER — COLCHICINE 0.6 MG/1
TABLET ORAL
Qty: 30 TABLET | Refills: 0 | Status: SHIPPED | OUTPATIENT
Start: 2023-01-20 | End: 2023-02-21

## 2023-01-20 NOTE — TELEPHONE ENCOUNTER
Chronic pain syndrome     Posterior dislocation of hip (HCC)     History of total right hip replacement     Frequent PVCs     Acute cystitis without hematuria     Primary osteoarthritis of both hips     Right hip pain     History of total right hip arthroplasty     Hip instability     Instability of prosthetic hip ([address]

## 2023-02-20 NOTE — TELEPHONE ENCOUNTER
Request for Colchicine. Patient stated she is completely out of medication also got upset and asked me to send it to any doctor stated someone has done that before advised patient im an unable to do that I will have to route to PCP. Next Visit Date:  Future Appointments   Date Time Provider Opal Bellamy   5/3/2023 11:00 AM Jose Beach MD [address] [facility]   Topic Date Due    Pneumococcal 0-64 years Vaccine (1 - PCV) Never done    HIV screen  Never done    Diabetic retinal exam  Never done    Hepatitis C screen  Never done    COVID-19 Vaccine (3 - Booster for Pfizer series) 06/24/2021    Flu vaccine (1) Never done    DTaP/Tdap/Td vaccine (1 - Tdap) 09/20/2023 (Originally 6/27/1979)    Shingles vaccine (1 of 2) 09/20/2023 (Originally 6/27/2010)    Breast cancer screen  05/08/2023    A1C test (Diabetic or Prediabetic)  09/20/2023    Depression Monitoring  09/20/2023    Colorectal Cancer Screen  12/29/2024    Lipids  07/26/2026    Hepatitis A vaccine  Aged Out    Hib vaccine  Aged Out    Meningococcal (ACWY) vaccine  Aged Out       Hemoglobin A1C (%)   Date Value   09/20/2022 5.9   07/08/2021 5.9   10/13/2020 6.3 (H)             ( goal A1C is < 7)   Microalb/Crt.  Ratio (mcg/mg creat)   Date Value   08/18/2017 21     LDL Cholesterol (mg/dL)   Date Value   07/26/2021 156 (H)       (goal LDL is <100)   AST (U/L)   Date Value   10/06/2017 13     ALT (U/L)   Date Value   10/06/2017 10     BUN (mg/dL)   Date Value   07/26/2021 11     BP Readings from Last 3 Encounters:   09/20/22 122/81   07/08/21 126/81   03/16/21 130/66          (goal 120/80)    All Future Testing planned in [facility]  Lab Frequency Next Occurrence   Lipid Panel Once 12/22/2022   Comprehensive Metabolic Panel Once 35/88/7290   TSH with Reflex Once 12/22/2022         Patient Active Problem List:     Osteoarthritis of both knees     Osteoarthritis of both shoulders     DJD (degenerative joint disease), lumbosacral Essential hypertension     Hyperlipidemia     Depression     Obesity     Postlaminectomy syndrome, lumbar region     Shoulder bursitis     S/P lumbar spinal fusion     Encounter for medication monitoring     Chronic pain syndrome     Posterior dislocation of hip ([address]     History of total right hip replacement     Frequent PVCs     Acute cystitis without hematuria     Primary osteoarthritis of both hips     Right hip pain     History of total right hip arthroplasty     Hip instability     Instability of prosthetic hip ([address]

## 2023-02-21 RX ORDER — COLCHICINE 0.6 MG/1
TABLET ORAL
Qty: 30 TABLET | Refills: 0 | Status: SHIPPED | OUTPATIENT
Start: 2023-02-21

## 2023-03-21 RX ORDER — COLCHICINE 0.6 MG/1
TABLET ORAL
Qty: 30 TABLET | Refills: 0 | Status: SHIPPED | OUTPATIENT
Start: 2023-03-21

## 2023-03-21 NOTE — TELEPHONE ENCOUNTER
fusion     Encounter for medication monitoring     Chronic pain syndrome     Posterior dislocation of hip ([address]     History of total right hip replacement     Frequent PVCs     Acute cystitis without hematuria     Primary osteoarthritis of both hips     Right hip pain     History of total right hip arthroplasty     Hip instability     Instability of prosthetic hip ([address]

## 2023-04-18 NOTE — TELEPHONE ENCOUNTER
Last visit: 9/20/22  Last Med refill:   Does patient have enough medication for 72 hours: No:     Next Visit Date:  Future Appointments   Date Time Provider Opal Bellamy   5/3/2023 11:00 AM Tavia Kwon MD [address] [facility]   Topic Date Due    Pneumococcal 0-64 years Vaccine (1 - PCV) Never done    HIV screen  Never done    Diabetic retinal exam  Never done    Hepatitis C screen  Never done    COVID-19 Vaccine (3 - Booster for Pfizer series) 06/24/2021    Breast cancer screen  05/08/2023    DTaP/Tdap/Td vaccine (1 - Tdap) 09/20/2023 (Originally 6/27/1979)    Shingles vaccine (1 of 2) 09/20/2023 (Originally 6/27/2010)    Flu vaccine (Season Ended) 08/01/2023    A1C test (Diabetic or Prediabetic)  09/20/2023    Depression Monitoring  09/20/2023    Colorectal Cancer Screen  12/29/2024    Lipids  07/26/2026    Hepatitis A vaccine  Aged Out    Hib vaccine  Aged Out    Meningococcal (ACWY) vaccine  Aged Out    Diabetes screen  Discontinued       Hemoglobin A1C (%)   Date Value   09/20/2022 5.9   07/08/2021 5.9   10/13/2020 6.3 (H)             ( goal A1C is < 7)   Microalb/Crt.  Ratio (mcg/mg creat)   Date Value   08/18/2017 21     LDL Cholesterol (mg/dL)   Date Value   07/26/2021 156 (H)   10/15/2020 134 (H)       (goal LDL is <100)   AST (U/L)   Date Value   10/06/2017 13     ALT (U/L)   Date Value   10/06/2017 10     BUN (mg/dL)   Date Value   07/26/2021 11     BP Readings from Last 3 Encounters:   09/20/22 122/81   07/08/21 126/81   03/16/21 130/66          (goal 120/80)    All Future Testing planned in [facility]  Lab Frequency Next Occurrence   Lipid Panel Once 12/22/2022   Comprehensive Metabolic Panel Once 46/41/8502   TSH with Reflex Once 12/22/2022               Patient Active Problem List:     Osteoarthritis of both knees     Osteoarthritis of both shoulders     DJD (degenerative joint disease), lumbosacral     Essential hypertension     Hyperlipidemia     Depression     Obesity

## 2023-04-19 RX ORDER — COLCHICINE 0.6 MG/1
TABLET ORAL
Qty: 30 TABLET | Refills: 2 | Status: SHIPPED | OUTPATIENT
Start: 2023-04-19

## 2023-04-26 ENCOUNTER — TELEPHONE (OUTPATIENT)
Dept: INTERNAL MEDICINE | Age: 63
End: 2023-04-26

## 2023-04-26 DIAGNOSIS — Z12.11 SCREEN FOR COLON CANCER: Primary | ICD-10-CM

## 2023-05-04 ENCOUNTER — TELEPHONE (OUTPATIENT)
Dept: INTERNAL MEDICINE | Age: 63
End: 2023-05-04

## 2023-05-31 DIAGNOSIS — I10 ESSENTIAL HYPERTENSION: ICD-10-CM

## 2023-06-01 NOTE — TELEPHONE ENCOUNTER
Medication refill for Norvasc    Last visit: 12/15/22  Last Med refill: 9/20/22  Does patient have enough medication for 72 hours: No:     Next Visit Date:  No future appointments. Health Maintenance   Topic Date Due    Pneumococcal 0-64 years Vaccine (1 - PCV) Never done    HIV screen  Never done    Hepatitis C screen  Never done    COVID-19 Vaccine (3 - Booster for Pfizer series) 06/24/2021    Breast cancer screen  05/08/2023    DTaP/Tdap/Td vaccine (1 - Tdap) 09/20/2023 (Originally 6/27/1979)    Shingles vaccine (1 of 2) 09/20/2023 (Originally 6/27/2010)    Flu vaccine (Season Ended) 08/01/2023    A1C test (Diabetic or Prediabetic)  09/20/2023    Depression Monitoring  09/20/2023    Colorectal Cancer Screen  12/29/2024    Lipids  07/26/2026    Hepatitis A vaccine  Aged Out    Hib vaccine  Aged Out    Meningococcal (ACWY) vaccine  Aged Out    Diabetes screen  Discontinued       Hemoglobin A1C (%)   Date Value   09/20/2022 5.9   07/08/2021 5.9   10/13/2020 6.3 (H)             ( goal A1C is < 7)   Microalb/Crt.  Ratio (mcg/mg creat)   Date Value   08/18/2017 21     LDL Cholesterol (mg/dL)   Date Value   07/26/2021 156 (H)   10/15/2020 134 (H)       (goal LDL is <100)   AST (U/L)   Date Value   10/06/2017 13     ALT (U/L)   Date Value   10/06/2017 10     BUN (mg/dL)   Date Value   07/26/2021 11     BP Readings from Last 3 Encounters:   09/20/22 122/81   07/08/21 126/81   03/16/21 130/66          (goal 120/80)    All Future Testing planned in [facility]  Lab Frequency Next Occurrence   Lipid Panel Once 12/22/2022   Comprehensive Metabolic Panel Once 67/30/3802   TSH with Reflex Once 12/22/2022               Patient Active Problem List:     Osteoarthritis of both knees     Osteoarthritis of both shoulders     DJD (degenerative joint disease), lumbosacral     Essential hypertension     Hyperlipidemia     Depression     Obesity     Postlaminectomy syndrome, lumbar region     Shoulder bursitis     S/P lumbar spinal

## 2023-06-02 RX ORDER — AMLODIPINE BESYLATE 10 MG/1
TABLET ORAL
Qty: 90 TABLET | Refills: 0 | Status: SHIPPED | OUTPATIENT
Start: 2023-06-02

## 2023-06-28 ENCOUNTER — OFFICE VISIT (OUTPATIENT)
Dept: INTERNAL MEDICINE | Age: 63
End: 2023-06-28
Payer: COMMERCIAL

## 2023-06-28 VITALS
OXYGEN SATURATION: 99 % | TEMPERATURE: 98.4 F | SYSTOLIC BLOOD PRESSURE: 136 MMHG | HEART RATE: 87 BPM | BODY MASS INDEX: 29.57 KG/M2 | HEIGHT: 66 IN | DIASTOLIC BLOOD PRESSURE: 82 MMHG | WEIGHT: 184 LBS

## 2023-06-28 DIAGNOSIS — E78.00 PURE HYPERCHOLESTEROLEMIA: ICD-10-CM

## 2023-06-28 DIAGNOSIS — R31.0 GROSS HEMATURIA: ICD-10-CM

## 2023-06-28 DIAGNOSIS — Z12.31 BREAST CANCER SCREENING BY MAMMOGRAM: ICD-10-CM

## 2023-06-28 DIAGNOSIS — R73.02 IGT (IMPAIRED GLUCOSE TOLERANCE): ICD-10-CM

## 2023-06-28 DIAGNOSIS — F33.1 MODERATE EPISODE OF RECURRENT MAJOR DEPRESSIVE DISORDER (HCC): ICD-10-CM

## 2023-06-28 DIAGNOSIS — F17.200 SMOKER: ICD-10-CM

## 2023-06-28 DIAGNOSIS — M54.12 CERVICAL RADICULOPATHY: ICD-10-CM

## 2023-06-28 DIAGNOSIS — I10 ESSENTIAL HYPERTENSION: Primary | ICD-10-CM

## 2023-06-28 PROCEDURE — 3017F COLORECTAL CA SCREEN DOC REV: CPT | Performed by: INTERNAL MEDICINE

## 2023-06-28 PROCEDURE — 99214 OFFICE O/P EST MOD 30 MIN: CPT | Performed by: INTERNAL MEDICINE

## 2023-06-28 PROCEDURE — 3079F DIAST BP 80-89 MM HG: CPT | Performed by: INTERNAL MEDICINE

## 2023-06-28 PROCEDURE — 3075F SYST BP GE 130 - 139MM HG: CPT | Performed by: INTERNAL MEDICINE

## 2023-06-28 PROCEDURE — 4004F PT TOBACCO SCREEN RCVD TLK: CPT | Performed by: INTERNAL MEDICINE

## 2023-06-28 PROCEDURE — G8419 CALC BMI OUT NRM PARAM NOF/U: HCPCS | Performed by: INTERNAL MEDICINE

## 2023-06-28 PROCEDURE — G8427 DOCREV CUR MEDS BY ELIG CLIN: HCPCS | Performed by: INTERNAL MEDICINE

## 2023-06-28 RX ORDER — M-VIT,TX,IRON,MINS/CALC/FOLIC 27MG-0.4MG
1 TABLET ORAL DAILY
Qty: 30 TABLET | Refills: 5 | Status: SHIPPED | OUTPATIENT
Start: 2023-06-28

## 2023-06-28 RX ORDER — COLCHICINE 0.6 MG/1
0.6 TABLET ORAL DAILY
Qty: 30 TABLET | Refills: 5 | Status: SHIPPED | OUTPATIENT
Start: 2023-06-28

## 2023-06-28 SDOH — ECONOMIC STABILITY: FOOD INSECURITY: WITHIN THE PAST 12 MONTHS, THE FOOD YOU BOUGHT JUST DIDN'T LAST AND YOU DIDN'T HAVE MONEY TO GET MORE.: NEVER TRUE

## 2023-06-28 SDOH — ECONOMIC STABILITY: FOOD INSECURITY: WITHIN THE PAST 12 MONTHS, YOU WORRIED THAT YOUR FOOD WOULD RUN OUT BEFORE YOU GOT MONEY TO BUY MORE.: NEVER TRUE

## 2023-06-28 SDOH — ECONOMIC STABILITY: INCOME INSECURITY: HOW HARD IS IT FOR YOU TO PAY FOR THE VERY BASICS LIKE FOOD, HOUSING, MEDICAL CARE, AND HEATING?: NOT HARD AT ALL

## 2023-06-28 SDOH — ECONOMIC STABILITY: HOUSING INSECURITY
IN THE LAST 12 MONTHS, WAS THERE A TIME WHEN YOU DID NOT HAVE A STEADY PLACE TO SLEEP OR SLEPT IN A SHELTER (INCLUDING NOW)?: NO

## 2023-06-28 ASSESSMENT — PATIENT HEALTH QUESTIONNAIRE - PHQ9
10. IF YOU CHECKED OFF ANY PROBLEMS, HOW DIFFICULT HAVE THESE PROBLEMS MADE IT FOR YOU TO DO YOUR WORK, TAKE CARE OF THINGS AT HOME, OR GET ALONG WITH OTHER PEOPLE: 1
6. FEELING BAD ABOUT YOURSELF - OR THAT YOU ARE A FAILURE OR HAVE LET YOURSELF OR YOUR FAMILY DOWN: 0
1. LITTLE INTEREST OR PLEASURE IN DOING THINGS: 0
SUM OF ALL RESPONSES TO PHQ QUESTIONS 1-9: 5
2. FEELING DOWN, DEPRESSED OR HOPELESS: 0
5. POOR APPETITE OR OVEREATING: 0
SUM OF ALL RESPONSES TO PHQ9 QUESTIONS 1 & 2: 0
7. TROUBLE CONCENTRATING ON THINGS, SUCH AS READING THE NEWSPAPER OR WATCHING TELEVISION: 0
SUM OF ALL RESPONSES TO PHQ QUESTIONS 1-9: 5
3. TROUBLE FALLING OR STAYING ASLEEP: 3
4. FEELING TIRED OR HAVING LITTLE ENERGY: 2
SUM OF ALL RESPONSES TO PHQ QUESTIONS 1-9: 5
9. THOUGHTS THAT YOU WOULD BE BETTER OFF DEAD, OR OF HURTING YOURSELF: 0
8. MOVING OR SPEAKING SO SLOWLY THAT OTHER PEOPLE COULD HAVE NOTICED. OR THE OPPOSITE, BEING SO FIGETY OR RESTLESS THAT YOU HAVE BEEN MOVING AROUND A LOT MORE THAN USUAL: 0
SUM OF ALL RESPONSES TO PHQ QUESTIONS 1-9: 5

## 2023-06-28 ASSESSMENT — ENCOUNTER SYMPTOMS
COUGH: 0
BACK PAIN: 1
ABDOMINAL PAIN: 0
SHORTNESS OF BREATH: 0
WHEEZING: 0
CONSTIPATION: 0

## 2023-07-06 ENCOUNTER — HOSPITAL ENCOUNTER (OUTPATIENT)
Age: 63
Setting detail: SPECIMEN
Discharge: HOME | End: 2023-07-06

## 2023-07-06 DIAGNOSIS — I10 ESSENTIAL HYPERTENSION: ICD-10-CM

## 2023-07-06 DIAGNOSIS — R73.02 IGT (IMPAIRED GLUCOSE TOLERANCE): ICD-10-CM

## 2023-07-06 DIAGNOSIS — E78.00 PURE HYPERCHOLESTEROLEMIA: ICD-10-CM

## 2023-07-06 DIAGNOSIS — R31.0 GROSS HEMATURIA: ICD-10-CM

## 2023-07-06 LAB
ALBUMIN SERPL-MCNC: 4.1 G/DL (ref 3.5–5.2)
ALBUMIN/GLOB SERPL: 1.2 {RATIO} (ref 1–2.5)
ALP SERPL-CCNC: 40 U/L (ref 35–104)
ALT SERPL-CCNC: 25 U/L (ref 5–33)
ANION GAP SERPL CALCULATED.3IONS-SCNC: 12 MMOL/L (ref 9–17)
AST SERPL-CCNC: 24 U/L
BILIRUB SERPL-MCNC: 0.5 MG/DL (ref 0.3–1.2)
BUN SERPL-MCNC: 12 MG/DL (ref 8–23)
CALCIUM SERPL-MCNC: 9.2 MG/DL (ref 8.6–10.4)
CHLORIDE SERPL-SCNC: 107 MMOL/L (ref 98–107)
CHOLEST SERPL-MCNC: 225 MG/DL
CHOLESTEROL/HDL RATIO: 4
CO2 SERPL-SCNC: 23 MMOL/L (ref 20–31)
CREAT SERPL-MCNC: 0.58 MG/DL (ref 0.5–0.9)
ERYTHROCYTE [DISTWIDTH] IN BLOOD BY AUTOMATED COUNT: 14.8 % (ref 11.8–14.4)
EST. AVERAGE GLUCOSE BLD GHB EST-MCNC: 131 MG/DL
GFR SERPL CREATININE-BSD FRML MDRD: >60 ML/MIN/1.73M2
GLUCOSE SERPL-MCNC: 101 MG/DL (ref 70–99)
HBA1C MFR BLD: 6.2 % (ref 4–6)
HCT VFR BLD AUTO: 39 % (ref 36.3–47.1)
HDLC SERPL-MCNC: 56 MG/DL
HGB BLD-MCNC: 12.5 G/DL (ref 11.9–15.1)
LDLC SERPL CALC-MCNC: 151 MG/DL (ref 0–130)
MCH RBC QN AUTO: 27.4 PG (ref 25.2–33.5)
MCHC RBC AUTO-ENTMCNC: 32.1 G/DL (ref 28.4–34.8)
MCV RBC AUTO: 85.5 FL (ref 82.6–102.9)
NRBC BLD-RTO: 0 PER 100 WBC
PLATELET # BLD AUTO: 295 K/UL (ref 138–453)
PMV BLD AUTO: 11.4 FL (ref 8.1–13.5)
POTASSIUM SERPL-SCNC: 3.9 MMOL/L (ref 3.7–5.3)
PROT SERPL-MCNC: 7.4 G/DL (ref 6.4–8.3)
RBC # BLD AUTO: 4.56 M/UL (ref 3.95–5.11)
SODIUM SERPL-SCNC: 142 MMOL/L (ref 135–144)
T4 FREE SERPL-MCNC: 1.3 NG/DL (ref 0.9–1.7)
TRIGL SERPL-MCNC: 91 MG/DL
TSH SERPL DL<=0.05 MIU/L-ACNC: 0.25 UIU/ML (ref 0.3–5)
WBC OTHER # BLD: 5.1 K/UL (ref 3.5–11.3)

## 2023-08-22 ENCOUNTER — TELEPHONE (OUTPATIENT)
Dept: INTERNAL MEDICINE | Age: 63
End: 2023-08-22

## 2023-08-22 NOTE — TELEPHONE ENCOUNTER
Placed call to patient and added her to the wait list for future appt. Dr. Holden Congress schedule doesn't have any appts until December.

## 2023-08-22 NOTE — TELEPHONE ENCOUNTER
----- Message from Andres Zepeda sent at 8/21/2023 12:34 PM EDT -----  Subject: Appointment Request    Reason for Call: Established Patient Appointment needed: Routine Existing   Condition Follow Up    QUESTIONS    Reason for appointment request? No appointments available during search     Additional Information for Provider? The patient would like to reschedule   her 8/23/23 appointment for a follow-up to discuss labs. The patient would   like to reschedule for the week of 9/4/23.  Please Advise   ---------------------------------------------------------------------------  --------------  Nel Zepeda INFO  [phone]; OK to leave message on voicemail  ---------------------------------------------------------------------------  --------------  SCRIPT ANSWERS

## 2023-08-22 NOTE — TELEPHONE ENCOUNTER
----- Message from Adam Benitez sent at 8/21/2023 12:34 PM EDT -----  Subject: Appointment Request    Reason for Call: Established Patient Appointment needed: Routine Existing   Condition Follow Up    QUESTIONS    Reason for appointment request? No appointments available during search     Additional Information for Provider? The patient would like to reschedule   her 8/23/23 appointment for a follow-up to discuss labs. The patient would   like to reschedule for the week of 9/4/23.  Please Advise   ---------------------------------------------------------------------------  --------------  Marco Antonio SANDOVAL  [phone]; OK to leave message on voicemail  ---------------------------------------------------------------------------  --------------  SCRIPT ANSWERS

## 2023-08-22 NOTE — TELEPHONE ENCOUNTER
----- Message from Baylee Vazquez sent at 8/21/2023 12:34 PM EDT -----  Subject: Appointment Request    Reason for Call: Established Patient Appointment needed: Routine Existing   Condition Follow Up    QUESTIONS    Reason for appointment request? No appointments available during search     Additional Information for Provider? The patient would like to reschedule   her 8/23/23 appointment for a follow-up to discuss labs. The patient would   like to reschedule for the week of 9/4/23.  Please Advise   ---------------------------------------------------------------------------  --------------  Janet Chavezant INFO  [phone]; OK to leave message on voicemail  ---------------------------------------------------------------------------  --------------  SCRIPT ANSWERS

## 2023-08-22 NOTE — TELEPHONE ENCOUNTER
----- Message from Baylee Vazquez sent at 8/21/2023 12:34 PM EDT -----  Subject: Appointment Request    Reason for Call: Established Patient Appointment needed: Routine Existing   Condition Follow Up    QUESTIONS    Reason for appointment request? No appointments available during search     Additional Information for Provider? The patient would like to reschedule   her 8/23/23 appointment for a follow-up to discuss labs. The patient would   like to reschedule for the week of 9/4/23.  Please Advise   ---------------------------------------------------------------------------  --------------  Janet Barton INFO  [phone]; OK to leave message on voicemail  ---------------------------------------------------------------------------  --------------  SCRIPT ANSWERS

## 2023-08-22 NOTE — TELEPHONE ENCOUNTER
----- Message from Bonita Kohli sent at 8/21/2023 12:34 PM EDT -----  Subject: Appointment Request    Reason for Call: Established Patient Appointment needed: Routine Existing   Condition Follow Up    QUESTIONS    Reason for appointment request? No appointments available during search     Additional Information for Provider? The patient would like to reschedule   her 8/23/23 appointment for a follow-up to discuss labs. The patient would   like to reschedule for the week of 9/4/23.  Please Advise   ---------------------------------------------------------------------------  --------------  Denton Standing INFO  [phone]; OK to leave message on voicemail  ---------------------------------------------------------------------------  --------------  SCRIPT ANSWERS

## 2023-08-26 DIAGNOSIS — I10 ESSENTIAL HYPERTENSION: ICD-10-CM

## 2023-08-30 RX ORDER — AMLODIPINE BESYLATE 10 MG/1
TABLET ORAL
Qty: 90 TABLET | Refills: 0 | Status: SHIPPED | OUTPATIENT
Start: 2023-08-30

## 2023-08-30 NOTE — TELEPHONE ENCOUNTER
Chronic pain syndrome     Posterior dislocation of hip (HCC)     History of total right hip replacement     Frequent PVCs     Acute cystitis without hematuria     Primary osteoarthritis of both hips     Right hip pain     History of total right hip arthroplasty     Hip instability     Instability of prosthetic hip (HCC)     Moderate episode of recurrent major depressive disorder ([address])

## 2023-11-22 DIAGNOSIS — I10 ESSENTIAL HYPERTENSION: ICD-10-CM

## 2023-11-22 NOTE — TELEPHONE ENCOUNTER
.. Request for   Requested Prescriptions     Pending Prescriptions Disp Refills    amLODIPine (NORVASC) 10 MG tablet [Pharmacy Med Name: AMLODIPINE BESYLATE 10 MG TAB] 90 tablet 0     Sig: take 1 tablet by mouth once daily    . Please review and e-scribe to pharmacy listed in chart if appropriate. Thank you. Last Visit Date: 6/28/2023  Next Visit Date: Visit date not found    No future appointments.     Health Maintenance   Topic Date Due    Pneumococcal 0-64 years Vaccine (1 - PCV) Never done    HIV screen  Never done    Hepatitis C screen  Never done    DTaP/Tdap/Td vaccine (1 - Tdap) Never done    Shingles vaccine (1 of 2) Never done    Breast cancer screen  05/08/2023    Flu vaccine (1) Never done    COVID-19 Vaccine (3 - 2023-24 season) 09/01/2023    Depression Monitoring  06/28/2024    A1C test (Diabetic or Prediabetic)  07/06/2024    Lipids  07/06/2028    Colorectal Cancer Screen  08/25/2033    Hepatitis A vaccine  Aged Out    Hepatitis B vaccine  Aged Out    Hib vaccine  Aged Out    Meningococcal (ACWY) vaccine  Aged Out    Diabetes screen  Discontinued       Hemoglobin A1C (%)   Date Value   07/06/2023 6.2 (H)   09/20/2022 5.9   07/08/2021 5.9             ( goal A1C is < 7)   No components found for: \"LABMICR\"  LDL Cholesterol (mg/dL)   Date Value   07/06/2023 151 (H)       (goal LDL is <100)   AST (U/L)   Date Value   07/06/2023 24     ALT (U/L)   Date Value   07/06/2023 25     BUN (mg/dL)   Date Value   07/06/2023 12     BP Readings from Last 3 Encounters:   06/28/23 136/82   09/20/22 122/81   07/08/21 126/81          (goal 120/80)    All Future Testing planned in [facility]  Lab Frequency Next Occurrence   KARO DIGITAL SCREEN W OR WO CAD BILATERAL Once 06/28/2023   Urinalysis with Reflex to Culture Once 06/28/2023         Patient Active Problem List:     Osteoarthritis of both knees     Osteoarthritis of both shoulders     DJD (degenerative joint disease), lumbosacral     Essential hypertension

## 2023-11-26 RX ORDER — AMLODIPINE BESYLATE 10 MG/1
TABLET ORAL
Qty: 90 TABLET | Refills: 0 | Status: SHIPPED | OUTPATIENT
Start: 2023-11-26

## 2024-01-03 ENCOUNTER — TELEPHONE (OUTPATIENT)
Dept: INTERNAL MEDICINE | Age: 64
End: 2024-01-03

## 2024-01-03 NOTE — TELEPHONE ENCOUNTER
PC to pt but no answer. Lvm identifying self and asking for a callback to get appointments scheduled for pcp follow up and mammogram on [facility] on 1/9/24

## 2024-01-15 NOTE — TELEPHONE ENCOUNTER
..Request for   Requested Prescriptions     Pending Prescriptions Disp Refills    colchicine (COLCRYS) 0.6 MG tablet [Pharmacy Med Name: COLCHICINE 0.6 MG TABLET] 30 tablet 5     Sig: take 1 tablet by mouth once daily    .      Please review and e-scribe to pharmacy listed in chart if appropriate. Thank you.      Last Visit Date: 6/28/2023  Next Visit Date: Visit date not found    No future appointments.    Health Maintenance   Topic Date Due    Pneumococcal 0-64 years Vaccine (1 - PCV) Never done    HIV screen  Never done    Hepatitis C screen  Never done    DTaP/Tdap/Td vaccine (1 - Tdap) Never done    Shingles vaccine (1 of 2) Never done    Respiratory Syncytial Virus (RSV) Pregnant or age 60 yrs+ (1 - 1-dose 60+ series) Never done    Breast cancer screen  05/08/2023    Flu vaccine (1) Never done    COVID-19 Vaccine (3 - 2023-24 season) 09/01/2023    Depression Monitoring  06/28/2024    A1C test (Diabetic or Prediabetic)  07/06/2024    Lipids  07/06/2028    Colorectal Cancer Screen  08/25/2033    Hepatitis A vaccine  Aged Out    Hepatitis B vaccine  Aged Out    Hib vaccine  Aged Out    Polio vaccine  Aged Out    Meningococcal (ACWY) vaccine  Aged Out    Diabetes screen  Discontinued       Hemoglobin A1C (%)   Date Value   07/06/2023 6.2 (H)   09/20/2022 5.9   07/08/2021 5.9             ( goal A1C is < 7)   No components found for: \"LABMICR\"  LDL Cholesterol (mg/dL)   Date Value   07/06/2023 151 (H)       (goal LDL is <100)   AST (U/L)   Date Value   07/06/2023 24     ALT (U/L)   Date Value   07/06/2023 25     BUN (mg/dL)   Date Value   07/06/2023 12     BP Readings from Last 3 Encounters:   06/28/23 136/82   09/20/22 122/81   07/08/21 126/81          (goal 120/80)    All Future Testing planned in [facility]  Lab Frequency Next Occurrence   KARO DIGITAL SCREEN W OR WO CAD BILATERAL Once 06/28/2023   Urinalysis with Reflex to Culture Once 06/28/2023         Patient Active Problem List:     Osteoarthritis of both

## 2024-01-16 RX ORDER — COLCHICINE 0.6 MG/1
0.6 TABLET ORAL DAILY
Qty: 30 TABLET | Refills: 0 | Status: SHIPPED | OUTPATIENT
Start: 2024-01-16

## 2024-02-08 RX ORDER — COLCHICINE 0.6 MG/1
0.6 TABLET ORAL DAILY
Qty: 30 TABLET | Refills: 0 | Status: SHIPPED | OUTPATIENT
Start: 2024-02-08

## 2024-02-08 RX ORDER — COLCHICINE 0.6 MG/1
0.6 TABLET ORAL DAILY
Qty: 30 TABLET | Refills: 0 | OUTPATIENT
Start: 2024-02-08

## 2024-02-08 NOTE — TELEPHONE ENCOUNTER
..Request for   Requested Prescriptions     Pending Prescriptions Disp Refills    colchicine (COLCRYS) 0.6 MG tablet [Pharmacy Med Name: COLCHICINE 0.6 MG TABLET] 30 tablet 0     Sig: take 1 tablet by mouth once daily    .      Please review and e-scribe to pharmacy listed in chart if appropriate. Thank you.      Last Visit Date: 6/28/2023  Next Visit Date: Visit date not found    No future appointments.    Health Maintenance   Topic Date Due    Pneumococcal 0-64 years Vaccine (1 - PCV) Never done    HIV screen  Never done    Hepatitis C screen  Never done    DTaP/Tdap/Td vaccine (1 - Tdap) Never done    Shingles vaccine (1 of 2) Never done    Respiratory Syncytial Virus (RSV) Pregnant or age 60 yrs+ (1 - 1-dose 60+ series) Never done    Breast cancer screen  05/08/2023    Flu vaccine (1) Never done    COVID-19 Vaccine (3 - 2023-24 season) 09/01/2023    Depression Monitoring  06/28/2024    A1C test (Diabetic or Prediabetic)  07/06/2024    Lipids  07/06/2028    Colorectal Cancer Screen  08/25/2033    Hepatitis A vaccine  Aged Out    Hepatitis B vaccine  Aged Out    Hib vaccine  Aged Out    Polio vaccine  Aged Out    Meningococcal (ACWY) vaccine  Aged Out    Diabetes screen  Discontinued       Hemoglobin A1C (%)   Date Value   07/06/2023 6.2 (H)   09/20/2022 5.9   07/08/2021 5.9             ( goal A1C is < 7)   No components found for: \"LABMICR\"  LDL Cholesterol (mg/dL)   Date Value   07/06/2023 151 (H)       (goal LDL is <100)   AST (U/L)   Date Value   07/06/2023 24     ALT (U/L)   Date Value   07/06/2023 25     BUN (mg/dL)   Date Value   07/06/2023 12     BP Readings from Last 3 Encounters:   06/28/23 136/82   09/20/22 122/81   07/08/21 126/81          (goal 120/80)    All Future Testing planned in [facility]  Lab Frequency Next Occurrence   KARO DIGITAL SCREEN W OR WO CAD BILATERAL Once 06/28/2023   Urinalysis with Reflex to Culture Once 06/28/2023         Patient Active Problem List:     Osteoarthritis of both

## 2024-02-08 NOTE — TELEPHONE ENCOUNTER
----- Message from Flor Villalpando sent at 2/8/2024  1:31 PM EST -----  Subject: Refill Request    QUESTIONS  Name of Medication? colchicine (COLCRYS) 0.6 MG tablet  Patient-reported dosage and instructions? 0.6 MG take 1 tablet by mouth   once daily  How many days do you have left? 7  Preferred Pharmacy? [id number]  Pharmacy phone number (if available)? [phone]  Additional Information for Provider? PT needs refill before upcoming appt   in May please call PT back once refill is sent   ---------------------------------------------------------------------------  --------------  CALL BACK INFO  What is the best way for the office to contact you? OK to leave message on   voicemail  Preferred Call Back Phone Number? [phone]  ---------------------------------------------------------------------------  --------------  SCRIPT ANSWERS  Relationship to Patient? Self

## 2024-02-14 ENCOUNTER — TELEPHONE (OUTPATIENT)
Dept: INTERNAL MEDICINE | Age: 64
End: 2024-02-14

## 2024-02-25 DIAGNOSIS — I10 ESSENTIAL HYPERTENSION: ICD-10-CM

## 2024-02-26 NOTE — TELEPHONE ENCOUNTER
Once 06/28/2023         Patient Active Problem List:     Osteoarthritis of both knees     Osteoarthritis of both shoulders     DJD (degenerative joint disease), lumbosacral     Essential hypertension     Hyperlipidemia     Depression     Obesity     Postlaminectomy syndrome, lumbar region     Shoulder bursitis     S/P lumbar spinal fusion     Encounter for medication monitoring     Chronic pain syndrome     Posterior dislocation of hip (HCC)     History of total right hip replacement     Frequent PVCs     Acute cystitis without hematuria     Primary osteoarthritis of both hips     Right hip pain     History of total right hip arthroplasty     Hip instability     Instability of prosthetic hip (HCC)     Moderate episode of recurrent major depressive disorder (HCC)

## 2024-02-27 RX ORDER — AMLODIPINE BESYLATE 10 MG/1
TABLET ORAL
Qty: 90 TABLET | Refills: 0 | Status: SHIPPED | OUTPATIENT
Start: 2024-02-27

## 2024-03-07 RX ORDER — COLCHICINE 0.6 MG/1
0.6 TABLET ORAL DAILY
Qty: 30 TABLET | Refills: 0 | Status: SHIPPED | OUTPATIENT
Start: 2024-03-07

## 2024-03-07 NOTE — TELEPHONE ENCOUNTER
..Request for   Requested Prescriptions     Pending Prescriptions Disp Refills    colchicine (COLCRYS) 0.6 MG tablet [Pharmacy Med Name: COLCHICINE 0.6 MG TABLET] 30 tablet 0     Sig: take 1 tablet by mouth once daily    .      Please review and e-scribe to pharmacy listed in chart if appropriate. Thank you.      Last Visit Date: 6/28/2023  Next Visit Date: 5/7/2024    Future Appointments   Date Time Provider Department Center   5/7/2024 11:20 AM Paulette Neumann MD [facility]       Health Maintenance   Topic Date Due    Pneumococcal 0-64 years Vaccine (1 - PCV) Never done    HIV screen  Never done    Hepatitis C screen  Never done    DTaP/Tdap/Td vaccine (1 - Tdap) Never done    Shingles vaccine (1 of 2) Never done    Respiratory Syncytial Virus (RSV) Pregnant or age 60 yrs+ (1 - 1-dose 60+ series) Never done    Breast cancer screen  05/08/2023    Flu vaccine (1) Never done    COVID-19 Vaccine (3 - 2023-24 season) 09/01/2023    Depression Monitoring  06/28/2024    A1C test (Diabetic or Prediabetic)  07/06/2024    Lipids  07/06/2028    Colorectal Cancer Screen  08/25/2033    Hepatitis A vaccine  Aged Out    Hepatitis B vaccine  Aged Out    Hib vaccine  Aged Out    Polio vaccine  Aged Out    Meningococcal (ACWY) vaccine  Aged Out    Diabetes screen  Discontinued       Hemoglobin A1C (%)   Date Value   07/06/2023 6.2 (H)   09/20/2022 5.9   07/08/2021 5.9             ( goal A1C is < 7)   No components found for: \"LABMICR\"  LDL Cholesterol (mg/dL)   Date Value   07/06/2023 151 (H)       (goal LDL is <100)   AST (U/L)   Date Value   07/06/2023 24     ALT (U/L)   Date Value   07/06/2023 25     BUN (mg/dL)   Date Value   07/06/2023 12     BP Readings from Last 3 Encounters:   06/28/23 136/82   09/20/22 122/81   07/08/21 126/81          (goal 120/80)    All Future Testing planned in [facility]  Lab Frequency Next Occurrence   KARO DIGITAL SCREEN W OR WO CAD BILATERAL Once 06/28/2023   Urinalysis with Reflex to Culture

## 2024-04-09 NOTE — TELEPHONE ENCOUNTER
..Request for   Requested Prescriptions     Pending Prescriptions Disp Refills    colchicine (COLCRYS) 0.6 MG tablet [Pharmacy Med Name: COLCHICINE 0.6 MG TABLET] 30 tablet 0     Sig: take 1 tablet by mouth once daily    .      Please review and e-scribe to pharmacy listed in chart if appropriate. Thank you.      Last Visit Date: 6/28/2023  Next Visit Date: 5/7/2024    Future Appointments   Date Time Provider Department Center   5/7/2024 11:20 AM Paulette Neumann MD [facility]       Health Maintenance   Topic Date Due    Pneumococcal 0-64 years Vaccine (1 of 2 - PCV) Never done    HIV screen  Never done    Hepatitis C screen  Never done    DTaP/Tdap/Td vaccine (1 - Tdap) Never done    Shingles vaccine (1 of 2) Never done    Respiratory Syncytial Virus (RSV) Pregnant or age 60 yrs+ (1 - 1-dose 60+ series) Never done    Breast cancer screen  05/08/2023    COVID-19 Vaccine (3 - 2023-24 season) 09/01/2023    Depression Monitoring  06/28/2024    A1C test (Diabetic or Prediabetic)  07/06/2024    Flu vaccine (Season Ended) 08/01/2024    Lipids  07/06/2028    Colorectal Cancer Screen  08/25/2033    Hepatitis A vaccine  Aged Out    Hepatitis B vaccine  Aged Out    Hib vaccine  Aged Out    Polio vaccine  Aged Out    Meningococcal (ACWY) vaccine  Aged Out    Diabetes screen  Discontinued       Hemoglobin A1C (%)   Date Value   07/06/2023 6.2 (H)   09/20/2022 5.9   07/08/2021 5.9             ( goal A1C is < 7)   No components found for: \"LABMICR\"  LDL Cholesterol (mg/dL)   Date Value   07/06/2023 151 (H)       (goal LDL is <100)   AST (U/L)   Date Value   07/06/2023 24     ALT (U/L)   Date Value   07/06/2023 25     BUN (mg/dL)   Date Value   07/06/2023 12     BP Readings from Last 3 Encounters:   06/28/23 136/82   09/20/22 122/81   07/08/21 126/81          (goal 120/80)    All Future Testing planned in [facility]  Lab Frequency Next Occurrence   KARO DIGITAL SCREEN W OR WO CAD BILATERAL Once 06/28/2023   Urinalysis with

## 2024-04-12 RX ORDER — COLCHICINE 0.6 MG/1
0.6 TABLET ORAL DAILY
Qty: 30 TABLET | Refills: 0 | Status: SHIPPED | OUTPATIENT
Start: 2024-04-12

## 2024-05-07 ENCOUNTER — OFFICE VISIT (OUTPATIENT)
Dept: INTERNAL MEDICINE | Age: 64
End: 2024-05-07
Payer: COMMERCIAL

## 2024-05-07 VITALS
BODY MASS INDEX: 28.12 KG/M2 | HEIGHT: 66 IN | HEART RATE: 91 BPM | WEIGHT: 175 LBS | OXYGEN SATURATION: 98 % | DIASTOLIC BLOOD PRESSURE: 79 MMHG | SYSTOLIC BLOOD PRESSURE: 130 MMHG | TEMPERATURE: 97.3 F

## 2024-05-07 DIAGNOSIS — F17.200 SMOKER: ICD-10-CM

## 2024-05-07 DIAGNOSIS — R73.02 IGT (IMPAIRED GLUCOSE TOLERANCE): ICD-10-CM

## 2024-05-07 DIAGNOSIS — E79.0 HYPERURICEMIA: ICD-10-CM

## 2024-05-07 DIAGNOSIS — M96.1 POSTLAMINECTOMY SYNDROME, LUMBAR REGION: Chronic | ICD-10-CM

## 2024-05-07 DIAGNOSIS — I50.32 CHRONIC DIASTOLIC CONGESTIVE HEART FAILURE (HCC): ICD-10-CM

## 2024-05-07 DIAGNOSIS — I10 ESSENTIAL HYPERTENSION: Primary | ICD-10-CM

## 2024-05-07 DIAGNOSIS — M16.0 PRIMARY OSTEOARTHRITIS OF BOTH HIPS: Chronic | ICD-10-CM

## 2024-05-07 DIAGNOSIS — F33.1 MODERATE EPISODE OF RECURRENT MAJOR DEPRESSIVE DISORDER (HCC): ICD-10-CM

## 2024-05-07 DIAGNOSIS — E78.00 PURE HYPERCHOLESTEROLEMIA: ICD-10-CM

## 2024-05-07 PROCEDURE — 99214 OFFICE O/P EST MOD 30 MIN: CPT | Performed by: INTERNAL MEDICINE

## 2024-05-07 PROCEDURE — 99213 OFFICE O/P EST LOW 20 MIN: CPT | Performed by: INTERNAL MEDICINE

## 2024-05-07 PROCEDURE — 3075F SYST BP GE 130 - 139MM HG: CPT | Performed by: INTERNAL MEDICINE

## 2024-05-07 PROCEDURE — G8419 CALC BMI OUT NRM PARAM NOF/U: HCPCS | Performed by: INTERNAL MEDICINE

## 2024-05-07 PROCEDURE — 4004F PT TOBACCO SCREEN RCVD TLK: CPT | Performed by: INTERNAL MEDICINE

## 2024-05-07 PROCEDURE — 3017F COLORECTAL CA SCREEN DOC REV: CPT | Performed by: INTERNAL MEDICINE

## 2024-05-07 PROCEDURE — G8427 DOCREV CUR MEDS BY ELIG CLIN: HCPCS | Performed by: INTERNAL MEDICINE

## 2024-05-07 PROCEDURE — 3078F DIAST BP <80 MM HG: CPT | Performed by: INTERNAL MEDICINE

## 2024-05-07 RX ORDER — AMLODIPINE BESYLATE 10 MG/1
10 TABLET ORAL DAILY
Qty: 90 TABLET | Refills: 1 | Status: SHIPPED | OUTPATIENT
Start: 2024-05-07

## 2024-05-07 RX ORDER — COLCHICINE 0.6 MG/1
0.6 TABLET ORAL DAILY
Qty: 30 TABLET | Refills: 0 | OUTPATIENT
Start: 2024-05-07

## 2024-05-07 RX ORDER — OMEPRAZOLE 20 MG/1
CAPSULE, DELAYED RELEASE ORAL
Qty: 90 CAPSULE | Refills: 1 | Status: SHIPPED | OUTPATIENT
Start: 2024-05-07

## 2024-05-07 RX ORDER — COLCHICINE 0.6 MG/1
0.6 TABLET ORAL DAILY
Qty: 90 TABLET | Refills: 1 | Status: SHIPPED | OUTPATIENT
Start: 2024-05-07

## 2024-05-07 ASSESSMENT — ENCOUNTER SYMPTOMS
SHORTNESS OF BREATH: 0
COUGH: 1
CONSTIPATION: 0
WHEEZING: 0
BACK PAIN: 1
ABDOMINAL PAIN: 0
BLOOD IN STOOL: 0

## 2024-05-07 NOTE — PATIENT INSTRUCTIONS
Patient was put on a 6 month wait list and will be contacted to schedule their next follow up appointment once the schedule is available. If the patient is in need of an appointment before their next visit please call the office at [phone]. After Visit Summary  given and reviewed.    Sg

## 2024-05-07 NOTE — PROGRESS NOTES
Cardiovascular:  Negative for chest pain, palpitations and leg swelling.   Gastrointestinal:  Negative for abdominal pain, blood in stool and constipation.   Genitourinary:  Positive for hematuria. Negative for dysuria and frequency.   Musculoskeletal:  Positive for arthralgias and back pain. Negative for joint swelling.   Neurological:  Negative for dizziness, seizures, syncope, weakness and headaches.   Hematological:  Negative for adenopathy. Does not bruise/bleed easily.        PHYSICAL EXAM:     Vitals:    05/07/24 1134   BP: 130/79   Site: Right Upper Arm   Position: Sitting   Cuff Size: Medium Adult   Pulse: 91   Temp: 97.3 °F (36.3 °C)   SpO2: 98%   Weight: 79.4 kg (175 lb)   Height: 1.676 m (5' 5.98\")     Body mass index is 28.26 kg/m².    BP Readings from Last 3 Encounters:   05/07/24 130/79   06/28/23 136/82   09/20/22 122/81        Wt Readings from Last 3 Encounters:   05/07/24 79.4 kg (175 lb)   06/28/23 83.5 kg (184 lb)   09/20/22 81.6 kg (180 lb)       Physical Exam  Vitals and nursing note reviewed.   Constitutional:       Appearance: Normal appearance.   HENT:      Head: Normocephalic and atraumatic.   Eyes:      Extraocular Movements: Extraocular movements intact.      Conjunctiva/sclera: Conjunctivae normal.      Pupils: Pupils are equal, round, and reactive to light.   Cardiovascular:      Rate and Rhythm: Normal rate and regular rhythm.      Heart sounds: No murmur heard.  Pulmonary:      Effort: Pulmonary effort is normal.      Breath sounds: Normal breath sounds. No wheezing or rales.   Musculoskeletal:      Cervical back: Normal range of motion.      Right lower leg: No edema.      Left lower leg: No edema.   Lymphadenopathy:      Cervical: No cervical adenopathy.   Neurological:      General: No focal deficit present.      Mental Status: She is alert and oriented to person, place, and time.         LABORATORY FINDINGS:    CBC:  Lab Results   Component Value Date/Time    WBC 5.1 07/06/2023

## 2024-06-26 ENCOUNTER — HOSPITAL ENCOUNTER (OUTPATIENT)
Age: 64
Discharge: HOME | End: 2024-06-26
Attending: INTERNAL MEDICINE
Payer: COMMERCIAL

## 2024-06-26 ENCOUNTER — HOSPITAL ENCOUNTER (OUTPATIENT)
Age: 64
Discharge: HOME | End: 2024-06-28
Attending: INTERNAL MEDICINE
Payer: COMMERCIAL

## 2024-06-26 VITALS — HEIGHT: 65 IN | WEIGHT: 175 LBS | BODY MASS INDEX: 29.16 KG/M2

## 2024-06-26 DIAGNOSIS — I50.32 CHRONIC DIASTOLIC CONGESTIVE HEART FAILURE (HCC): ICD-10-CM

## 2024-06-26 LAB
ECHO AO ROOT DIAM: 3.2 CM
ECHO AO ROOT INDEX: 1.71 CM/M2
ECHO AV MEAN GRADIENT: 2 MMHG
ECHO AV MEAN VELOCITY: 0.6 M/S
ECHO AV PEAK GRADIENT: 4 MMHG
ECHO AV PEAK VELOCITY: 1 M/S
ECHO AV VELOCITY RATIO: 0.9
ECHO AV VTI: 25.1 CM
ECHO BSA: 1.91 M2
ECHO LA AREA 2C: 17.4 CM2
ECHO LA AREA 4C: 17.2 CM2
ECHO LA DIAMETER INDEX: 1.93 CM/M2
ECHO LA DIAMETER: 3.6 CM
ECHO LA MAJOR AXIS: 4.8 CM
ECHO LA MINOR AXIS: 4.8 CM
ECHO LA TO AORTIC ROOT RATIO: 1.13
ECHO LA VOL BP: 50 ML (ref 22–52)
ECHO LA VOL MOD A2C: 50 ML (ref 22–52)
ECHO LA VOL MOD A4C: 50 ML (ref 22–52)
ECHO LA VOL/BSA BIPLANE: 27 ML/M2 (ref 16–34)
ECHO LA VOLUME INDEX MOD A2C: 27 ML/M2 (ref 16–34)
ECHO LA VOLUME INDEX MOD A4C: 27 ML/M2 (ref 16–34)
ECHO LV E' LATERAL VELOCITY: 7 CM/S
ECHO LV E' SEPTAL VELOCITY: 5 CM/S
ECHO LV FRACTIONAL SHORTENING: 40 % (ref 28–44)
ECHO LV INTERNAL DIMENSION DIASTOLE INDEX: 2.67 CM/M2
ECHO LV INTERNAL DIMENSION DIASTOLIC: 5 CM (ref 3.9–5.3)
ECHO LV INTERNAL DIMENSION SYSTOLIC INDEX: 1.6 CM/M2
ECHO LV INTERNAL DIMENSION SYSTOLIC: 3 CM
ECHO LV IVSD: 0.9 CM (ref 0.6–0.9)
ECHO LV MASS 2D: 146.8 G (ref 67–162)
ECHO LV MASS INDEX 2D: 78.5 G/M2 (ref 43–95)
ECHO LV POSTERIOR WALL DIASTOLIC: 0.8 CM (ref 0.6–0.9)
ECHO LV RELATIVE WALL THICKNESS RATIO: 0.32
ECHO LVOT PEAK GRADIENT: 3 MMHG
ECHO LVOT PEAK VELOCITY: 0.9 M/S
ECHO MV A VELOCITY: 0.94 M/S
ECHO MV E DECELERATION TIME (DT): 268 MS
ECHO MV E VELOCITY: 0.63 M/S
ECHO MV E/A RATIO: 0.67
ECHO MV E/E' LATERAL: 9
ECHO MV E/E' RATIO (AVERAGED): 10.8
ECHO MV E/E' SEPTAL: 12.6

## 2024-06-26 PROCEDURE — 93005 ELECTROCARDIOGRAM TRACING: CPT | Performed by: INTERNAL MEDICINE

## 2024-06-26 PROCEDURE — 93306 TTE W/DOPPLER COMPLETE: CPT

## 2024-06-27 LAB
EKG ATRIAL RATE: 72 BPM
EKG P AXIS: 60 DEGREES
EKG P-R INTERVAL: 202 MS
EKG Q-T INTERVAL: 420 MS
EKG QRS DURATION: 96 MS
EKG QTC CALCULATION (BAZETT): 459 MS
EKG R AXIS: 25 DEGREES
EKG T AXIS: 51 DEGREES
EKG VENTRICULAR RATE: 72 BPM

## 2024-06-28 ENCOUNTER — HOSPITAL ENCOUNTER (OUTPATIENT)
Dept: GENERAL RADIOLOGY | Age: 64
End: 2024-06-28
Payer: COMMERCIAL

## 2024-06-28 ENCOUNTER — HOSPITAL ENCOUNTER (OUTPATIENT)
Dept: MRI IMAGING | Age: 64
Discharge: HOME | End: 2024-06-28
Payer: COMMERCIAL

## 2024-06-28 ENCOUNTER — HOSPITAL ENCOUNTER (OUTPATIENT)
Age: 64
End: 2024-06-28
Payer: COMMERCIAL

## 2024-06-28 ENCOUNTER — HOSPITAL ENCOUNTER (OUTPATIENT)
Age: 64
Discharge: HOME | End: 2024-06-28
Payer: COMMERCIAL

## 2024-06-28 DIAGNOSIS — I10 ESSENTIAL HYPERTENSION: ICD-10-CM

## 2024-06-28 DIAGNOSIS — M75.41 IMPINGEMENT SYNDROME OF RIGHT SHOULDER: ICD-10-CM

## 2024-06-28 DIAGNOSIS — E78.00 PURE HYPERCHOLESTEROLEMIA: ICD-10-CM

## 2024-06-28 DIAGNOSIS — M17.0 PRIMARY OSTEOARTHRITIS OF BOTH KNEES: ICD-10-CM

## 2024-06-28 DIAGNOSIS — F33.1 MODERATE EPISODE OF RECURRENT MAJOR DEPRESSIVE DISORDER (HCC): ICD-10-CM

## 2024-06-28 DIAGNOSIS — M54.2 CERVICALGIA: ICD-10-CM

## 2024-06-28 DIAGNOSIS — R73.02 IGT (IMPAIRED GLUCOSE TOLERANCE): ICD-10-CM

## 2024-06-28 LAB
ALBUMIN SERPL-MCNC: 4.2 G/DL (ref 3.5–5.2)
ALBUMIN/GLOB SERPL: 1 {RATIO} (ref 1–2.5)
ALP SERPL-CCNC: 38 U/L (ref 35–104)
ALT SERPL-CCNC: 11 U/L (ref 10–35)
ANION GAP SERPL CALCULATED.3IONS-SCNC: 10 MMOL/L (ref 9–16)
AST SERPL-CCNC: 16 U/L (ref 10–35)
BACTERIA URNS QL MICRO: ABNORMAL
BILIRUB SERPL-MCNC: 0.4 MG/DL (ref 0–1.2)
BILIRUB UR QL STRIP: NEGATIVE
BUN SERPL-MCNC: 13 MG/DL (ref 8–23)
CALCIUM SERPL-MCNC: 9.2 MG/DL (ref 8.6–10.4)
CASTS #/AREA URNS LPF: ABNORMAL /LPF (ref 0–8)
CHLORIDE SERPL-SCNC: 106 MMOL/L (ref 98–107)
CHOLEST SERPL-MCNC: 227 MG/DL (ref 0–199)
CHOLESTEROL/HDL RATIO: 3
CLARITY UR: CLEAR
CO2 SERPL-SCNC: 28 MMOL/L (ref 20–31)
COLOR UR: YELLOW
CREAT SERPL-MCNC: 0.8 MG/DL (ref 0.5–0.9)
EPI CELLS #/AREA URNS HPF: ABNORMAL /HPF (ref 0–5)
EST. AVERAGE GLUCOSE BLD GHB EST-MCNC: 126 MG/DL
GFR, ESTIMATED: 85 ML/MIN/1.73M2
GLUCOSE SERPL-MCNC: 88 MG/DL (ref 74–99)
GLUCOSE UR STRIP-MCNC: NEGATIVE MG/DL
HBA1C MFR BLD: 6 % (ref 4–6)
HDLC SERPL-MCNC: 80 MG/DL
HGB UR QL STRIP.AUTO: NEGATIVE
KETONES UR STRIP-MCNC: NEGATIVE MG/DL
LDLC SERPL CALC-MCNC: 131 MG/DL (ref 0–100)
LEUKOCYTE ESTERASE UR QL STRIP: ABNORMAL
NITRITE UR QL STRIP: NEGATIVE
PH UR STRIP: 7 [PH] (ref 5–8)
POTASSIUM SERPL-SCNC: 4.1 MMOL/L (ref 3.7–5.3)
PROT SERPL-MCNC: 7.4 G/DL (ref 6.6–8.7)
PROT UR STRIP-MCNC: NEGATIVE MG/DL
RBC #/AREA URNS HPF: ABNORMAL /HPF (ref 0–4)
SODIUM SERPL-SCNC: 144 MMOL/L (ref 136–145)
SP GR UR STRIP: 1.01 (ref 1–1.03)
TRIGL SERPL-MCNC: 80 MG/DL
TSH SERPL DL<=0.05 MIU/L-ACNC: 0.32 UIU/ML (ref 0.27–4.2)
URATE SERPL-MCNC: 5.9 MG/DL (ref 2.4–5.7)
UROBILINOGEN UR STRIP-ACNC: NORMAL EU/DL (ref 0–1)
VLDLC SERPL CALC-MCNC: 16 MG/DL
WBC #/AREA URNS HPF: ABNORMAL /HPF (ref 0–5)

## 2024-06-28 PROCEDURE — 80061 LIPID PANEL: CPT

## 2024-06-28 PROCEDURE — 80053 COMPREHEN METABOLIC PANEL: CPT

## 2024-06-28 PROCEDURE — 81001 URINALYSIS AUTO W/SCOPE: CPT

## 2024-06-28 PROCEDURE — 36415 COLL VENOUS BLD VENIPUNCTURE: CPT

## 2024-06-28 PROCEDURE — 83036 HEMOGLOBIN GLYCOSYLATED A1C: CPT

## 2024-06-28 PROCEDURE — 73562 X-RAY EXAM OF KNEE 3: CPT

## 2024-06-28 PROCEDURE — 72141 MRI NECK SPINE W/O DYE: CPT

## 2024-06-28 PROCEDURE — 84443 ASSAY THYROID STIM HORMONE: CPT

## 2024-06-28 PROCEDURE — 73030 X-RAY EXAM OF SHOULDER: CPT

## 2024-06-28 PROCEDURE — 84550 ASSAY OF BLOOD/URIC ACID: CPT

## 2024-08-08 DIAGNOSIS — I10 ESSENTIAL HYPERTENSION: ICD-10-CM

## 2024-08-09 DIAGNOSIS — E79.0 HYPERURICEMIA: ICD-10-CM

## 2024-08-09 RX ORDER — AMLODIPINE BESYLATE 10 MG/1
10 TABLET ORAL DAILY
Qty: 90 TABLET | Refills: 1 | Status: SHIPPED | OUTPATIENT
Start: 2024-08-09

## 2024-08-09 NOTE — TELEPHONE ENCOUNTER
Felicita Ashraf is calling to request a refill on the following medication(s):    Medication Request:  Requested Prescriptions     Pending Prescriptions Disp Refills    amLODIPine (NORVASC) 10 MG tablet [Pharmacy Med Name: AMLODIPINE BESYLATE 10MG TABLETS] 90 tablet 1     Sig: TAKE 1 TABLET BY MOUTH ONCE DAILY       Last Visit Date (If Applicable):  5/7/2024    Next Visit Date:    Visit date not found

## 2024-08-09 NOTE — TELEPHONE ENCOUNTER
Felicita Ashraf is calling to request a refill on the following medication(s):    Medication Request:  Requested Prescriptions     Pending Prescriptions Disp Refills    colchicine (COLCRYS) 0.6 MG tablet 90 tablet 1     Sig: Take 1 tablet by mouth daily       Last Visit Date (If Applicable):  5/7/2024    Next Visit Date:    Visit date not found

## 2024-08-10 RX ORDER — COLCHICINE 0.6 MG/1
0.6 TABLET ORAL DAILY
Qty: 90 TABLET | Refills: 1 | Status: SHIPPED | OUTPATIENT
Start: 2024-08-10

## 2024-08-28 DIAGNOSIS — E79.0 HYPERURICEMIA: ICD-10-CM

## 2024-08-28 NOTE — TELEPHONE ENCOUNTER
.Request for   Requested Prescriptions     Pending Prescriptions Disp Refills    colchicine (COLCRYS) 0.6 MG tablet 90 tablet 1     Sig: Take 1 tablet by mouth daily    .      Please review and e-scribe to pharmacy listed in chart if appropriate. Thank you.      Last Visit Date: 5/7/2024  Next Visit Date: Visit date not found    No future appointments.    Health Maintenance   Topic Date Due    Pneumococcal 0-64 years Vaccine (1 of 2 - PCV) Never done    HIV screen  Never done    Hepatitis C screen  Never done    DTaP/Tdap/Td vaccine (1 - Tdap) Never done    Shingles vaccine (1 of 2) Never done    Respiratory Syncytial Virus (RSV) Pregnant or age 60 yrs+ (1 - 1-dose 60+ series) Never done    Breast cancer screen  05/08/2023    COVID-19 Vaccine (3 - 2023-24 season) 09/01/2023    Depression Monitoring  06/28/2024    Flu vaccine (1) Never done    A1C test (Diabetic or Prediabetic)  06/28/2025    Lipids  06/28/2029    Colorectal Cancer Screen  08/25/2033    Hepatitis A vaccine  Aged Out    Hepatitis B vaccine  Aged Out    Hib vaccine  Aged Out    Polio vaccine  Aged Out    Meningococcal (ACWY) vaccine  Aged Out    Diabetes screen  Discontinued       Hemoglobin A1C (%)   Date Value   06/28/2024 6.0   07/06/2023 6.2 (H)   09/20/2022 5.9             ( goal A1C is < 7)   No components found for: \"LABMICR\"  No components found for: \"LDLCHOLESTEROL\", \"LDLCALC\"    (goal LDL is <100)   AST (U/L)   Date Value   06/28/2024 16     ALT (U/L)   Date Value   06/28/2024 11     BUN (mg/dL)   Date Value   06/28/2024 13     BP Readings from Last 3 Encounters:   05/07/24 130/79   06/28/23 136/82   09/20/22 122/81          (goal 120/80)    All Future Testing planned in [facility]  Lab Frequency Next Occurrence         Patient Active Problem List:     Osteoarthritis of both knees     Osteoarthritis of both shoulders     DJD (degenerative joint disease), lumbosacral     Essential hypertension     Hyperlipidemia     Depression     Obesity

## 2024-08-29 ENCOUNTER — TELEPHONE (OUTPATIENT)
Dept: INTERNAL MEDICINE | Age: 64
End: 2024-08-29

## 2024-08-29 RX ORDER — COLCHICINE 0.6 MG/1
0.6 TABLET ORAL DAILY
Qty: 90 TABLET | Refills: 1 | Status: SHIPPED | OUTPATIENT
Start: 2024-08-29

## 2024-08-29 NOTE — TELEPHONE ENCOUNTER
Received request for PA in/on [employer] for colchicine. Review of chart shows pt has been on medication for years.    Attempted to complete PA on Epic but it asked writer to select the correct PA form. Writer guessed incorrectly, received response:   The  is not the PA processor for this patient and medication combination.  Payer: [employer] Patient's Payer Case ID: [id number]    [phone]  Note from payer: [employer] does not manage prior authorizations for this patient. Please resubmit the ePA request to Lucia.    PA initiated on [employer] utilizing insurance card info in media. PA completed and sent to insurance.    Received immediate response: Available without authorization. The member is able to fill the requested drug at the pharmacy. If coverage is still needed or requesting prior to the expiration of a current authorization, a request can be made by sending a fax or calling the number on the back of the member's ID card.     PC to pharmacy, left secure VM with writer's info if they are having trouble processing medication.

## 2024-10-22 ENCOUNTER — HOSPITAL ENCOUNTER (OUTPATIENT)
Dept: PREADMISSION TESTING | Age: 64
Discharge: HOME | End: 2024-10-26
Payer: COMMERCIAL

## 2024-10-22 VITALS
OXYGEN SATURATION: 98 % | BODY MASS INDEX: 27.32 KG/M2 | HEIGHT: 66 IN | DIASTOLIC BLOOD PRESSURE: 76 MMHG | HEART RATE: 71 BPM | TEMPERATURE: 96.9 F | SYSTOLIC BLOOD PRESSURE: 136 MMHG | WEIGHT: 170 LBS | RESPIRATION RATE: 14 BRPM

## 2024-10-22 LAB
ABO + RH BLD: NORMAL
ALBUMIN SERPL-MCNC: 4.2 G/DL (ref 3.5–5.2)
ALP SERPL-CCNC: 36 U/L (ref 35–104)
ALT SERPL-CCNC: 13 U/L (ref 5–33)
ANION GAP SERPL CALCULATED.3IONS-SCNC: 10 MMOL/L (ref 9–17)
ARM BAND NUMBER: NORMAL
AST SERPL-CCNC: 17 U/L
BASOPHILS # BLD: 0.03 K/UL (ref 0–0.2)
BASOPHILS NFR BLD: 1 % (ref 0–2)
BILIRUB SERPL-MCNC: 0.4 MG/DL (ref 0.3–1.2)
BILIRUB UR QL STRIP: NEGATIVE
BLOOD BANK SAMPLE EXPIRATION: NORMAL
BLOOD GROUP ANTIBODIES SERPL: NEGATIVE
BUN SERPL-MCNC: 10 MG/DL (ref 8–23)
BUN/CREAT SERPL: 14 (ref 9–20)
CALCIUM SERPL-MCNC: 9.3 MG/DL (ref 8.6–10.4)
CHLORIDE SERPL-SCNC: 103 MMOL/L (ref 98–107)
CLARITY UR: CLEAR
CO2 SERPL-SCNC: 26 MMOL/L (ref 20–31)
COLOR UR: YELLOW
COMMENT: NORMAL
CREAT SERPL-MCNC: 0.7 MG/DL (ref 0.5–0.9)
EOSINOPHIL # BLD: 0.25 K/UL (ref 0–0.44)
EOSINOPHILS RELATIVE PERCENT: 5 % (ref 1–4)
ERYTHROCYTE [DISTWIDTH] IN BLOOD BY AUTOMATED COUNT: 14.6 % (ref 11.8–14.4)
ERYTHROCYTE [SEDIMENTATION RATE] IN BLOOD BY PHOTOMETRIC METHOD: 27 MM/HR (ref 0–30)
GFR, ESTIMATED: >90 ML/MIN/1.73M2
GLUCOSE SERPL-MCNC: 94 MG/DL (ref 70–99)
GLUCOSE UR STRIP-MCNC: NEGATIVE MG/DL
HCT VFR BLD AUTO: 38.2 % (ref 36.3–47.1)
HGB BLD-MCNC: 12.4 G/DL (ref 11.9–15.1)
HGB UR QL STRIP.AUTO: NEGATIVE
IMM GRANULOCYTES # BLD AUTO: 0.01 K/UL (ref 0–0.3)
IMM GRANULOCYTES NFR BLD: 0 %
KETONES UR STRIP-MCNC: NEGATIVE MG/DL
LEUKOCYTE ESTERASE UR QL STRIP: NEGATIVE
LYMPHOCYTES NFR BLD: 2.34 K/UL (ref 1.1–3.7)
LYMPHOCYTES RELATIVE PERCENT: 42 % (ref 24–43)
MCH RBC QN AUTO: 28.5 PG (ref 25.2–33.5)
MCHC RBC AUTO-ENTMCNC: 32.5 G/DL (ref 28.4–34.8)
MCV RBC AUTO: 87.8 FL (ref 82.6–102.9)
MONOCYTES NFR BLD: 0.54 K/UL (ref 0.1–1.2)
MONOCYTES NFR BLD: 10 % (ref 3–12)
NEUTROPHILS NFR BLD: 42 % (ref 36–65)
NEUTS SEG NFR BLD: 2.44 K/UL (ref 1.5–8.1)
NITRITE UR QL STRIP: NEGATIVE
NRBC BLD-RTO: 0 PER 100 WBC
PH UR STRIP: 5.5 [PH] (ref 5–8)
PLATELET # BLD AUTO: 247 K/UL (ref 138–453)
PMV BLD AUTO: 10.2 FL (ref 8.1–13.5)
POTASSIUM SERPL-SCNC: 4 MMOL/L (ref 3.7–5.3)
PROT SERPL-MCNC: 7.2 G/DL (ref 6.4–8.3)
PROT UR STRIP-MCNC: NEGATIVE MG/DL
RBC # BLD AUTO: 4.35 M/UL (ref 3.95–5.11)
RBC # BLD: ABNORMAL 10*6/UL
SODIUM SERPL-SCNC: 139 MMOL/L (ref 135–144)
SP GR UR STRIP: 1.01 (ref 1–1.03)
UROBILINOGEN UR STRIP-ACNC: NORMAL EU/DL (ref 0–1)
WBC OTHER # BLD: 5.6 K/UL (ref 3.5–11.3)

## 2024-10-22 PROCEDURE — 85025 COMPLETE CBC W/AUTO DIFF WBC: CPT

## 2024-10-22 PROCEDURE — 81003 URINALYSIS AUTO W/O SCOPE: CPT

## 2024-10-22 PROCEDURE — 86901 BLOOD TYPING SEROLOGIC RH(D): CPT

## 2024-10-22 PROCEDURE — 36415 COLL VENOUS BLD VENIPUNCTURE: CPT

## 2024-10-22 PROCEDURE — 86900 BLOOD TYPING SEROLOGIC ABO: CPT

## 2024-10-22 PROCEDURE — 85652 RBC SED RATE AUTOMATED: CPT

## 2024-10-22 PROCEDURE — 86850 RBC ANTIBODY SCREEN: CPT

## 2024-10-22 PROCEDURE — 87641 MR-STAPH DNA AMP PROBE: CPT

## 2024-10-22 PROCEDURE — 80053 COMPREHEN METABOLIC PANEL: CPT

## 2024-10-22 RX ORDER — ACETAMINOPHEN 500 MG
1000 TABLET ORAL ONCE
OUTPATIENT
Start: 2024-11-12

## 2024-10-22 RX ORDER — GABAPENTIN 300 MG/1
300 CAPSULE ORAL ONCE
OUTPATIENT
Start: 2024-11-12

## 2024-10-22 RX ORDER — CELECOXIB 200 MG/1
200 CAPSULE ORAL ONCE
OUTPATIENT
Start: 2024-11-12

## 2024-10-22 ASSESSMENT — PAIN DESCRIPTION - LOCATION: LOCATION: KNEE

## 2024-10-22 ASSESSMENT — PAIN SCALES - GENERAL: PAINLEVEL_OUTOF10: 4

## 2024-10-22 ASSESSMENT — PAIN DESCRIPTION - DESCRIPTORS: DESCRIPTORS: ACHING

## 2024-10-22 ASSESSMENT — PAIN DESCRIPTION - ORIENTATION: ORIENTATION: LEFT;RIGHT

## 2024-10-22 NOTE — PRE-PROCEDURE INSTRUCTIONS
On the Day of Your Surgery, Tuesday, 11/12/24, Please Arrive At 0830 AM     Enter the hospital through the Main Entrance, take the lobby elevators to the second floor and check in at the Surgery Registration desk.     Continue to take your home medications as you normally do up to and including the night before surgery with the exception of blood thinning medications.    Blood Thinning Medications:  Please stop prescription blood thinning medications such as Apixaban (Eliquis); Clopidogrel (Plavix); Dabigatran (Pradaxa); Prasugrel (Effient); Rivaroxaban (Xarelto); Ticagrelor (Brilinta); Warfarin (Coumadin) only as directed by your surgeon and/or the prescribing physician    Some common examples of other medications that can thin your blood are: Aspirin, Ibuprofen (Advil, Motrin), Naproxen (Aleve), Meloxicam (Mobic), Celecoxib (Celebrex), Fish Oil, many Herbal Supplements.  These medications should usually be stopped at least 7 days prior to surgery.    Multivitamin     Tylenol is OK to take for pain the week prior to surgery.    Failure to stop certain medications may interfere with your scheduled surgery.    If you receive instructions from your surgeon regarding what medications to stop prior to surgery, please follow those specific instructions.    Please take the following medication(s) the day of surgery with small sips of water:   Amlodipine, omeprazole (if needed).    If you are on medicare and there is a possibility that you will be admitted following surgery:   Please bring your prescription medications in their original bottles with pharmacy label on the day of surgery.    Showering Before Surgery:     You can play an important role in your own health by carefully washing before surgery. Shower the night before and the morning of surgery using the instructions below. If you are allergic to Chlorhexidine Gluconate (CHG) use antibacterial soap instead.    If you were given Chlorhexidine soap, please follow

## 2024-10-23 LAB
MRSA, DNA, NASAL: NEGATIVE
SPECIMEN DESCRIPTION: NORMAL

## 2024-10-30 ENCOUNTER — OFFICE VISIT (OUTPATIENT)
Dept: INTERNAL MEDICINE | Age: 64
End: 2024-10-30
Payer: COMMERCIAL

## 2024-10-30 VITALS
HEIGHT: 66 IN | TEMPERATURE: 97.8 F | BODY MASS INDEX: 27.97 KG/M2 | DIASTOLIC BLOOD PRESSURE: 81 MMHG | OXYGEN SATURATION: 98 % | WEIGHT: 174 LBS | HEART RATE: 77 BPM | SYSTOLIC BLOOD PRESSURE: 135 MMHG

## 2024-10-30 DIAGNOSIS — Z01.818 PRE-OPERATIVE CLEARANCE: Primary | ICD-10-CM

## 2024-10-30 PROCEDURE — 99211 OFF/OP EST MAY X REQ PHY/QHP: CPT | Performed by: INTERNAL MEDICINE

## 2024-10-30 SDOH — ECONOMIC STABILITY: FOOD INSECURITY: WITHIN THE PAST 12 MONTHS, YOU WORRIED THAT YOUR FOOD WOULD RUN OUT BEFORE YOU GOT MONEY TO BUY MORE.: NEVER TRUE

## 2024-10-30 SDOH — ECONOMIC STABILITY: FOOD INSECURITY: WITHIN THE PAST 12 MONTHS, THE FOOD YOU BOUGHT JUST DIDN'T LAST AND YOU DIDN'T HAVE MONEY TO GET MORE.: NEVER TRUE

## 2024-10-30 SDOH — ECONOMIC STABILITY: INCOME INSECURITY: HOW HARD IS IT FOR YOU TO PAY FOR THE VERY BASICS LIKE FOOD, HOUSING, MEDICAL CARE, AND HEATING?: NOT HARD AT ALL

## 2024-10-30 ASSESSMENT — PATIENT HEALTH QUESTIONNAIRE - PHQ9
2. FEELING DOWN, DEPRESSED OR HOPELESS: NOT AT ALL
8. MOVING OR SPEAKING SO SLOWLY THAT OTHER PEOPLE COULD HAVE NOTICED. OR THE OPPOSITE, BEING SO FIGETY OR RESTLESS THAT YOU HAVE BEEN MOVING AROUND A LOT MORE THAN USUAL: NOT AT ALL
3. TROUBLE FALLING OR STAYING ASLEEP: SEVERAL DAYS
SUM OF ALL RESPONSES TO PHQ QUESTIONS 1-9: 2
9. THOUGHTS THAT YOU WOULD BE BETTER OFF DEAD, OR OF HURTING YOURSELF: NOT AT ALL
SUM OF ALL RESPONSES TO PHQ9 QUESTIONS 1 & 2: 0
5. POOR APPETITE OR OVEREATING: NOT AT ALL
4. FEELING TIRED OR HAVING LITTLE ENERGY: SEVERAL DAYS
1. LITTLE INTEREST OR PLEASURE IN DOING THINGS: NOT AT ALL
SUM OF ALL RESPONSES TO PHQ QUESTIONS 1-9: 2
6. FEELING BAD ABOUT YOURSELF - OR THAT YOU ARE A FAILURE OR HAVE LET YOURSELF OR YOUR FAMILY DOWN: NOT AT ALL
7. TROUBLE CONCENTRATING ON THINGS, SUCH AS READING THE NEWSPAPER OR WATCHING TELEVISION: NOT AT ALL
10. IF YOU CHECKED OFF ANY PROBLEMS, HOW DIFFICULT HAVE THESE PROBLEMS MADE IT FOR YOU TO DO YOUR WORK, TAKE CARE OF THINGS AT HOME, OR GET ALONG WITH OTHER PEOPLE: NOT DIFFICULT AT ALL
SUM OF ALL RESPONSES TO PHQ QUESTIONS 1-9: 2
SUM OF ALL RESPONSES TO PHQ QUESTIONS 1-9: 2

## 2024-10-30 ASSESSMENT — ENCOUNTER SYMPTOMS
CHEST TIGHTNESS: 0
APNEA: 0
BACK PAIN: 0

## 2024-10-30 NOTE — PROGRESS NOTES
[facility]  [facility]  [address] [place] [address]  Dept: [phone]  Dept Fax: [phone]    Office Progress/Follow Up Note  Date ofpatient's visit: 10/30/2024  Patient's Name:  Felicita Ashraf YOB: 1960            Patient Care Team:  Paulette Neumann MD as PCP - General (Internal Medicine)  Paulette Neumann MD as PCP - Empaneled Provider  Goldberger, Edward, MD as Consulting Physician (Rheumatology)  Bakari Coleman MD as Anesthesiologist (Pain Management)  Nikunj Pagan MD (Orthopedic Surgery)  Gurpreet Tineo MD as Surgeon (Orthopedic Surgery)  Rebel Man MD as Anesthesiologist (Pain Management)  ================================================================    REASON FOR VISIT/CHIEF COMPLAINT:  Surgical Clearance (Pt feeling good today)    HISTORY OF PRESENTING ILLNESS:  History was obtained from: patient, electronic medical record. Felicita Brar a 64 y.o. is here for a same-day visit for preoperative clearance for left knee arthroplasty that is scheduled on 11/12/2024.  Patient was evaluated today for this preoperative clearance.  Patient had no other chief complaint and has been feeling really well without any other acute issues going on.  She has no other chief complaint for today's visit.  Apparently she is doing well and compliant with all her medication.  She is excited for the upcoming orthopedics procedure on 11 November for total left knee arthroplasty.  Patient have orthopedic surgery before.  Patient have no issue with mobilization and can go up and down the stairs without getting short of breath.  Patient does not have history of any diabetes mellitus and at baseline does not take any insulin.  Patient's last creatinine was 0.7.  On my evaluation today, patient's blood pressure was 135/81 and heart rate was 77.      Patient Active Problem List   Diagnosis    Osteoarthritis of both knees    Osteoarthritis of both shoulders

## 2024-10-31 NOTE — PROGRESS NOTES
Attending Physician Statement  I have discussed the care of Felicita Ashraf, including pertinent history and exam findings with the resident. I have reviewed the key elements of all parts of the encounter with the resident.  agree with the assessment, and status of the problem list as documented.    Diagnosis Orders   1. Pre-operative clearance          The plan and orders should include No orders of the defined types were placed in this encounter.   and this was also documented by the resident.      Dr Yuliya Shah MD, FACP  [facility]ate , Internal Medicine Residency Program  [facility] , [facility]  Chair, Department of Internal Medicine  [facility]         10/31/2024, 12:42 PM

## 2025-02-18 DIAGNOSIS — I10 ESSENTIAL HYPERTENSION: ICD-10-CM

## 2025-02-18 DIAGNOSIS — E79.0 HYPERURICEMIA: ICD-10-CM

## 2025-02-18 NOTE — TELEPHONE ENCOUNTER
..Request for   Requested Prescriptions     Pending Prescriptions Disp Refills    amLODIPine (NORVASC) 10 MG tablet 90 tablet 1     Sig: Take 1 tablet by mouth daily    colchicine (COLCRYS) 0.6 MG tablet 90 tablet 1     Sig: Take 1 tablet by mouth daily    .      Please review and e-scribe to pharmacy listed in chart if appropriate. Thank you.      Last Visit Date: 10/30/2024  Next Visit Date: 3/4/2025    Future Appointments   Date Time Provider Department Center   3/4/2025 11:00 AM Paulette Neumann MD [facility] DEP       Health Maintenance   Topic Date Due    HIV screen  Never done    Hepatitis C screen  Never done    DTaP/Tdap/Td vaccine (1 - Tdap) Never done    Pneumococcal 50+ years Vaccine (1 of 2 - PCV) Never done    Shingles vaccine (1 of 2) Never done    Respiratory Syncytial Virus (RSV) Pregnant or age 60 yrs+ (1 - Risk 60-74 years 1-dose series) Never done    Breast cancer screen  05/08/2023    Flu vaccine (1) Never done    COVID-19 Vaccine (3 - 2024-25 season) 09/01/2024    A1C test (Diabetic or Prediabetic)  06/28/2025    Depression Monitoring  10/30/2025    Lipids  06/28/2029    Colorectal Cancer Screen  08/25/2033    Hepatitis A vaccine  Aged Out    Hepatitis B vaccine  Aged Out    Hib vaccine  Aged Out    Polio vaccine  Aged Out    Meningococcal (ACWY) vaccine  Aged Out    Diabetes screen  Discontinued       Hemoglobin A1C (%)   Date Value   06/28/2024 6.0   07/06/2023 6.2 (H)   09/20/2022 5.9             ( goal A1C is < 7)   No components found for: \"LABMICR\"  No components found for: \"LDLCHOLESTEROL\", \"LDLCALC\"    (goal LDL is <100)   AST (U/L)   Date Value   10/22/2024 17     ALT (U/L)   Date Value   10/22/2024 13     BUN (mg/dL)   Date Value   10/22/2024 10     BP Readings from Last 3 Encounters:   10/30/24 135/81   10/22/24 136/76   05/07/24 130/79          (goal 120/80)    All Future Testing planned in [facility]  Lab Frequency Next Occurrence         Patient Active Problem List:

## 2025-02-20 RX ORDER — AMLODIPINE BESYLATE 10 MG/1
10 TABLET ORAL DAILY
Qty: 30 TABLET | Refills: 1 | Status: SHIPPED | OUTPATIENT
Start: 2025-02-20

## 2025-02-20 RX ORDER — COLCHICINE 0.6 MG/1
0.6 TABLET ORAL DAILY
Qty: 30 TABLET | Refills: 0 | Status: SHIPPED | OUTPATIENT
Start: 2025-02-20

## 2025-03-04 ENCOUNTER — OFFICE VISIT (OUTPATIENT)
Dept: INTERNAL MEDICINE | Age: 65
End: 2025-03-04
Payer: MEDICARE

## 2025-03-04 VITALS
DIASTOLIC BLOOD PRESSURE: 87 MMHG | SYSTOLIC BLOOD PRESSURE: 132 MMHG | HEIGHT: 66 IN | TEMPERATURE: 97.3 F | WEIGHT: 173 LBS | BODY MASS INDEX: 27.8 KG/M2 | HEART RATE: 92 BPM | OXYGEN SATURATION: 99 %

## 2025-03-04 DIAGNOSIS — R73.02 IGT (IMPAIRED GLUCOSE TOLERANCE): ICD-10-CM

## 2025-03-04 DIAGNOSIS — M25.531 CHRONIC PAIN OF BOTH WRISTS: ICD-10-CM

## 2025-03-04 DIAGNOSIS — F33.1 MODERATE EPISODE OF RECURRENT MAJOR DEPRESSIVE DISORDER (HCC): ICD-10-CM

## 2025-03-04 DIAGNOSIS — I10 ESSENTIAL HYPERTENSION: Primary | ICD-10-CM

## 2025-03-04 DIAGNOSIS — E79.0 HYPERURICEMIA: ICD-10-CM

## 2025-03-04 DIAGNOSIS — M25.532 CHRONIC PAIN OF BOTH WRISTS: ICD-10-CM

## 2025-03-04 DIAGNOSIS — G89.29 CHRONIC FOOT PAIN, LEFT: ICD-10-CM

## 2025-03-04 DIAGNOSIS — I50.32 CHRONIC DIASTOLIC CONGESTIVE HEART FAILURE (HCC): ICD-10-CM

## 2025-03-04 DIAGNOSIS — M79.672 CHRONIC FOOT PAIN, LEFT: ICD-10-CM

## 2025-03-04 DIAGNOSIS — E78.00 PURE HYPERCHOLESTEROLEMIA: ICD-10-CM

## 2025-03-04 DIAGNOSIS — G89.29 CHRONIC PAIN OF BOTH WRISTS: ICD-10-CM

## 2025-03-04 PROCEDURE — 3079F DIAST BP 80-89 MM HG: CPT | Performed by: INTERNAL MEDICINE

## 2025-03-04 PROCEDURE — 4004F PT TOBACCO SCREEN RCVD TLK: CPT | Performed by: INTERNAL MEDICINE

## 2025-03-04 PROCEDURE — 3017F COLORECTAL CA SCREEN DOC REV: CPT | Performed by: INTERNAL MEDICINE

## 2025-03-04 PROCEDURE — G8427 DOCREV CUR MEDS BY ELIG CLIN: HCPCS | Performed by: INTERNAL MEDICINE

## 2025-03-04 PROCEDURE — G8419 CALC BMI OUT NRM PARAM NOF/U: HCPCS | Performed by: INTERNAL MEDICINE

## 2025-03-04 PROCEDURE — 99214 OFFICE O/P EST MOD 30 MIN: CPT | Performed by: INTERNAL MEDICINE

## 2025-03-04 PROCEDURE — 3075F SYST BP GE 130 - 139MM HG: CPT | Performed by: INTERNAL MEDICINE

## 2025-03-04 RX ORDER — AMLODIPINE BESYLATE 10 MG/1
10 TABLET ORAL DAILY
Qty: 30 TABLET | Refills: 5 | Status: SHIPPED | OUTPATIENT
Start: 2025-03-04

## 2025-03-04 RX ORDER — COLCHICINE 0.6 MG/1
0.6 TABLET ORAL DAILY
Qty: 30 TABLET | Refills: 5 | Status: SHIPPED | OUTPATIENT
Start: 2025-03-04

## 2025-03-04 RX ORDER — M-VIT,TX,IRON,MINS/CALC/FOLIC 27MG-0.4MG
1 TABLET ORAL DAILY
Qty: 30 TABLET | Refills: 5 | Status: SHIPPED | OUTPATIENT
Start: 2025-03-04

## 2025-03-04 RX ORDER — PREDNISONE 20 MG/1
20 TABLET ORAL 2 TIMES DAILY
Qty: 10 TABLET | Refills: 0 | Status: SHIPPED | OUTPATIENT
Start: 2025-03-04 | End: 2025-03-09

## 2025-03-04 SDOH — ECONOMIC STABILITY: FOOD INSECURITY: WITHIN THE PAST 12 MONTHS, YOU WORRIED THAT YOUR FOOD WOULD RUN OUT BEFORE YOU GOT MONEY TO BUY MORE.: NEVER TRUE

## 2025-03-04 SDOH — ECONOMIC STABILITY: FOOD INSECURITY: WITHIN THE PAST 12 MONTHS, THE FOOD YOU BOUGHT JUST DIDN'T LAST AND YOU DIDN'T HAVE MONEY TO GET MORE.: NEVER TRUE

## 2025-03-04 ASSESSMENT — ENCOUNTER SYMPTOMS
SHORTNESS OF BREATH: 0
COUGH: 0
BLOOD IN STOOL: 0
CONSTIPATION: 0
DIARRHEA: 0
WHEEZING: 0
BACK PAIN: 1
ABDOMINAL PAIN: 0

## 2025-03-04 ASSESSMENT — PATIENT HEALTH QUESTIONNAIRE - PHQ9
9. THOUGHTS THAT YOU WOULD BE BETTER OFF DEAD, OR OF HURTING YOURSELF: NOT AT ALL
SUM OF ALL RESPONSES TO PHQ QUESTIONS 1-9: 0
10. IF YOU CHECKED OFF ANY PROBLEMS, HOW DIFFICULT HAVE THESE PROBLEMS MADE IT FOR YOU TO DO YOUR WORK, TAKE CARE OF THINGS AT HOME, OR GET ALONG WITH OTHER PEOPLE: NOT DIFFICULT AT ALL
6. FEELING BAD ABOUT YOURSELF - OR THAT YOU ARE A FAILURE OR HAVE LET YOURSELF OR YOUR FAMILY DOWN: NOT AT ALL
SUM OF ALL RESPONSES TO PHQ QUESTIONS 1-9: 0
8. MOVING OR SPEAKING SO SLOWLY THAT OTHER PEOPLE COULD HAVE NOTICED. OR THE OPPOSITE, BEING SO FIGETY OR RESTLESS THAT YOU HAVE BEEN MOVING AROUND A LOT MORE THAN USUAL: NOT AT ALL
2. FEELING DOWN, DEPRESSED OR HOPELESS: NOT AT ALL
SUM OF ALL RESPONSES TO PHQ QUESTIONS 1-9: 0
7. TROUBLE CONCENTRATING ON THINGS, SUCH AS READING THE NEWSPAPER OR WATCHING TELEVISION: NOT AT ALL
SUM OF ALL RESPONSES TO PHQ QUESTIONS 1-9: 0
1. LITTLE INTEREST OR PLEASURE IN DOING THINGS: NOT AT ALL
3. TROUBLE FALLING OR STAYING ASLEEP: NOT AT ALL
5. POOR APPETITE OR OVEREATING: NOT AT ALL
4. FEELING TIRED OR HAVING LITTLE ENERGY: NOT AT ALL

## 2025-03-04 NOTE — PROGRESS NOTES
[facility]    Progress Note    Date of patient's visit: 3/4/2025    Patient's Name:  Felicita Ashraf    YOB: 1960            Patient Care Team:  Paulette Neumann MD as PCP - General (Internal Medicine)  Paulette Neumann MD as PCP - Empaneled Provider  Goldberger, Edward, MD as Consulting Physician (Rheumatology)  Bakari Coleman MD as Anesthesiologist (Pain Management)  Nikunj Pagan MD (Orthopedic Surgery)  Gurpreet Tineo MD as Surgeon (Orthopedic Surgery)  Rebel Man MD as Anesthesiologist (Pain Management)    REASON FOR VISIT: Routine outpatient follow     Chief Complaint   Patient presents with    Edema     Pt c/o hand swelling. Asking for prednisone.     Hypertension     Follow up     Discuss Labs     Pt wanting results from October labs         HISTORY OF PRESENT ILLNESS:    History was obtained from the patient. Felicita Ashraf is a 64 y.o. is here for follow-up.  She continues to complain of increasing pain and swelling in her wrist, MCP joints and the base of her first thumb in both hands.  She says pain and stiffness is worse in the winter.  It last for several hours in the day.  She takes Percocet for pain from pain management does not help the stiffness or swelling.  Also has toe pain and her first MTP joint swells up sometimes.  She thinks it is gout flare and keeps asking for prednisone.  She does have a sister who has rheumatoid arthritis.  She was seen by rheumatology several years ago and at that time she was told she had fibromyalgia and other issues but no rheumatoid arthritis.  No skin rash.  No myalgias.  No edema.  She is not on a statin as she has refused it in spite of hyperlipidemia and ASCVD score approximately 18% the last time it was calculated.  She is here to follow-up on her labs.  She keeps refusing many tests including mammograms.  She did get her echo done.     She did get a colonoscopy last year at [facility].  Few

## 2025-03-30 NOTE — PROGRESS NOTES
EMERGENCY DEPARTMENT ENCOUNTER      Pt Name: Sruthi Cardenas  MRN: [id number]  Birthdate 1948  Date of evaluation: 3/30/2025  Provider: Greg Flannery MD    CHIEF COMPLAINT       Chief Complaint   Patient presents with    low HGB     Pt states she got a call this morning from her PCP who told her that her HGB was half what it should be and she needed to go to the ED. Pt denies SOB but states that she has been very fatigued.      HISTORY OF PRESENT ILLNESS    HPI  76-year-old female presents emergency department chief complaint of low hemoglobin.  Patient has a past medical history of anticoagulation on warfarin due to mitral valve replacement.  Patient claims that she was feeling fatigued so went to go get evaluated by her primary care provider and primary care provider ania labs which showed a low hemoglobin primary care provider was concerned called the patient and told her to get evaluated the emergency department urgently.  The patient came the emergency department to get evaluated.  The patient denies any dark or tarry stools however she does claim that she has bright red blood when she wipes.  She denies any abdominal pain nausea vomiting chest pain or shortness of breath.  Nursing Notes were reviewed.    PAST MEDICAL HISTORY   History reviewed. No pertinent past medical history.      SURGICAL HISTORY       Past Surgical History:   Procedure Laterality Date    AORTIC VALVE REPLACEMENT  06/18/2014    Aortic Valve Replacement    OTHER SURGICAL HISTORY  04/13/2015    Left Breast Biopsy During Breast Surgery    OTHER SURGICAL HISTORY  04/13/2015    Biopsy Breast Percutaneous Rotating Biopsy Device    OTHER SURGICAL HISTORY  04/13/2015    Right Breast Biopsy During Breast Surgery         CURRENT MEDICATIONS       Previous Medications    AZELASTINE (ASTELIN) 137 MCG (0.1 %) NASAL SPRAY    Administer 1 spray into each nostril 2 times a day. Use in each nostril as directed    BIOTIN 2,500 MCG CAPSULE    Take by  [facility]    Progress Note    Date of patient's visit: 2/11/2020    Patient's Name:  Radha Aguilar    YOB: 1960            Patient Care Team:  Jeffry Miles MD as PCP - General (Internal Medicine)  Jeffry Miles MD as PCP - [facility] Provider  Patricia Wilson MD as Consulting Physician (Rheumatology)  Katharina Coburn MD as Anesthesiologist (Pain Management)  Eladio Russ MD (Orthopedic Surgery)  Zenaida Juan MD as Surgeon (Orthopedic Surgery)  Griselda Schuler MD as Anesthesiologist (Pain Management)    REASON FOR VISIT: Routine outpatient follow     Chief Complaint   Patient presents with    Hypertension    Anxiety    Health Maintenance     vaccines declined, mammogram pended          HISTORY OF PRESENT ILLNESS:    History was obtained from the patient. Radha Aguilar is a 61 y.o. is here for low up. Hypertension is well controlled. She has a lot of dysphoric symptoms and somatic complaints. She is tearful. She continues to have problems with trust.  She went and saw a different orthopedic physician who did recommend left hip replacement but she is not ready yet. She plans to follow-up with him again. She is agreeable to seeing another counselor. She was seeing Dr. Camryn Ingram for depression and anxiety which was helping. She refuses treatment. She has in the past been to [facility] and other psychiatrists. Hip xrays 2019  Impression   Right hip:       1. Right hip arthroplasty hardware without significant periprosthetic lucency   or acute osseous abnormality. 2. Diffuse osteopenia. Left hip:       1. Moderate left hip osteoarthrosis.  No acute fracture or gross dislocation. Diffuse osteopenia. 2. Findings at the left femoral head could be related to underlying AVN.               Past Medical History:   Diagnosis Date    CAD (coronary artery disease)     pt denies, CARDIAC CATH O.K., STRESS O.K.    Depression 7/30/2013 mouth.    CALCIUM CARBONATE-VITAMIN D3 (CALCIUM 600 + D,3,) 600 MG-10 MCG (400 UNIT) TABLET    Take 1 tablet by mouth once daily.    CETIRIZINE (ZYRTEC) 10 MG TABLET    Take 1 tablet (10 mg) by mouth once daily.    CRANBERRY FRUIT (CRANBERRY) 450 MG TABLET    Take 1 capsule by mouth once daily.    DOFETILIDE (TIKOSYN) 125 MCG CAPSULE    Take 1 capsule (125 mcg) by mouth every 12 hours.    FERROUS SULFATE, DRIED (SLOW RELEASE IRON) 160 MG (50 MG IRON) ER TABLET    Take 1 tablet (47.5 mg) by mouth once daily.    FLUTICASONE (FLONASE) 50 MCG/ACTUATION NASAL SPRAY    1 spray by Does not apply route once daily.    GLUCOSAMINE-CHONDROITIN (OSTEO BI-FLEX) 250-200 MG TABLET    Take 1 tablet by mouth once daily.    MAGNESIUM OXIDE (MAG-OX) 400 MG TABLET    Take 1 tablet (400 mg) by mouth once daily.    METOPROLOL SUCCINATE XL (TOPROL XL) 50 MG 24 HR TABLET    Take 1 tablet (50 mg) by mouth once daily.    MOMETASONE (NASONEX) 50 MCG/ACTUATION NASAL SPRAY    Administer 2 sprays into affected nostril(s) once daily.    MULTIVITAMIN TABLET    Take 1 tablet by mouth once daily.    SPIRONOLACTONE (ALDACTONE) 25 MG TABLET    Take 0.5 tablets (12.5 mg) by mouth once daily.    WARFARIN (COUMADIN) 3 MG TABLET    Take 1.5 mg every Tue, Fri; 3 mg all other days or as directed by coumadin clinic       ALLERGIES     Patient has no known allergies.    FAMILY HISTORY       Family History   Problem Relation Name Age of Onset    Kidney failure Mother      Stroke Father      Breast cancer Sister            SOCIAL HISTORY       Social History     Socioeconomic History    Marital status:    Tobacco Use    Smoking status: Former     Current packs/day: 0.00     Types: Cigarettes     Quit date:      Years since quittin.2    Smokeless tobacco: Never   Vaping Use    Vaping status: Never Used   Substance and Sexual Activity    Alcohol use: Yes     Comment: rarely    Drug use: Never     Social Drivers of Health     Food Insecurity: No   Fibromyalgia     Full dentures     Hyperlipidemia 7/30/2013    Hypertension     Obesity 7/30/2013    Osteoarthritis     Pain management     se[facility]    Wears glasses        Past Surgical History:   Procedure Laterality Date    COLONOSCOPY      polyps removed    FOOT SURGERY  1968    HYSTERECTOMY      HYSTERECTOMY, TOTAL ABDOMINAL  1997    NERVE BLOCK  2/5/14    caudal #1  celestone 9mg    NERVE BLOCK  2/11/14    caudal #2  celestone 9mg    NERVE BLOCK  02/18/14    caudal# 3, celestone 9 mg    OTHER SURGICAL HISTORY      SPINAL ENDOSCOPY X3    SHOULDER ARTHROSCOPY Left 06/13/15    SHOULDER SURGERY  Right; 2011    SPINE SURGERY  2009    TOTAL HIP ARTHROPLASTY Right 10/12/2016         ALLERGIES      Allergies   Allergen Reactions    Asa [Aspirin] Nausea Only    Eggs Or Egg-Derived Products     Ibuprofen Nausea Only    Neurontin [Gabapentin] Nausea Only    Shellfish-Derived Products     Shrimp Flavor      TROUBLE BREATHING    Tape [Adhesive Tape]        MEDICATIONS:      Current Outpatient Medications on File Prior to Visit   Medication Sig Dispense Refill    colchicine (COLCRYS) 0.6 MG tablet take 1 tablet by mouth once daily 30 tablet 1    omeprazole (PRILOSEC) 20 MG delayed release capsule take 1 capsule by mouth once daily 30-60 MINUTES BEFORE THE FIRST MEAL OF THE DAY 30 capsule 3    amLODIPine (NORVASC) 10 MG tablet Take 1 tablet by mouth daily 30 tablet 6    Multiple Vitamins-Minerals (THERAPEUTIC MULTIVITAMIN-MINERALS) tablet Take 1 tablet by mouth daily      acetaminophen (TYLENOL) 325 MG tablet Take 2 tablets by mouth as needed for Pain 90 tablet 1     No current facility-administered medications on file prior to visit. SOCIAL HISTORY    Reviewed and no change from previous record. Haider Avelar  reports that she has been smoking cigarettes and e-cigarettes. She has a 6.25 pack-year smoking history.  She has never used smokeless tobacco.    FAMILY HISTORY: Food Insecurity (4/24/2024)    Received from [facility]    Hunger Vital Sign     Worried About Running Out of Food in the Last Year: Never true     Ran Out of Food in the Last Year: Never true   Transportation Needs: No Transportation Needs (4/24/2024)    Received from [facility]    PRAPARE - Transportation     Lack of Transportation (Medical): No     Lack of Transportation (Non-Medical): No   Physical Activity: Sufficiently Active (4/24/2024)    Received from [facility]    Exercise Vital Sign     Days of Exercise per Week: 7 days     Minutes of Exercise per Session: 60 min   Stress: No Stress Concern Present (4/24/2024)    Received from [facility]    [employer] [place] [employer] - Occupational Stress Questionnaire     Feeling of Stress : Not at all   Social Connections: Socially Isolated (4/24/2024)    Received from [facility]    Social Connection and Isolation Panel [NHANES]     Frequency of Communication with Friends and Family: More than three times a week     Frequency of Social Gatherings with Friends and Family: Once a week     Attends Mu-ism Services: Never     Active Member of Clubs or Organizations: No     Marital Status:    Housing Stability: Low Risk  (4/24/2024)    Received from [facility]    Housing Stability Vital Sign     Unable to Pay for Housing in the Last Year: No     Number of Places Lived in the Last Year: 1     Unstable Housing in the Last Year: No       SCREENINGS                        PHYSICAL EXAM    (up to 7 for level 4, 8 or more for level 5)     ED Triage Vitals [03/30/25 1310]   Temperature Heart Rate Respirations BP   36.7 °C (98.1 °F) 82 18 160/69      Pulse Ox Temp Source Heart Rate Source Patient Position   99 % Temporal Monitor Sitting      BP Location FiO2 (%)     Right arm --       Physical Exam  Constitutional:   Reviewed and No change from previous visit    HEALTH MAINTENANCE DUE:      Health Maintenance Due   Topic Date Due    Pneumococcal 0-64 years Vaccine (1 of 1 - PPSV23) 06/27/1966    DTaP/Tdap/Td vaccine (1 - Tdap) 06/27/1971    Shingles Vaccine (1 of 2) 06/27/2010    Potassium monitoring  10/06/2018    Creatinine monitoring  10/06/2018    Annual Wellness Visit (AWV)  06/04/2019    Flu vaccine (1) 09/01/2019    Breast cancer screen  01/18/2020       REVIEW OF SYSTEMS:    12 point review of symptoms completed and found to be normal except noted in the HPI    Review of Systems   Constitutional: Positive for fatigue and unexpected weight change. Negative for chills and fever. HENT: Positive for congestion and ear pain. Negative for sinus pressure and sinus pain. Respiratory: Negative for cough, shortness of breath and wheezing. Cardiovascular: Negative for chest pain, palpitations and leg swelling. Gastrointestinal: Negative for abdominal pain, blood in stool, constipation, diarrhea and nausea. Endocrine: Negative for polydipsia and polyuria. Genitourinary: Negative for dysuria, frequency and hematuria. Musculoskeletal: Positive for arthralgias and back pain. Negative for gait problem and joint swelling. Neurological: Negative for dizziness, syncope, weakness, numbness and headaches. Hematological: Negative for adenopathy. Does not bruise/bleed easily. Psychiatric/Behavioral: Positive for dysphoric mood and sleep disturbance. The patient is nervous/anxious.          PHYSICAL EXAM:     Vitals:    02/11/20 1115   BP: 132/83   Site: Left Upper Arm   Position: Sitting   Cuff Size: Medium Adult   Pulse: 88   Weight: 170 lb (77.1 kg)     Body mass index is 27.44 kg/m².     BP Readings from Last 3 Encounters:   02/11/20 132/83   09/03/19 133/82   06/04/19 (!) 139/90        Wt Readings from Last 3 Encounters:   02/11/20 170 lb (77.1 kg)   09/03/19 171 lb (77.6 kg)   06/04/19 174 lb (78.9 kg)      Appearance: Normal appearance.   HENT:      Head: Normocephalic and atraumatic.      Nose: Nose normal.      Mouth/Throat:      Mouth: Mucous membranes are moist.      Pharynx: Oropharynx is clear.   Eyes:      Extraocular Movements: Extraocular movements intact.      Pupils: Pupils are equal, round, and reactive to light.   Cardiovascular:      Rate and Rhythm: Normal rate and regular rhythm.      Pulses: Normal pulses.      Heart sounds: Normal heart sounds.   Pulmonary:      Effort: Pulmonary effort is normal.      Breath sounds: Normal breath sounds.   Abdominal:      General: Abdomen is flat.   Musculoskeletal:         General: Normal range of motion.   Skin:     Capillary Refill: Capillary refill takes less than 2 seconds.      Coloration: Skin is pale.   Neurological:      General: No focal deficit present.      Mental Status: She is alert.   Psychiatric:         Mood and Affect: Mood normal.          DIAGNOSTIC RESULTS     LABS:  Labs Reviewed   CBC WITH AUTO DIFFERENTIAL - Abnormal       Result Value    WBC 4.9      nRBC 0.0      RBC 2.27 (*)     Hemoglobin 5.5 (*)     Hematocrit 20.2 (*)     MCV 89      MCH 24.2 (*)     MCHC 27.2 (*)     RDW 19.2 (*)     Platelets 332      Neutrophils % 67.9      Immature Granulocytes %, Automated 0.6      Lymphocytes % 17.9      Monocytes % 10.1      Eosinophils % 2.5      Basophils % 1.0      Neutrophils Absolute 3.30      Immature Granulocytes Absolute, Automated 0.03      Lymphocytes Absolute 0.87      Monocytes Absolute 0.49      Eosinophils Absolute 0.12      Basophils Absolute 0.05     COMPREHENSIVE METABOLIC PANEL - Abnormal    Glucose 141 (*)     Sodium 139      Potassium 4.1      Chloride 106      Bicarbonate 25      Anion Gap 12      Urea Nitrogen 26 (*)     Creatinine 0.90      eGFR 66      Calcium 9.0      Albumin 3.7      Alkaline Phosphatase 55      Total Protein 6.6      AST 17      Bilirubin, Total 0.3      ALT 9     PROTIME-INR - Abnormal    Protime  33.9 (*)     INR 3.1 (*)    IRON AND TIBC - Abnormal    Iron 11 (*)     UIBC 365      TIBC 376      % Saturation 3 (*)    MAGNESIUM - Normal    Magnesium 2.09     APTT - Normal    aPTT 35      Narrative:     The APTT is no longer used for monitoring Unfractionated Heparin Therapy. For monitoring Heparin Therapy, use the Heparin Assay.   TYPE AND SCREEN    ABO TYPE O      Rh TYPE POS      ANTIBODY SCREEN NEG     VERAB/VERIFY ABORH    ABO TYPE O      Rh TYPE POS     VITAMIN B12   FOLATE   TSH WITH REFLEX TO FREE T4 IF ABNORMAL   PREPARE RBC    PRODUCT CODE S1246Y50      Unit Number [id number]      Unit ABO O      Unit RH POS      XM INTEP COMP      Dispense Status XM      Blood Expiration Date 4/19/2025 11:59:00 PM EDT      PRODUCT BLOOD TYPE 5100      UNIT VOLUME 350      PRODUCT CODE T3786F64      Unit Number [id number]      Unit ABO O      Unit RH POS      XM INTEP COMP      Dispense Status XM      Blood Expiration Date 4/17/2025 11:59:00 PM EDT      PRODUCT BLOOD TYPE 5100      UNIT VOLUME 500      PRODUCT CODE O7631Y89      Unit Number [id number]      Unit ABO O      Unit RH POS      XM INTEP COMP      Dispense Status XM      Blood Expiration Date 4/17/2025 11:59:00 PM EDT      PRODUCT BLOOD TYPE 5100      UNIT VOLUME 350      PRODUCT CODE L0766L59      Unit Number [id number]      Unit ABO O      Unit RH POS      XM INTEP COMP      Dispense Status XM      Blood Expiration Date 4/19/2025 11:59:00 PM EDT      PRODUCT BLOOD TYPE 5100      UNIT VOLUME 350     MORPHOLOGY    RBC Morphology See Below      Polychromasia Mild      Hypochromia Mild      Giant Platelets Few         All other labs were within normal range or not returned as of this dictation.    Imaging  CT abdomen pelvis w IV contrast    (Results Pending)        Procedures  Procedures     EMERGENCY DEPARTMENT COURSE/MDM:   Medical Decision Making  76-year-old female presents emergency department chief complaint of low hemoglobin.   hypercholesterolemia  Labs pending      4. Primary osteoarthritis of both hips  Follow up with Orthopedics    5. Chronic rhinitis  flonase     6. Moderate episode of recurrent major depressive disorder [facility])    - External Referral To Psychology    7. Breast cancer screening by mammogram    - KARO DIGITAL SCREEN W CAD BILATERAL; Future    8. Anxiety    - External Referral To Psychology          FOLLOW UP AND INSTRUCTIONS:   Return in about 6 months (around 8/11/2020). Dirk Benjamin received counseling on the following healthy behaviors: nutrition, exercise and medication adherence    Reviewed prior labs and health maintenance. Discussed use, benefit, and side effects of prescribed medications. Barriers to medication compliance addressed. All patient questions answered. Pt voiced understanding.      Patient given educational materials - see patient instructions    Hailey Iyer  Attending Physician, [facility], Internal Medicine Residency Program  [address] [place] [address]  2/11/2020, 11:36 AM Medical management treatment emergency department will be to evaluate the patient's hemoglobin levels here.  Patient's hemoglobin is 5.5.  This is a significant drop from her baseline which chart review showed was approximately 11 to 13.  The patient is currently therapeutic for her warfarin.  No need to anticoagulate.  The patient denies any shortness of breath or chest pain.  Low concern for ACS or very embolism.  The patient will be transfused 2 units here in the emergency department.  We have also ordered 2 other units to hold.  Notation was had with the admitting team indicated they would like a CT of the abdomen pelvis they claim they will follow-up on this.  The patient will be admitted to the hospital service for continued observation and management.    ED Course as of 03/30/25 1532   Sun Mar 30, 2025   1446 Hemoglobin 5.5.  Based on chart review baseline is anywhere from 11-13 for this patient.  She has a nontender abdomen.  Hemodynamically stable.  Will plan to transfuse 2 units to get her hemoglobin greater than 7.  She is anticoagulated with Coumadin for history of cardiac valve replacement.  4 units to be prepared should patient have any decompensation or need for further transfusions while hospitalized. [TL]   1508 INR is therapeutic.  Given no sign of hemorrhagic shock and patient is otherwise hemodynamically stable with no active signs of bleeding and this been ongoing for several weeks I do not believe that emergent reversal is indicated.  Although was considered.  Will plan for admission. [TL]      ED Course User Index  [TL] Jon Florez DO         Diagnoses as of 03/30/25 1532   Acute lower gastrointestinal hemorrhage   Blood loss anemia        Patient and or family in agreement and understanding of treatment plan.  All questions answered.      I reviewed the case with the attending ED physician. The attending ED physician agrees with the plan. Patient and/or patient´s representative was  counseled regarding labs, imaging, likely diagnosis, and plan. All questions were answered.    ED Medications administered this visit:    Medications   pantoprazole (ProtoNix) EC tablet 40 mg (has no administration in time range)       New Prescriptions from this visit:    New Prescriptions    No medications on file       Follow-up:  No follow-up provider specified.      Final Impression:   1. Acute lower gastrointestinal hemorrhage    2. Blood loss anemia          (Please note that portions of this note were completed with a voice recognition program.  Efforts were made to edit the dictations but occasionally words are mis-transcribed.)     Greg Flannery MD  Resident  03/30/25 2536

## 2025-04-16 ENCOUNTER — OFFICE VISIT (OUTPATIENT)
Dept: INTERNAL MEDICINE | Age: 65
End: 2025-04-16
Payer: MEDICARE

## 2025-04-16 VITALS
HEART RATE: 96 BPM | BODY MASS INDEX: 27.72 KG/M2 | WEIGHT: 172.5 LBS | OXYGEN SATURATION: 98 % | HEIGHT: 66 IN | SYSTOLIC BLOOD PRESSURE: 134 MMHG | DIASTOLIC BLOOD PRESSURE: 78 MMHG

## 2025-04-16 DIAGNOSIS — Z11.4 ENCOUNTER FOR SCREENING FOR HIV: ICD-10-CM

## 2025-04-16 DIAGNOSIS — M79.672 CHRONIC FOOT PAIN, LEFT: ICD-10-CM

## 2025-04-16 DIAGNOSIS — F33.1 MODERATE EPISODE OF RECURRENT MAJOR DEPRESSIVE DISORDER (HCC): ICD-10-CM

## 2025-04-16 DIAGNOSIS — G89.29 CHRONIC PAIN OF BOTH WRISTS: ICD-10-CM

## 2025-04-16 DIAGNOSIS — Z12.31 BREAST CANCER SCREENING BY MAMMOGRAM: ICD-10-CM

## 2025-04-16 DIAGNOSIS — K21.9 GASTROESOPHAGEAL REFLUX DISEASE WITHOUT ESOPHAGITIS: ICD-10-CM

## 2025-04-16 DIAGNOSIS — M16.0 PRIMARY OSTEOARTHRITIS OF BOTH HIPS: ICD-10-CM

## 2025-04-16 DIAGNOSIS — E78.00 PURE HYPERCHOLESTEROLEMIA: ICD-10-CM

## 2025-04-16 DIAGNOSIS — Z11.59 ENCOUNTER FOR HEPATITIS C SCREENING TEST FOR LOW RISK PATIENT: ICD-10-CM

## 2025-04-16 DIAGNOSIS — M96.1 POSTLAMINECTOMY SYNDROME, LUMBAR REGION: ICD-10-CM

## 2025-04-16 DIAGNOSIS — I10 ESSENTIAL HYPERTENSION: Primary | ICD-10-CM

## 2025-04-16 DIAGNOSIS — Z12.11 SCREEN FOR COLON CANCER: ICD-10-CM

## 2025-04-16 DIAGNOSIS — F17.200 SMOKER: ICD-10-CM

## 2025-04-16 DIAGNOSIS — M25.531 CHRONIC PAIN OF BOTH WRISTS: ICD-10-CM

## 2025-04-16 DIAGNOSIS — G89.29 CHRONIC FOOT PAIN, LEFT: ICD-10-CM

## 2025-04-16 DIAGNOSIS — M25.532 CHRONIC PAIN OF BOTH WRISTS: ICD-10-CM

## 2025-04-16 DIAGNOSIS — I50.32 CHRONIC DIASTOLIC CONGESTIVE HEART FAILURE (HCC): ICD-10-CM

## 2025-04-16 PROCEDURE — 3017F COLORECTAL CA SCREEN DOC REV: CPT | Performed by: HOSPITALIST

## 2025-04-16 PROCEDURE — 3075F SYST BP GE 130 - 139MM HG: CPT | Performed by: HOSPITALIST

## 2025-04-16 PROCEDURE — 99214 OFFICE O/P EST MOD 30 MIN: CPT | Performed by: HOSPITALIST

## 2025-04-16 PROCEDURE — 4004F PT TOBACCO SCREEN RCVD TLK: CPT | Performed by: HOSPITALIST

## 2025-04-16 PROCEDURE — G8427 DOCREV CUR MEDS BY ELIG CLIN: HCPCS | Performed by: HOSPITALIST

## 2025-04-16 PROCEDURE — 3078F DIAST BP <80 MM HG: CPT | Performed by: HOSPITALIST

## 2025-04-16 PROCEDURE — 99213 OFFICE O/P EST LOW 20 MIN: CPT | Performed by: HOSPITALIST

## 2025-04-16 PROCEDURE — G8419 CALC BMI OUT NRM PARAM NOF/U: HCPCS | Performed by: HOSPITALIST

## 2025-04-16 RX ORDER — OMEPRAZOLE 20 MG/1
CAPSULE, DELAYED RELEASE ORAL
Qty: 90 CAPSULE | Refills: 1 | Status: SHIPPED | OUTPATIENT
Start: 2025-04-16

## 2025-04-16 ASSESSMENT — ENCOUNTER SYMPTOMS
ALLERGIC/IMMUNOLOGIC NEGATIVE: 1
GASTROINTESTINAL NEGATIVE: 1
RESPIRATORY NEGATIVE: 1
EYES NEGATIVE: 1
BACK PAIN: 1

## 2025-04-16 NOTE — PROGRESS NOTES
[facility]      Date of Patient's Visit: 4/16/2025    Progress note    Patient Care Team:  Merrick Barton MD as PCP - General (Internal Medicine)  Merrick Barton MD as PCP - [facility] Provider  Goldberger, Edward, MD as Consulting Physician (Rheumatology)  Bakrai Coleman MD as Anesthesiologist (Pain Management)  Nikunj Pagan MD (Orthopedic Surgery)  Gurpreet Tineo MD as Surgeon (Orthopedic Surgery)  Rebel Man MD as Anesthesiologist (Pain Management)      CHIEF COMPLAINT  Chief Complaint   Patient presents with    Established New Doctor     Arnel Neumann       Hypertension     Follow up  Does not check at home very often but when checked  It is 130/80 range       SUBJECTIVE  Felicita Ashraf is a 64 y.o. female who presents for routine follow-up.  Patient was seen here last month.  She was seen for significant pain in her hands and other issues.  She was given blood work and x-rays.  She has not gotten any of these done.  Patient states that she has been having issues with significant osteoarthritis.  She has not had any intervention done as she keeps losing her physicians to jail.  She states that she is depressed because situation.  She denies any suicidal or homicidal ideation.  Patient has not gotten her mammogram done.  She had a colonoscopy done in 2023 and is supposed to get one in 2028.  She denies smoking, alcohol or drug abuse.  She states she takes her medications as prescribed.  She is not interested in taking any vaccination.      ROS  All other review of systems negative, except for those noted.    Review of Systems   Constitutional: Negative.    HENT: Negative.     Eyes: Negative.    Respiratory: Negative.     Cardiovascular: Negative.    Gastrointestinal: Negative.    Endocrine: Negative.    Genitourinary: Negative.    Musculoskeletal:  Positive for arthralgias, back pain, joint swelling and myalgias.

## 2025-04-30 ENCOUNTER — HOSPITAL ENCOUNTER (OUTPATIENT)
Dept: GENERAL RADIOLOGY | Age: 65
Discharge: HOME | End: 2025-05-02
Payer: MEDICARE

## 2025-04-30 ENCOUNTER — HOSPITAL ENCOUNTER (OUTPATIENT)
Age: 65
Discharge: HOME | End: 2025-04-30
Payer: MEDICARE

## 2025-04-30 DIAGNOSIS — M25.531 CHRONIC PAIN OF BOTH WRISTS: ICD-10-CM

## 2025-04-30 DIAGNOSIS — M79.672 CHRONIC FOOT PAIN, LEFT: ICD-10-CM

## 2025-04-30 DIAGNOSIS — G89.29 CHRONIC FOOT PAIN, LEFT: ICD-10-CM

## 2025-04-30 DIAGNOSIS — Z11.4 ENCOUNTER FOR SCREENING FOR HIV: ICD-10-CM

## 2025-04-30 DIAGNOSIS — G89.29 CHRONIC PAIN OF BOTH WRISTS: ICD-10-CM

## 2025-04-30 DIAGNOSIS — Z11.59 ENCOUNTER FOR HEPATITIS C SCREENING TEST FOR LOW RISK PATIENT: ICD-10-CM

## 2025-04-30 DIAGNOSIS — M25.532 CHRONIC PAIN OF BOTH WRISTS: ICD-10-CM

## 2025-04-30 DIAGNOSIS — R73.02 IGT (IMPAIRED GLUCOSE TOLERANCE): ICD-10-CM

## 2025-04-30 LAB
CRP SERPL HS-MCNC: <3 MG/L (ref 0–5)
EST. AVERAGE GLUCOSE BLD GHB EST-MCNC: 120 MG/DL
HBA1C MFR BLD: 5.8 % (ref 4–6)
HCV AB SERPL QL IA: NONREACTIVE
HIV 1+2 AB+HIV1 P24 AG SERPL QL IA: NONREACTIVE

## 2025-04-30 PROCEDURE — 73130 X-RAY EXAM OF HAND: CPT

## 2025-04-30 PROCEDURE — 86431 RHEUMATOID FACTOR QUANT: CPT

## 2025-04-30 PROCEDURE — 36415 COLL VENOUS BLD VENIPUNCTURE: CPT

## 2025-04-30 PROCEDURE — 87389 HIV-1 AG W/HIV-1&-2 AB AG IA: CPT

## 2025-04-30 PROCEDURE — 86038 ANTINUCLEAR ANTIBODIES: CPT

## 2025-04-30 PROCEDURE — 73110 X-RAY EXAM OF WRIST: CPT

## 2025-04-30 PROCEDURE — 83036 HEMOGLOBIN GLYCOSYLATED A1C: CPT

## 2025-04-30 PROCEDURE — 86803 HEPATITIS C AB TEST: CPT

## 2025-04-30 PROCEDURE — 85652 RBC SED RATE AUTOMATED: CPT

## 2025-04-30 PROCEDURE — 73620 X-RAY EXAM OF FOOT: CPT

## 2025-04-30 PROCEDURE — 86140 C-REACTIVE PROTEIN: CPT

## 2025-04-30 PROCEDURE — 84550 ASSAY OF BLOOD/URIC ACID: CPT

## 2025-05-01 LAB
ANA SER QL IA: POSITIVE
DSDNA IGG SER QL IA: 1.2 IU/ML
ERYTHROCYTE [SEDIMENTATION RATE] IN BLOOD BY PHOTOMETRIC METHOD: 14 MM/HR (ref 0–30)
NUCLEAR IGG SER IA-RTO: 1.4 U/ML
RHEUMATOID FACT SER NEPH-ACNC: 63 IU/ML (ref 0–13)
URATE SERPL-MCNC: 5.8 MG/DL (ref 2.4–5.7)

## 2025-05-08 ENCOUNTER — OFFICE VISIT (OUTPATIENT)
Age: 65
End: 2025-05-08
Payer: MEDICARE

## 2025-05-08 VITALS
DIASTOLIC BLOOD PRESSURE: 82 MMHG | WEIGHT: 175.2 LBS | SYSTOLIC BLOOD PRESSURE: 133 MMHG | OXYGEN SATURATION: 96 % | HEIGHT: 65 IN | HEART RATE: 81 BPM | BODY MASS INDEX: 29.19 KG/M2

## 2025-05-08 DIAGNOSIS — R76.8 POSITIVE ANA (ANTINUCLEAR ANTIBODY): ICD-10-CM

## 2025-05-08 DIAGNOSIS — Z78.0 POST-MENOPAUSAL: ICD-10-CM

## 2025-05-08 DIAGNOSIS — E55.9 VITAMIN D DEFICIENCY: ICD-10-CM

## 2025-05-08 DIAGNOSIS — R76.8 RHEUMATOID FACTOR POSITIVE: ICD-10-CM

## 2025-05-08 DIAGNOSIS — M25.50 ARTHRALGIA, UNSPECIFIED JOINT: Primary | ICD-10-CM

## 2025-05-08 PROCEDURE — G8427 DOCREV CUR MEDS BY ELIG CLIN: HCPCS | Performed by: STUDENT IN AN ORGANIZED HEALTH CARE EDUCATION/TRAINING PROGRAM

## 2025-05-08 PROCEDURE — 3075F SYST BP GE 130 - 139MM HG: CPT | Performed by: STUDENT IN AN ORGANIZED HEALTH CARE EDUCATION/TRAINING PROGRAM

## 2025-05-08 PROCEDURE — 99204 OFFICE O/P NEW MOD 45 MIN: CPT | Performed by: STUDENT IN AN ORGANIZED HEALTH CARE EDUCATION/TRAINING PROGRAM

## 2025-05-08 PROCEDURE — G8419 CALC BMI OUT NRM PARAM NOF/U: HCPCS | Performed by: STUDENT IN AN ORGANIZED HEALTH CARE EDUCATION/TRAINING PROGRAM

## 2025-05-08 PROCEDURE — 3079F DIAST BP 80-89 MM HG: CPT | Performed by: STUDENT IN AN ORGANIZED HEALTH CARE EDUCATION/TRAINING PROGRAM

## 2025-05-08 PROCEDURE — 4004F PT TOBACCO SCREEN RCVD TLK: CPT | Performed by: STUDENT IN AN ORGANIZED HEALTH CARE EDUCATION/TRAINING PROGRAM

## 2025-05-08 PROCEDURE — G2211 COMPLEX E/M VISIT ADD ON: HCPCS | Performed by: STUDENT IN AN ORGANIZED HEALTH CARE EDUCATION/TRAINING PROGRAM

## 2025-05-08 PROCEDURE — 3017F COLORECTAL CA SCREEN DOC REV: CPT | Performed by: STUDENT IN AN ORGANIZED HEALTH CARE EDUCATION/TRAINING PROGRAM

## 2025-05-08 RX ORDER — PREDNISONE 20 MG/1
20 TABLET ORAL DAILY PRN
COMMUNITY

## 2025-05-08 ASSESSMENT — ENCOUNTER SYMPTOMS: BACK PAIN: 1

## 2025-05-08 NOTE — PROGRESS NOTES
Reason for Referral: Chronic pain of both wrists    Merrick Barton MD  [address] [place] [address]    Chief Complaint   Patient presents with    Establish Care    Wrist Pain     JAN       HISTORY OF PRESENT ILLNESS: Ms.Elsie MAIA Ashraf is a 64 y.o. female with a medical history remarkable for essential hypertension, osteoarthritis, degenerative disc disease referred for evaluation of chronic pain of both wrists.     Patient was evaluated by PCP due to concerns of significant pain in hands.  Patient reports that she has not had diffuse arthralgia for a long time now.  Recently she has noticed that her hand pain has been getting worse and she has been dropping things.  At times her knees seem to give out on her.  She experiences morning stiffness and swelling in her hands, knees for all at least an hour.  She reports that she feels like a ' human barometer' as she cannot predict weather changes based on her joint pain.  The symptoms are associated with fatigue, dry eyes and history of 1 miscarriage.    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  She recalls seeing neurologist and there were some suspicion for pinched nerve and she used to receive injections.  She also recalls being evaluated by Dr. Goldberger a long time ago and was diagnosed with fibromyalgia, pinched nerve, osteoarthritis.  Later on she mentions that she was also told that her body seems to be fighting her own body for unknown reasons and she was given the option to either take medications or not and she opted to stay off of them.    She denies hx of photosensitivity, dry mouth, recurrent oral/nasal ulcer, complains of Raynaud's, DVT/PE,  pleurisy, low energy, patchy hair loss    1997: Hysterectomy    Previous Rheumatology workup   4/30/2025  DREW screen: Positive  DREW antibodies: Positive  Anti-dsDNA: Negative  RF: 63 (cutoff: 13)  ESR, CRP: Normal  Uric acid: 5.8 (cutoff:

## 2025-05-08 NOTE — PROGRESS NOTES
Review of Systems   Musculoskeletal:  Positive for arthralgias, back pain, gait problem, joint swelling, myalgias, neck pain and neck stiffness.   Psychiatric/Behavioral:  Positive for sleep disturbance.    All other systems reviewed and are negative.

## 2025-05-27 ENCOUNTER — HOSPITAL ENCOUNTER (OUTPATIENT)
Dept: MAMMOGRAPHY | Age: 65
Discharge: HOME | End: 2025-05-29
Attending: STUDENT IN AN ORGANIZED HEALTH CARE EDUCATION/TRAINING PROGRAM
Payer: MEDICARE

## 2025-05-27 DIAGNOSIS — Z78.0 POST-MENOPAUSAL: ICD-10-CM

## 2025-05-27 PROCEDURE — 77080 DXA BONE DENSITY AXIAL: CPT

## 2025-05-27 PROCEDURE — 77081 DXA BONE DENSITY APPENDICULR: CPT

## 2025-06-16 ENCOUNTER — HOSPITAL ENCOUNTER (OUTPATIENT)
Age: 65
Discharge: HOME | End: 2025-06-16
Payer: MEDICARE

## 2025-06-16 DIAGNOSIS — R76.8 POSITIVE ANA (ANTINUCLEAR ANTIBODY): ICD-10-CM

## 2025-06-16 DIAGNOSIS — M25.50 ARTHRALGIA, UNSPECIFIED JOINT: ICD-10-CM

## 2025-06-16 DIAGNOSIS — E55.9 VITAMIN D DEFICIENCY: ICD-10-CM

## 2025-06-16 DIAGNOSIS — R76.8 RHEUMATOID FACTOR POSITIVE: ICD-10-CM

## 2025-06-16 LAB
25(OH)D3 SERPL-MCNC: 47.4 NG/ML (ref 30–100)
ALBUMIN SERPL-MCNC: 4 G/DL (ref 3.5–5.2)
ALBUMIN/GLOB SERPL: 1.2 {RATIO} (ref 1–2.5)
ALP SERPL-CCNC: 36 U/L (ref 35–104)
ALT SERPL-CCNC: 14 U/L (ref 10–35)
ANION GAP SERPL CALCULATED.3IONS-SCNC: 12 MMOL/L (ref 9–16)
AST SERPL-CCNC: 20 U/L (ref 10–35)
BACTERIA URNS QL MICRO: ABNORMAL
BASOPHILS # BLD: 0.03 K/UL (ref 0–0.2)
BASOPHILS NFR BLD: 0 % (ref 0–2)
BILIRUB SERPL-MCNC: 0.3 MG/DL (ref 0–1.2)
BILIRUB UR QL STRIP: NEGATIVE
BUN SERPL-MCNC: 9 MG/DL (ref 8–23)
CALCIUM SERPL-MCNC: 9.6 MG/DL (ref 8.6–10.4)
CHLORIDE SERPL-SCNC: 103 MMOL/L (ref 98–107)
CLARITY UR: CLEAR
CO2 SERPL-SCNC: 24 MMOL/L (ref 20–31)
COLOR UR: YELLOW
CREAT SERPL-MCNC: 0.7 MG/DL (ref 0.6–0.9)
EOSINOPHIL # BLD: 0.15 K/UL (ref 0–0.44)
EOSINOPHILS RELATIVE PERCENT: 2 % (ref 1–4)
EPI CELLS #/AREA URNS HPF: ABNORMAL /HPF (ref 0–5)
ERYTHROCYTE [DISTWIDTH] IN BLOOD BY AUTOMATED COUNT: 14.8 % (ref 11.8–14.4)
GFR, ESTIMATED: >90 ML/MIN/1.73M2
GLUCOSE SERPL-MCNC: 100 MG/DL (ref 74–99)
GLUCOSE UR STRIP-MCNC: NEGATIVE MG/DL
HCT VFR BLD AUTO: 37.7 % (ref 36.3–47.1)
HGB BLD-MCNC: 12.1 G/DL (ref 11.9–15.1)
HGB UR QL STRIP.AUTO: NEGATIVE
IMM GRANULOCYTES # BLD AUTO: <0.03 K/UL (ref 0–0.3)
IMM GRANULOCYTES NFR BLD: 0 %
KETONES UR STRIP-MCNC: NEGATIVE MG/DL
LEUKOCYTE ESTERASE UR QL STRIP: ABNORMAL
LYMPHOCYTES NFR BLD: 2.4 K/UL (ref 1.1–3.7)
LYMPHOCYTES RELATIVE PERCENT: 34 % (ref 24–43)
MCH RBC QN AUTO: 27.6 PG (ref 25.2–33.5)
MCHC RBC AUTO-ENTMCNC: 32.1 G/DL (ref 28.4–34.8)
MCV RBC AUTO: 85.9 FL (ref 82.6–102.9)
MONOCYTES NFR BLD: 0.58 K/UL (ref 0.1–1.2)
MONOCYTES NFR BLD: 8 % (ref 3–12)
NEUTROPHILS NFR BLD: 56 % (ref 36–65)
NEUTS SEG NFR BLD: 3.84 K/UL (ref 1.5–8.1)
NITRITE UR QL STRIP: NEGATIVE
NRBC BLD-RTO: 0 PER 100 WBC
PH UR STRIP: 6.5 [PH] (ref 5–8)
PLATELET # BLD AUTO: 252 K/UL (ref 138–453)
PMV BLD AUTO: 10.5 FL (ref 8.1–13.5)
POTASSIUM SERPL-SCNC: 3.7 MMOL/L (ref 3.7–5.3)
PROT SERPL-MCNC: 7.3 G/DL (ref 6.6–8.7)
PROT UR STRIP-MCNC: ABNORMAL MG/DL
RBC # BLD AUTO: 4.39 M/UL (ref 3.95–5.11)
RBC # BLD: ABNORMAL 10*6/UL
RBC #/AREA URNS HPF: ABNORMAL /HPF (ref 0–4)
SODIUM SERPL-SCNC: 139 MMOL/L (ref 136–145)
SP GR UR STRIP: 1.02 (ref 1–1.03)
UROBILINOGEN UR STRIP-ACNC: NORMAL EU/DL (ref 0–1)
WBC #/AREA URNS HPF: ABNORMAL /HPF (ref 0–5)
WBC OTHER # BLD: 7 K/UL (ref 3.5–11.3)

## 2025-06-16 PROCEDURE — 85025 COMPLETE CBC W/AUTO DIFF WBC: CPT

## 2025-06-16 PROCEDURE — 80053 COMPREHEN METABOLIC PANEL: CPT

## 2025-06-16 PROCEDURE — 36415 COLL VENOUS BLD VENIPUNCTURE: CPT

## 2025-06-16 PROCEDURE — 83516 IMMUNOASSAY NONANTIBODY: CPT

## 2025-06-16 PROCEDURE — 81001 URINALYSIS AUTO W/SCOPE: CPT

## 2025-06-16 PROCEDURE — 82306 VITAMIN D 25 HYDROXY: CPT

## 2025-06-16 PROCEDURE — 86235 NUCLEAR ANTIGEN ANTIBODY: CPT

## 2025-06-16 PROCEDURE — 86200 CCP ANTIBODY: CPT

## 2025-06-16 PROCEDURE — 86039 ANTINUCLEAR ANTIBODIES (ANA): CPT

## 2025-06-17 LAB
CCP AB SER IA-ACNC: 1 U/ML (ref 0–7)
ENA SCL70 AB SER IA-ACNC: 0.7 U/ML
ENA SM AB SER-ACNC: <0.8 U/ML
ENA SS-A 60KD AB SER-ACNC: <0.5 U/ML
ENA SS-A IGG SER QL: 18 U/ML
ENA SS-A IGG SER QL: 19 U/ML
ENA SS-B IGG SER IA-ACNC: <0.3 U/ML
RIBOSOMAL P AB SER-ACNC: <1.9 U/ML
RNAP III IGG SERPL IA-ACNC: <0.7 U/ML
U1 SNRNP IGG SER IA-ACNC: 1.3 U/ML

## 2025-06-19 ENCOUNTER — OFFICE VISIT (OUTPATIENT)
Age: 65
End: 2025-06-19
Payer: MEDICARE

## 2025-06-19 VITALS
TEMPERATURE: 98 F | BODY MASS INDEX: 28.28 KG/M2 | DIASTOLIC BLOOD PRESSURE: 76 MMHG | OXYGEN SATURATION: 98 % | SYSTOLIC BLOOD PRESSURE: 140 MMHG | HEART RATE: 84 BPM | HEIGHT: 66 IN

## 2025-06-19 DIAGNOSIS — R76.8 RHEUMATOID FACTOR POSITIVE: ICD-10-CM

## 2025-06-19 DIAGNOSIS — M19.90 INFLAMMATORY ARTHRITIS: ICD-10-CM

## 2025-06-19 DIAGNOSIS — M25.50 ARTHRALGIA, UNSPECIFIED JOINT: Primary | ICD-10-CM

## 2025-06-19 PROCEDURE — G2211 COMPLEX E/M VISIT ADD ON: HCPCS | Performed by: STUDENT IN AN ORGANIZED HEALTH CARE EDUCATION/TRAINING PROGRAM

## 2025-06-19 PROCEDURE — 3017F COLORECTAL CA SCREEN DOC REV: CPT | Performed by: STUDENT IN AN ORGANIZED HEALTH CARE EDUCATION/TRAINING PROGRAM

## 2025-06-19 PROCEDURE — 99214 OFFICE O/P EST MOD 30 MIN: CPT | Performed by: STUDENT IN AN ORGANIZED HEALTH CARE EDUCATION/TRAINING PROGRAM

## 2025-06-19 PROCEDURE — 3078F DIAST BP <80 MM HG: CPT | Performed by: STUDENT IN AN ORGANIZED HEALTH CARE EDUCATION/TRAINING PROGRAM

## 2025-06-19 PROCEDURE — 3077F SYST BP >= 140 MM HG: CPT | Performed by: STUDENT IN AN ORGANIZED HEALTH CARE EDUCATION/TRAINING PROGRAM

## 2025-06-19 PROCEDURE — G8419 CALC BMI OUT NRM PARAM NOF/U: HCPCS | Performed by: STUDENT IN AN ORGANIZED HEALTH CARE EDUCATION/TRAINING PROGRAM

## 2025-06-19 PROCEDURE — G8427 DOCREV CUR MEDS BY ELIG CLIN: HCPCS | Performed by: STUDENT IN AN ORGANIZED HEALTH CARE EDUCATION/TRAINING PROGRAM

## 2025-06-19 PROCEDURE — 4004F PT TOBACCO SCREEN RCVD TLK: CPT | Performed by: STUDENT IN AN ORGANIZED HEALTH CARE EDUCATION/TRAINING PROGRAM

## 2025-06-19 ASSESSMENT — ENCOUNTER SYMPTOMS: BACK PAIN: 1

## 2025-06-19 NOTE — PROGRESS NOTES
Reason for Referral: Chronic pain of both wrists    No referring provider defined for this encounter.    Chief Complaint   Patient presents with    Follow-up    Pain     Extreme Leg pain    handicap      Pt would like to discuss getting one       HISTORY OF PRESENT ILLNESS: Ms.Elsie MAIA Ashraf is a 64 y.o. female with a medical history remarkable for essential hypertension, osteoarthritis, degenerative disc disease referred for evaluation of chronic pain of both wrists.     Patient was evaluated by PCP due to concerns of significant pain in hands.  Patient reports that she has not had diffuse arthralgia for a long time now.  Recently she has noticed that her hand pain has been getting worse and she has been dropping things.  At times her knees seem to give out on her.  She experiences morning stiffness and swelling in her hands, knees for all at least an hour.  She reports that she feels like a ' human barometer' as she cannot predict weather changes based on her joint pain.  The symptoms are associated with fatigue, dry eyes and history of 1 miscarriage.    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  She recalls seeing neurologist and there were some suspicion for pinched nerve and she used to receive injections.  She also recalls being evaluated by Dr. Goldberger a long time ago and was diagnosed with fibromyalgia, pinched nerve, osteoarthritis.  Later on she mentions that she was also told that her body seems to be fighting her own body for unknown reasons and she was given the option to either take medications or not and she opted to stay off of them.    She denies hx of photosensitivity, dry mouth, recurrent oral/nasal ulcer, complains of Raynaud's, DVT/PE,  pleurisy, low energy, patchy hair loss    1997: Hysterectomy    Interval history:  Patient presenting for follow-up today.  She continues to report diffuse pain throughout her body but is more concerned about

## 2025-06-19 NOTE — PROGRESS NOTES
Review of Systems   Constitutional:  Positive for activity change and appetite change.   Musculoskeletal:  Positive for arthralgias, back pain, gait problem, joint swelling, myalgias, neck pain and neck stiffness.   Psychiatric/Behavioral:  Positive for sleep disturbance.    All other systems reviewed and are negative.

## 2025-06-26 ENCOUNTER — TELEPHONE (OUTPATIENT)
Age: 65
End: 2025-06-26

## 2025-06-26 NOTE — TELEPHONE ENCOUNTER
PC to pt to schedule AWV and f/u appt w/PCP, pt went on long rant about how she doesn't want to get shots and insurance company keeps telling her she needs them. Pt states she had a reaction to a shot a long time ago and refuses to get any vaccinations. Pt also adamantly refuses to complete a mammogram, states she doesn't need it every year and refuses to do it now despite last one being in 2021. Writer updated care gaps as declined.    Pt scheduled for AWV/f/u appt PCP

## 2025-08-05 ENCOUNTER — OFFICE VISIT (OUTPATIENT)
Age: 65
End: 2025-08-05
Payer: MEDICARE

## 2025-08-05 VITALS
BODY MASS INDEX: 27.35 KG/M2 | DIASTOLIC BLOOD PRESSURE: 70 MMHG | HEIGHT: 66 IN | SYSTOLIC BLOOD PRESSURE: 118 MMHG | OXYGEN SATURATION: 99 % | HEART RATE: 77 BPM | WEIGHT: 170.2 LBS

## 2025-08-05 DIAGNOSIS — M25.531 CHRONIC PAIN OF BOTH WRISTS: ICD-10-CM

## 2025-08-05 DIAGNOSIS — G89.29 CHRONIC PAIN OF BOTH WRISTS: ICD-10-CM

## 2025-08-05 DIAGNOSIS — Z00.00 MEDICARE ANNUAL WELLNESS VISIT, SUBSEQUENT: Primary | ICD-10-CM

## 2025-08-05 DIAGNOSIS — Z11.4 ENCOUNTER FOR SCREENING FOR HIV: ICD-10-CM

## 2025-08-05 DIAGNOSIS — K21.9 GASTROESOPHAGEAL REFLUX DISEASE WITHOUT ESOPHAGITIS: ICD-10-CM

## 2025-08-05 DIAGNOSIS — E79.0 HYPERURICEMIA: ICD-10-CM

## 2025-08-05 DIAGNOSIS — I10 ESSENTIAL HYPERTENSION: ICD-10-CM

## 2025-08-05 DIAGNOSIS — M25.532 CHRONIC PAIN OF BOTH WRISTS: ICD-10-CM

## 2025-08-05 DIAGNOSIS — I50.32 CHRONIC DIASTOLIC CONGESTIVE HEART FAILURE (HCC): ICD-10-CM

## 2025-08-05 DIAGNOSIS — Z12.31 BREAST CANCER SCREENING BY MAMMOGRAM: ICD-10-CM

## 2025-08-05 DIAGNOSIS — Z12.11 SCREEN FOR COLON CANCER: ICD-10-CM

## 2025-08-05 DIAGNOSIS — F33.1 MODERATE EPISODE OF RECURRENT MAJOR DEPRESSIVE DISORDER (HCC): ICD-10-CM

## 2025-08-05 DIAGNOSIS — E78.00 PURE HYPERCHOLESTEROLEMIA: ICD-10-CM

## 2025-08-05 DIAGNOSIS — Z11.59 ENCOUNTER FOR HEPATITIS C SCREENING TEST FOR LOW RISK PATIENT: ICD-10-CM

## 2025-08-05 PROCEDURE — 3074F SYST BP LT 130 MM HG: CPT | Performed by: HOSPITALIST

## 2025-08-05 PROCEDURE — 4004F PT TOBACCO SCREEN RCVD TLK: CPT | Performed by: HOSPITALIST

## 2025-08-05 PROCEDURE — 1123F ACP DISCUSS/DSCN MKR DOCD: CPT | Performed by: HOSPITALIST

## 2025-08-05 PROCEDURE — 1090F PRES/ABSN URINE INCON ASSESS: CPT | Performed by: HOSPITALIST

## 2025-08-05 PROCEDURE — 3017F COLORECTAL CA SCREEN DOC REV: CPT | Performed by: HOSPITALIST

## 2025-08-05 PROCEDURE — G8427 DOCREV CUR MEDS BY ELIG CLIN: HCPCS | Performed by: HOSPITALIST

## 2025-08-05 PROCEDURE — G8419 CALC BMI OUT NRM PARAM NOF/U: HCPCS | Performed by: HOSPITALIST

## 2025-08-05 PROCEDURE — G8399 PT W/DXA RESULTS DOCUMENT: HCPCS | Performed by: HOSPITALIST

## 2025-08-05 PROCEDURE — 99214 OFFICE O/P EST MOD 30 MIN: CPT | Performed by: HOSPITALIST

## 2025-08-05 PROCEDURE — 3078F DIAST BP <80 MM HG: CPT | Performed by: HOSPITALIST

## 2025-08-05 PROCEDURE — G0439 PPPS, SUBSEQ VISIT: HCPCS | Performed by: HOSPITALIST

## 2025-08-05 RX ORDER — METHYLPREDNISOLONE 4 MG/1
TABLET ORAL
Qty: 1 KIT | Refills: 0 | Status: SHIPPED | OUTPATIENT
Start: 2025-08-05 | End: 2025-08-11

## 2025-08-05 RX ORDER — AMLODIPINE BESYLATE 10 MG/1
10 TABLET ORAL DAILY
Qty: 90 TABLET | Refills: 1 | Status: SHIPPED | OUTPATIENT
Start: 2025-08-05

## 2025-08-05 RX ORDER — COLCHICINE 0.6 MG/1
0.6 TABLET ORAL DAILY
Qty: 90 TABLET | Refills: 1 | Status: SHIPPED | OUTPATIENT
Start: 2025-08-05

## 2025-08-05 RX ORDER — M-VIT,TX,IRON,MINS/CALC/FOLIC 27MG-0.4MG
1 TABLET ORAL DAILY
Qty: 90 TABLET | Refills: 1 | Status: SHIPPED | OUTPATIENT
Start: 2025-08-05

## 2025-08-05 ASSESSMENT — LIFESTYLE VARIABLES
HOW OFTEN DO YOU HAVE A DRINK CONTAINING ALCOHOL: NEVER
HOW MANY STANDARD DRINKS CONTAINING ALCOHOL DO YOU HAVE ON A TYPICAL DAY: PATIENT DOES NOT DRINK

## 2025-08-05 ASSESSMENT — PATIENT HEALTH QUESTIONNAIRE - PHQ9
8. MOVING OR SPEAKING SO SLOWLY THAT OTHER PEOPLE COULD HAVE NOTICED. OR THE OPPOSITE, BEING SO FIGETY OR RESTLESS THAT YOU HAVE BEEN MOVING AROUND A LOT MORE THAN USUAL: NOT AT ALL
4. FEELING TIRED OR HAVING LITTLE ENERGY: NOT AT ALL
6. FEELING BAD ABOUT YOURSELF - OR THAT YOU ARE A FAILURE OR HAVE LET YOURSELF OR YOUR FAMILY DOWN: NOT AT ALL
10. IF YOU CHECKED OFF ANY PROBLEMS, HOW DIFFICULT HAVE THESE PROBLEMS MADE IT FOR YOU TO DO YOUR WORK, TAKE CARE OF THINGS AT HOME, OR GET ALONG WITH OTHER PEOPLE: NOT DIFFICULT AT ALL
1. LITTLE INTEREST OR PLEASURE IN DOING THINGS: NOT AT ALL
SUM OF ALL RESPONSES TO PHQ QUESTIONS 1-9: 3
SUM OF ALL RESPONSES TO PHQ QUESTIONS 1-9: 3
3. TROUBLE FALLING OR STAYING ASLEEP: NEARLY EVERY DAY
7. TROUBLE CONCENTRATING ON THINGS, SUCH AS READING THE NEWSPAPER OR WATCHING TELEVISION: NOT AT ALL
SUM OF ALL RESPONSES TO PHQ QUESTIONS 1-9: 3
5. POOR APPETITE OR OVEREATING: NOT AT ALL
SUM OF ALL RESPONSES TO PHQ QUESTIONS 1-9: 3
9. THOUGHTS THAT YOU WOULD BE BETTER OFF DEAD, OR OF HURTING YOURSELF: NOT AT ALL
2. FEELING DOWN, DEPRESSED OR HOPELESS: NOT AT ALL

## 2025-08-05 ASSESSMENT — ENCOUNTER SYMPTOMS
ALLERGIC/IMMUNOLOGIC NEGATIVE: 1
EYES NEGATIVE: 1
GASTROINTESTINAL NEGATIVE: 1
RESPIRATORY NEGATIVE: 1
BACK PAIN: 1

## 2025-08-15 ENCOUNTER — OFFICE VISIT (OUTPATIENT)
Age: 65
End: 2025-08-15
Payer: MEDICARE

## 2025-08-15 VITALS
BODY MASS INDEX: 27.44 KG/M2 | SYSTOLIC BLOOD PRESSURE: 116 MMHG | HEART RATE: 79 BPM | WEIGHT: 170 LBS | DIASTOLIC BLOOD PRESSURE: 72 MMHG | OXYGEN SATURATION: 97 %

## 2025-08-15 DIAGNOSIS — M19.90 INFLAMMATORY ARTHRITIS: Primary | ICD-10-CM

## 2025-08-15 DIAGNOSIS — M19.90 OSTEOARTHRITIS, UNSPECIFIED OSTEOARTHRITIS TYPE, UNSPECIFIED SITE: ICD-10-CM

## 2025-08-15 DIAGNOSIS — R76.8 POSITIVE ANA (ANTINUCLEAR ANTIBODY): ICD-10-CM

## 2025-08-15 DIAGNOSIS — M85.80 OSTEOPENIA, UNSPECIFIED LOCATION: ICD-10-CM

## 2025-08-15 PROCEDURE — G8419 CALC BMI OUT NRM PARAM NOF/U: HCPCS | Performed by: STUDENT IN AN ORGANIZED HEALTH CARE EDUCATION/TRAINING PROGRAM

## 2025-08-15 PROCEDURE — 1090F PRES/ABSN URINE INCON ASSESS: CPT | Performed by: STUDENT IN AN ORGANIZED HEALTH CARE EDUCATION/TRAINING PROGRAM

## 2025-08-15 PROCEDURE — 3078F DIAST BP <80 MM HG: CPT | Performed by: STUDENT IN AN ORGANIZED HEALTH CARE EDUCATION/TRAINING PROGRAM

## 2025-08-15 PROCEDURE — 99214 OFFICE O/P EST MOD 30 MIN: CPT | Performed by: STUDENT IN AN ORGANIZED HEALTH CARE EDUCATION/TRAINING PROGRAM

## 2025-08-15 PROCEDURE — 4004F PT TOBACCO SCREEN RCVD TLK: CPT | Performed by: STUDENT IN AN ORGANIZED HEALTH CARE EDUCATION/TRAINING PROGRAM

## 2025-08-15 PROCEDURE — G8428 CUR MEDS NOT DOCUMENT: HCPCS | Performed by: STUDENT IN AN ORGANIZED HEALTH CARE EDUCATION/TRAINING PROGRAM

## 2025-08-15 PROCEDURE — 3074F SYST BP LT 130 MM HG: CPT | Performed by: STUDENT IN AN ORGANIZED HEALTH CARE EDUCATION/TRAINING PROGRAM

## 2025-08-15 PROCEDURE — 1123F ACP DISCUSS/DSCN MKR DOCD: CPT | Performed by: STUDENT IN AN ORGANIZED HEALTH CARE EDUCATION/TRAINING PROGRAM

## 2025-08-15 PROCEDURE — 3017F COLORECTAL CA SCREEN DOC REV: CPT | Performed by: STUDENT IN AN ORGANIZED HEALTH CARE EDUCATION/TRAINING PROGRAM

## 2025-08-15 PROCEDURE — G8399 PT W/DXA RESULTS DOCUMENT: HCPCS | Performed by: STUDENT IN AN ORGANIZED HEALTH CARE EDUCATION/TRAINING PROGRAM

## 2025-08-15 ASSESSMENT — ENCOUNTER SYMPTOMS: BACK PAIN: 1

## (undated) DEVICE — 450 ML BOTTLE OF 0.05% CHLORHEXIDINE GLUCONATE IN 99.95% STERILE WATER FOR IRRIGATION, USP AND APPLICATOR.: Brand: IRRISEPT ANTIMICROBIAL WOUND LAVAGE

## (undated) DEVICE — SOLUTION IV 250ML 0.9% SOD CHL PH 5 INJ USP VIAFLX PLAS

## (undated) DEVICE — PREP-RESISTANT MARKER W/ RULER: Brand: MEDLINE INDUSTRIES, INC.

## (undated) DEVICE — 3M™ IOBAN™ 2 ANTIMICROBIAL INCISE DRAPE 6650EZ: Brand: IOBAN™ 2

## (undated) DEVICE — GAUZE,SPONGE,4"X4",16PLY,XRAY,STRL,LF: Brand: MEDLINE

## (undated) DEVICE — SUTURE STRATAFIX SPRL MCRYL + 3 0 SGL ARMED PS 1 24 IN LEN SXMP1B103

## (undated) DEVICE — GLOVE SURG SZ 7 CRM LTX FREE POLYISOPRENE POLYMER BEAD ANTI

## (undated) DEVICE — GLOVE SURG SZ 8 L12IN FNGR THK79MIL GRN LTX FREE

## (undated) DEVICE — SUTURE VCRL SZ 2-0 L27IN ABSRB UD L36MM CP-1 1/2 CIR REV J266H

## (undated) DEVICE — SUTURE VCRL SZ 1 L27IN ABSRB UD L36MM CP-1 1/2 CIR REV CUT J268H

## (undated) DEVICE — ZIPPERED TOGA, X-LARGE: Brand: FLYTE

## (undated) DEVICE — Device

## (undated) DEVICE — 40580 - THE PINK PAD - ADVANCED TRENDELENBURG POSITIONING KIT: Brand: 40580 - THE PINK PAD - ADVANCED TRENDELENBURG POSITIONING KIT

## (undated) DEVICE — DRAPE,REIN 53X77,STERILE: Brand: MEDLINE

## (undated) DEVICE — INTENDED FOR TISSUE SEPARATION, AND OTHER PROCEDURES THAT REQUIRE A SHARP SURGICAL BLADE TO PUNCTURE OR CUT.: Brand: BARD-PARKER ® CARBON RIB-BACK BLADES

## (undated) DEVICE — SUTURE STRATAFIX SYMMETRIC PDS + SZ 1 L18IN ABSRB VLT L48MM SXPP1A400

## (undated) DEVICE — DRAPE,T,LIMB,BILATERAL,STERILE: Brand: MEDLINE

## (undated) DEVICE — GLOVE SURG SZ 65 L12IN FNGR THK126MIL CRM LTX FREE

## (undated) DEVICE — 3M™ STERI-DRAPE™ U-DRAPE 1015: Brand: STERI-DRAPE™

## (undated) DEVICE — PICO 7 10CM X 20CM: Brand: PICO™ 7

## (undated) DEVICE — PAD PROTCT PT 12 IN

## (undated) DEVICE — STOCKINETTE,IMPERVIOUS,12X48,STERILE: Brand: MEDLINE

## (undated) DEVICE — 2108 SERIES SAGITTAL BLADE, NO OFFSET  (18.6 X 1.24 X 105MM)

## (undated) DEVICE — BANDAGE COBAN 6 IN WND 6INX5YD FOAM

## (undated) DEVICE — APPLICATOR MEDICATED 26 CC SOLUTION HI LT ORNG CHLORAPREP

## (undated) DEVICE — WOUND RETRACTOR AND PROTECTOR: Brand: ALEXIS WOUND PROTECTOR-RETRACTOR

## (undated) DEVICE — BLANKET WRM W29.9XL79.1IN UP BODY FORC AIR MISTRAL-AIR

## (undated) DEVICE — GLOVE SURG SZ 8 CRM LTX FREE POLYISOPRENE POLYMER BEAD ANTI

## (undated) DEVICE — SOLUTION IV 1000ML 0.9% SOD CHL PH 5 INJ USP VIAFLX PLAS

## (undated) DEVICE — DRAPE,U/ SHT,SPLIT,PLAS,STERIL: Brand: MEDLINE

## (undated) DEVICE — BIPOLAR SEALER 23-112-1 AQM 6.0: Brand: AQUAMANTYS ®

## (undated) DEVICE — SILVER-COATED ANTIMICROBIAL BARRIER DRESSING: Brand: ACTICOAT FLEX3 4" X 4"